# Patient Record
Sex: FEMALE | Race: OTHER | URBAN - METROPOLITAN AREA
[De-identification: names, ages, dates, MRNs, and addresses within clinical notes are randomized per-mention and may not be internally consistent; named-entity substitution may affect disease eponyms.]

---

## 2022-02-08 ENCOUNTER — INPATIENT (INPATIENT)
Facility: HOSPITAL | Age: 87
LOS: 20 days | Discharge: NURSING, REHAB OR LONG-TERM CARE | End: 2022-03-01
Attending: HOSPITALIST | Admitting: HOSPITALIST
Payer: MEDICARE

## 2022-02-08 VITALS
SYSTOLIC BLOOD PRESSURE: 109 MMHG | DIASTOLIC BLOOD PRESSURE: 55 MMHG | HEART RATE: 96 BPM | RESPIRATION RATE: 8 BRPM | OXYGEN SATURATION: 69 %

## 2022-02-08 PROCEDURE — 99285 EMERGENCY DEPT VISIT HI MDM: CPT

## 2022-02-08 PROCEDURE — 31500 INSERT EMERGENCY AIRWAY: CPT

## 2022-02-08 PROCEDURE — 99291 CRITICAL CARE FIRST HOUR: CPT | Mod: 25

## 2022-02-08 RX ORDER — CHLORHEXIDINE GLUCONATE 213 G/1000ML
15 SOLUTION TOPICAL EVERY 12 HOURS
Refills: 0 | Status: DISCONTINUED | OUTPATIENT
Start: 2022-02-08 | End: 2022-03-01

## 2022-02-08 RX ORDER — SODIUM CHLORIDE 9 MG/ML
2000 INJECTION, SOLUTION INTRAVENOUS ONCE
Refills: 0 | Status: COMPLETED | OUTPATIENT
Start: 2022-02-08 | End: 2022-02-08

## 2022-02-09 LAB
ALBUMIN SERPL ELPH-MCNC: 2.2 G/DL — LOW (ref 3.5–5.2)
ALBUMIN SERPL ELPH-MCNC: 2.3 G/DL — LOW (ref 3.5–5.2)
ALBUMIN SERPL ELPH-MCNC: 2.3 G/DL — LOW (ref 3.5–5.2)
ALBUMIN SERPL ELPH-MCNC: 2.6 G/DL — LOW (ref 3.5–5.2)
ALP SERPL-CCNC: 70 U/L — SIGNIFICANT CHANGE UP (ref 30–115)
ALP SERPL-CCNC: 71 U/L — SIGNIFICANT CHANGE UP (ref 30–115)
ALP SERPL-CCNC: 71 U/L — SIGNIFICANT CHANGE UP (ref 30–115)
ALP SERPL-CCNC: 85 U/L — SIGNIFICANT CHANGE UP (ref 30–115)
ALT FLD-CCNC: 57 U/L — HIGH (ref 0–41)
ALT FLD-CCNC: 57 U/L — HIGH (ref 0–41)
ALT FLD-CCNC: 60 U/L — HIGH (ref 0–41)
ALT FLD-CCNC: 77 U/L — HIGH (ref 0–41)
ANION GAP SERPL CALC-SCNC: 12 MMOL/L — SIGNIFICANT CHANGE UP (ref 7–14)
ANION GAP SERPL CALC-SCNC: 13 MMOL/L — SIGNIFICANT CHANGE UP (ref 7–14)
ANION GAP SERPL CALC-SCNC: 13 MMOL/L — SIGNIFICANT CHANGE UP (ref 7–14)
ANION GAP SERPL CALC-SCNC: 14 MMOL/L — SIGNIFICANT CHANGE UP (ref 7–14)
ANION GAP SERPL CALC-SCNC: >14 MMOL/L — SIGNIFICANT CHANGE UP (ref 7–14)
APPEARANCE UR: ABNORMAL
APTT BLD: 35 SEC — SIGNIFICANT CHANGE UP (ref 27–39.2)
AST SERPL-CCNC: 52 U/L — HIGH (ref 0–41)
AST SERPL-CCNC: 53 U/L — HIGH (ref 0–41)
AST SERPL-CCNC: 62 U/L — HIGH (ref 0–41)
AST SERPL-CCNC: 82 U/L — HIGH (ref 0–41)
BACTERIA # UR AUTO: NEGATIVE — SIGNIFICANT CHANGE UP
BASE EXCESS BLDV CALC-SCNC: -2.8 MMOL/L — LOW (ref -2–3)
BASOPHILS # BLD AUTO: 0.01 K/UL — SIGNIFICANT CHANGE UP (ref 0–0.2)
BASOPHILS # BLD AUTO: 0.02 K/UL — SIGNIFICANT CHANGE UP (ref 0–0.2)
BASOPHILS NFR BLD AUTO: 0.1 % — SIGNIFICANT CHANGE UP (ref 0–1)
BASOPHILS NFR BLD AUTO: 0.2 % — SIGNIFICANT CHANGE UP (ref 0–1)
BILIRUB SERPL-MCNC: 0.5 MG/DL — SIGNIFICANT CHANGE UP (ref 0.2–1.2)
BILIRUB UR-MCNC: NEGATIVE — SIGNIFICANT CHANGE UP
BUN SERPL-MCNC: 103 MG/DL — CRITICAL HIGH (ref 10–20)
BUN SERPL-MCNC: 107 MG/DL — CRITICAL HIGH (ref 10–20)
BUN SERPL-MCNC: 109 MG/DL — CRITICAL HIGH (ref 10–20)
BUN SERPL-MCNC: 110 MG/DL — CRITICAL HIGH (ref 10–20)
BUN SERPL-MCNC: 116 MG/DL — CRITICAL HIGH (ref 10–20)
CA-I SERPL-SCNC: 1.31 MMOL/L — SIGNIFICANT CHANGE UP (ref 1.15–1.33)
CALCIUM SERPL-MCNC: 8.1 MG/DL — LOW (ref 8.5–10.1)
CALCIUM SERPL-MCNC: 8.4 MG/DL — LOW (ref 8.5–10.1)
CALCIUM SERPL-MCNC: 8.7 MG/DL — SIGNIFICANT CHANGE UP (ref 8.5–10.1)
CALCIUM SERPL-MCNC: 8.8 MG/DL — SIGNIFICANT CHANGE UP (ref 8.5–10.1)
CALCIUM SERPL-MCNC: 9.8 MG/DL — SIGNIFICANT CHANGE UP (ref 8.5–10.1)
CHLORIDE SERPL-SCNC: 139 MMOL/L — HIGH (ref 98–110)
CHLORIDE SERPL-SCNC: 142 MMOL/L — HIGH (ref 98–110)
CHLORIDE SERPL-SCNC: 144 MMOL/L — HIGH (ref 98–110)
CHLORIDE SERPL-SCNC: 145 MMOL/L — HIGH (ref 98–110)
CHLORIDE SERPL-SCNC: 145 MMOL/L — HIGH (ref 98–110)
CO2 SERPL-SCNC: 21 MMOL/L — SIGNIFICANT CHANGE UP (ref 17–32)
CO2 SERPL-SCNC: 21 MMOL/L — SIGNIFICANT CHANGE UP (ref 17–32)
CO2 SERPL-SCNC: 22 MMOL/L — SIGNIFICANT CHANGE UP (ref 17–32)
COLOR SPEC: YELLOW — SIGNIFICANT CHANGE UP
CREAT ?TM UR-MCNC: 116 MG/DL — SIGNIFICANT CHANGE UP
CREAT SERPL-MCNC: 2.7 MG/DL — HIGH (ref 0.7–1.5)
CREAT SERPL-MCNC: 2.8 MG/DL — HIGH (ref 0.7–1.5)
CREAT SERPL-MCNC: 2.9 MG/DL — HIGH (ref 0.7–1.5)
CREAT SERPL-MCNC: 2.9 MG/DL — HIGH (ref 0.7–1.5)
CREAT SERPL-MCNC: 3.3 MG/DL — HIGH (ref 0.7–1.5)
DIFF PNL FLD: ABNORMAL
EOSINOPHIL # BLD AUTO: 0 K/UL — SIGNIFICANT CHANGE UP (ref 0–0.7)
EOSINOPHIL # BLD AUTO: 0.01 K/UL — SIGNIFICANT CHANGE UP (ref 0–0.7)
EOSINOPHIL NFR BLD AUTO: 0 % — SIGNIFICANT CHANGE UP (ref 0–8)
EOSINOPHIL NFR BLD AUTO: 0.1 % — SIGNIFICANT CHANGE UP (ref 0–8)
EPI CELLS # UR: 2 /HPF — SIGNIFICANT CHANGE UP (ref 0–5)
GAS PNL BLDA: SIGNIFICANT CHANGE UP
GAS PNL BLDV: 175 MMOL/L — CRITICAL HIGH (ref 136–145)
GAS PNL BLDV: SIGNIFICANT CHANGE UP
GLUCOSE BLDC GLUCOMTR-MCNC: 92 MG/DL — SIGNIFICANT CHANGE UP (ref 70–99)
GLUCOSE SERPL-MCNC: 118 MG/DL — HIGH (ref 70–99)
GLUCOSE SERPL-MCNC: 123 MG/DL — HIGH (ref 70–99)
GLUCOSE SERPL-MCNC: 133 MG/DL — HIGH (ref 70–99)
GLUCOSE SERPL-MCNC: 205 MG/DL — HIGH (ref 70–99)
GLUCOSE SERPL-MCNC: 310 MG/DL — HIGH (ref 70–99)
GLUCOSE UR QL: NEGATIVE — SIGNIFICANT CHANGE UP
HCO3 BLDV-SCNC: 25 MMOL/L — SIGNIFICANT CHANGE UP (ref 22–29)
HCT VFR BLD CALC: 33.7 % — LOW (ref 37–47)
HCT VFR BLD CALC: 35.6 % — LOW (ref 37–47)
HCT VFR BLD CALC: 38.2 % — SIGNIFICANT CHANGE UP (ref 37–47)
HCT VFR BLDA CALC: 38 % — SIGNIFICANT CHANGE UP (ref 34.5–46.5)
HGB BLD CALC-MCNC: 12.7 G/DL — SIGNIFICANT CHANGE UP (ref 11.7–16.1)
HGB BLD-MCNC: 10.3 G/DL — LOW (ref 12–16)
HGB BLD-MCNC: 10.5 G/DL — LOW (ref 12–16)
HGB BLD-MCNC: 11.5 G/DL — LOW (ref 12–16)
HYALINE CASTS # UR AUTO: 13 /LPF — HIGH (ref 0–7)
IMM GRANULOCYTES NFR BLD AUTO: 0.8 % — HIGH (ref 0.1–0.3)
IMM GRANULOCYTES NFR BLD AUTO: 0.9 % — HIGH (ref 0.1–0.3)
INR BLD: 1.43 RATIO — HIGH (ref 0.65–1.3)
KETONES UR-MCNC: SIGNIFICANT CHANGE UP
LACTATE BLDV-MCNC: 3.1 MMOL/L — HIGH (ref 0.5–2)
LACTATE SERPL-SCNC: 2.6 MMOL/L — HIGH (ref 0.7–2)
LACTATE SERPL-SCNC: 2.7 MMOL/L — HIGH (ref 0.7–2)
LACTATE SERPL-SCNC: 4 MMOL/L — CRITICAL HIGH (ref 0.7–2)
LEUKOCYTE ESTERASE UR-ACNC: NEGATIVE — SIGNIFICANT CHANGE UP
LYMPHOCYTES # BLD AUTO: 0.82 K/UL — LOW (ref 1.2–3.4)
LYMPHOCYTES # BLD AUTO: 1.12 K/UL — LOW (ref 1.2–3.4)
LYMPHOCYTES # BLD AUTO: 12.5 % — LOW (ref 20.5–51.1)
LYMPHOCYTES # BLD AUTO: 7.5 % — LOW (ref 20.5–51.1)
MAGNESIUM SERPL-MCNC: 2.5 MG/DL — HIGH (ref 1.8–2.4)
MAGNESIUM SERPL-MCNC: 2.6 MG/DL — HIGH (ref 1.8–2.4)
MAGNESIUM SERPL-MCNC: 2.9 MG/DL — HIGH (ref 1.8–2.4)
MCHC RBC-ENTMCNC: 29.5 G/DL — LOW (ref 32–37)
MCHC RBC-ENTMCNC: 29.7 PG — SIGNIFICANT CHANGE UP (ref 27–31)
MCHC RBC-ENTMCNC: 29.9 PG — SIGNIFICANT CHANGE UP (ref 27–31)
MCHC RBC-ENTMCNC: 30.1 G/DL — LOW (ref 32–37)
MCHC RBC-ENTMCNC: 30.4 PG — SIGNIFICANT CHANGE UP (ref 27–31)
MCHC RBC-ENTMCNC: 30.6 G/DL — LOW (ref 32–37)
MCV RBC AUTO: 100.6 FL — HIGH (ref 81–99)
MCV RBC AUTO: 99.2 FL — HIGH (ref 81–99)
MCV RBC AUTO: 99.4 FL — HIGH (ref 81–99)
MONOCYTES # BLD AUTO: 0.29 K/UL — SIGNIFICANT CHANGE UP (ref 0.1–0.6)
MONOCYTES # BLD AUTO: 0.36 K/UL — SIGNIFICANT CHANGE UP (ref 0.1–0.6)
MONOCYTES NFR BLD AUTO: 2.6 % — SIGNIFICANT CHANGE UP (ref 1.7–9.3)
MONOCYTES NFR BLD AUTO: 4 % — SIGNIFICANT CHANGE UP (ref 1.7–9.3)
NEUTROPHILS # BLD AUTO: 7.36 K/UL — HIGH (ref 1.4–6.5)
NEUTROPHILS # BLD AUTO: 9.72 K/UL — HIGH (ref 1.4–6.5)
NEUTROPHILS NFR BLD AUTO: 82.5 % — HIGH (ref 42.2–75.2)
NEUTROPHILS NFR BLD AUTO: 88.8 % — HIGH (ref 42.2–75.2)
NITRITE UR-MCNC: NEGATIVE — SIGNIFICANT CHANGE UP
NRBC # BLD: 0 /100 WBCS — SIGNIFICANT CHANGE UP (ref 0–0)
NT-PROBNP SERPL-SCNC: 1100 PG/ML — HIGH (ref 0–300)
OSMOLALITY UR: 598 MOS/KG — SIGNIFICANT CHANGE UP (ref 50–1200)
PCO2 BLDV: 54 MMHG — HIGH (ref 39–42)
PH BLDV: 7.27 — LOW (ref 7.32–7.43)
PH UR: 5.5 — SIGNIFICANT CHANGE UP (ref 5–8)
PLATELET # BLD AUTO: 120 K/UL — LOW (ref 130–400)
PLATELET # BLD AUTO: 74 K/UL — LOW (ref 130–400)
PLATELET # BLD AUTO: 77 K/UL — LOW (ref 130–400)
PO2 BLDV: 63 MMHG — SIGNIFICANT CHANGE UP
POTASSIUM BLDV-SCNC: 4.2 MMOL/L — SIGNIFICANT CHANGE UP (ref 3.5–5.1)
POTASSIUM SERPL-MCNC: 3 MMOL/L — LOW (ref 3.5–5)
POTASSIUM SERPL-MCNC: 3.3 MMOL/L — LOW (ref 3.5–5)
POTASSIUM SERPL-MCNC: 3.5 MMOL/L — SIGNIFICANT CHANGE UP (ref 3.5–5)
POTASSIUM SERPL-MCNC: 3.6 MMOL/L — SIGNIFICANT CHANGE UP (ref 3.5–5)
POTASSIUM SERPL-MCNC: 4.1 MMOL/L — SIGNIFICANT CHANGE UP (ref 3.5–5)
POTASSIUM SERPL-SCNC: 3 MMOL/L — LOW (ref 3.5–5)
POTASSIUM SERPL-SCNC: 3.3 MMOL/L — LOW (ref 3.5–5)
POTASSIUM SERPL-SCNC: 3.5 MMOL/L — SIGNIFICANT CHANGE UP (ref 3.5–5)
POTASSIUM SERPL-SCNC: 3.6 MMOL/L — SIGNIFICANT CHANGE UP (ref 3.5–5)
POTASSIUM SERPL-SCNC: 4.1 MMOL/L — SIGNIFICANT CHANGE UP (ref 3.5–5)
PROT SERPL-MCNC: 5.1 G/DL — LOW (ref 6–8)
PROT SERPL-MCNC: 5.2 G/DL — LOW (ref 6–8)
PROT SERPL-MCNC: 5.4 G/DL — LOW (ref 6–8)
PROT SERPL-MCNC: 6.1 G/DL — SIGNIFICANT CHANGE UP (ref 6–8)
PROT UR-MCNC: ABNORMAL
PROTHROM AB SERPL-ACNC: 16.4 SEC — HIGH (ref 9.95–12.87)
RBC # BLD: 3.39 M/UL — LOW (ref 4.2–5.4)
RBC # BLD: 3.54 M/UL — LOW (ref 4.2–5.4)
RBC # BLD: 3.85 M/UL — LOW (ref 4.2–5.4)
RBC # FLD: 15.1 % — HIGH (ref 11.5–14.5)
RBC # FLD: 15.3 % — HIGH (ref 11.5–14.5)
RBC # FLD: 15.3 % — HIGH (ref 11.5–14.5)
RBC CASTS # UR COMP ASSIST: 5 /HPF — HIGH (ref 0–4)
SAO2 % BLDV: 89.3 % — SIGNIFICANT CHANGE UP
SARS-COV-2 RNA SPEC QL NAA+PROBE: SIGNIFICANT CHANGE UP
SODIUM SERPL-SCNC: 173 MMOL/L — CRITICAL HIGH (ref 135–146)
SODIUM SERPL-SCNC: 176 MMOL/L — CRITICAL HIGH (ref 135–146)
SODIUM SERPL-SCNC: 180 MMOL/L — CRITICAL HIGH (ref 135–146)
SODIUM SERPL-SCNC: 180 MMOL/L — CRITICAL HIGH (ref 135–146)
SODIUM SERPL-SCNC: >180 MMOL/L — CRITICAL HIGH (ref 135–146)
SODIUM UR-SCNC: 45 MMOL/L — SIGNIFICANT CHANGE UP
SP GR SPEC: 1.03 — SIGNIFICANT CHANGE UP (ref 1.01–1.03)
TROPONIN T SERPL-MCNC: 0.1 NG/ML — CRITICAL HIGH
TROPONIN T SERPL-MCNC: 0.12 NG/ML — CRITICAL HIGH
UROBILINOGEN FLD QL: SIGNIFICANT CHANGE UP
WBC # BLD: 10.95 K/UL — HIGH (ref 4.8–10.8)
WBC # BLD: 12.43 K/UL — HIGH (ref 4.8–10.8)
WBC # BLD: 8.93 K/UL — SIGNIFICANT CHANGE UP (ref 4.8–10.8)
WBC # FLD AUTO: 10.95 K/UL — HIGH (ref 4.8–10.8)
WBC # FLD AUTO: 12.43 K/UL — HIGH (ref 4.8–10.8)
WBC # FLD AUTO: 8.93 K/UL — SIGNIFICANT CHANGE UP (ref 4.8–10.8)
WBC UR QL: 3 /HPF — SIGNIFICANT CHANGE UP (ref 0–5)

## 2022-02-09 PROCEDURE — 93010 ELECTROCARDIOGRAM REPORT: CPT

## 2022-02-09 PROCEDURE — 71045 X-RAY EXAM CHEST 1 VIEW: CPT | Mod: 26

## 2022-02-09 PROCEDURE — 88346 IMFLUOR 1ST 1ANTB STAIN PX: CPT | Mod: 26

## 2022-02-09 PROCEDURE — 88305 TISSUE EXAM BY PATHOLOGIST: CPT | Mod: 26

## 2022-02-09 PROCEDURE — 11106 INCAL BX SKN SINGLE LES: CPT

## 2022-02-09 PROCEDURE — 99223 1ST HOSP IP/OBS HIGH 75: CPT

## 2022-02-09 PROCEDURE — 88350 IMFLUOR EA ADDL 1ANTB STN PX: CPT | Mod: 26

## 2022-02-09 PROCEDURE — 99221 1ST HOSP IP/OBS SF/LOW 40: CPT | Mod: 25

## 2022-02-09 RX ORDER — SODIUM CHLORIDE 9 MG/ML
1000 INJECTION, SOLUTION INTRAVENOUS
Refills: 0 | Status: DISCONTINUED | OUTPATIENT
Start: 2022-02-09 | End: 2022-02-09

## 2022-02-09 RX ORDER — AMPICILLIN SODIUM AND SULBACTAM SODIUM 250; 125 MG/ML; MG/ML
1.5 INJECTION, POWDER, FOR SUSPENSION INTRAMUSCULAR; INTRAVENOUS EVERY 12 HOURS
Refills: 0 | Status: DISCONTINUED | OUTPATIENT
Start: 2022-02-10 | End: 2022-02-15

## 2022-02-09 RX ORDER — SALICYLIC ACID 0.5 %
1 CLEANSER (GRAM) TOPICAL
Refills: 0 | Status: DISCONTINUED | OUTPATIENT
Start: 2022-02-09 | End: 2022-02-09

## 2022-02-09 RX ORDER — AMPICILLIN SODIUM AND SULBACTAM SODIUM 250; 125 MG/ML; MG/ML
1.5 INJECTION, POWDER, FOR SUSPENSION INTRAMUSCULAR; INTRAVENOUS ONCE
Refills: 0 | Status: COMPLETED | OUTPATIENT
Start: 2022-02-09 | End: 2022-02-09

## 2022-02-09 RX ORDER — POTASSIUM CHLORIDE 20 MEQ
20 PACKET (EA) ORAL
Refills: 0 | Status: DISCONTINUED | OUTPATIENT
Start: 2022-02-09 | End: 2022-02-09

## 2022-02-09 RX ORDER — SODIUM CHLORIDE 9 MG/ML
1000 INJECTION, SOLUTION INTRAVENOUS ONCE
Refills: 0 | Status: COMPLETED | OUTPATIENT
Start: 2022-02-09 | End: 2022-02-09

## 2022-02-09 RX ORDER — FENTANYL CITRATE 50 UG/ML
0.5 INJECTION INTRAVENOUS
Qty: 2500 | Refills: 0 | Status: DISCONTINUED | OUTPATIENT
Start: 2022-02-09 | End: 2022-02-16

## 2022-02-09 RX ORDER — VANCOMYCIN HCL 1 G
1000 VIAL (EA) INTRAVENOUS ONCE
Refills: 0 | Status: COMPLETED | OUTPATIENT
Start: 2022-02-09 | End: 2022-02-09

## 2022-02-09 RX ORDER — NOREPINEPHRINE BITARTRATE/D5W 8 MG/250ML
0.05 PLASTIC BAG, INJECTION (ML) INTRAVENOUS
Qty: 8 | Refills: 0 | Status: DISCONTINUED | OUTPATIENT
Start: 2022-02-09 | End: 2022-02-21

## 2022-02-09 RX ORDER — SALICYLIC ACID 0.5 %
1 CLEANSER (GRAM) TOPICAL DAILY
Refills: 0 | Status: DISCONTINUED | OUTPATIENT
Start: 2022-02-09 | End: 2022-03-01

## 2022-02-09 RX ORDER — AMPICILLIN SODIUM AND SULBACTAM SODIUM 250; 125 MG/ML; MG/ML
INJECTION, POWDER, FOR SUSPENSION INTRAMUSCULAR; INTRAVENOUS
Refills: 0 | Status: DISCONTINUED | OUTPATIENT
Start: 2022-02-09 | End: 2022-02-15

## 2022-02-09 RX ORDER — SODIUM CHLORIDE 9 MG/ML
1000 INJECTION, SOLUTION INTRAVENOUS
Refills: 0 | Status: DISCONTINUED | OUTPATIENT
Start: 2022-02-09 | End: 2022-02-10

## 2022-02-09 RX ORDER — HEPARIN SODIUM 5000 [USP'U]/ML
5000 INJECTION INTRAVENOUS; SUBCUTANEOUS EVERY 12 HOURS
Refills: 0 | Status: DISCONTINUED | OUTPATIENT
Start: 2022-02-09 | End: 2022-02-20

## 2022-02-09 RX ORDER — INFLUENZA VIRUS VACCINE 15; 15; 15; 15 UG/.5ML; UG/.5ML; UG/.5ML; UG/.5ML
0.7 SUSPENSION INTRAMUSCULAR ONCE
Refills: 0 | Status: DISCONTINUED | OUTPATIENT
Start: 2022-02-09 | End: 2022-03-01

## 2022-02-09 RX ORDER — CEFEPIME 1 G/1
1000 INJECTION, POWDER, FOR SOLUTION INTRAMUSCULAR; INTRAVENOUS ONCE
Refills: 0 | Status: COMPLETED | OUTPATIENT
Start: 2022-02-09 | End: 2022-02-09

## 2022-02-09 RX ORDER — CHLORHEXIDINE GLUCONATE 213 G/1000ML
1 SOLUTION TOPICAL
Refills: 0 | Status: DISCONTINUED | OUTPATIENT
Start: 2022-02-09 | End: 2022-03-01

## 2022-02-09 RX ADMIN — FENTANYL CITRATE 2.25 MICROGRAM(S)/KG/HR: 50 INJECTION INTRAVENOUS at 08:53

## 2022-02-09 RX ADMIN — SODIUM CHLORIDE 2000 MILLILITER(S): 9 INJECTION, SOLUTION INTRAVENOUS at 00:00

## 2022-02-09 RX ADMIN — HEPARIN SODIUM 5000 UNIT(S): 5000 INJECTION INTRAVENOUS; SUBCUTANEOUS at 18:58

## 2022-02-09 RX ADMIN — FENTANYL CITRATE 2.25 MICROGRAM(S)/KG/HR: 50 INJECTION INTRAVENOUS at 23:44

## 2022-02-09 RX ADMIN — Medication 1 APPLICATION(S): at 18:56

## 2022-02-09 RX ADMIN — AMPICILLIN SODIUM AND SULBACTAM SODIUM 100 GRAM(S): 250; 125 INJECTION, POWDER, FOR SUSPENSION INTRAMUSCULAR; INTRAVENOUS at 16:57

## 2022-02-09 RX ADMIN — CHLORHEXIDINE GLUCONATE 15 MILLILITER(S): 213 SOLUTION TOPICAL at 18:56

## 2022-02-09 RX ADMIN — SODIUM CHLORIDE 1000 MILLILITER(S): 9 INJECTION, SOLUTION INTRAVENOUS at 14:30

## 2022-02-09 RX ADMIN — CHLORHEXIDINE GLUCONATE 15 MILLILITER(S): 213 SOLUTION TOPICAL at 06:07

## 2022-02-09 RX ADMIN — Medication 250 MILLIGRAM(S): at 02:29

## 2022-02-09 RX ADMIN — HEPARIN SODIUM 5000 UNIT(S): 5000 INJECTION INTRAVENOUS; SUBCUTANEOUS at 06:15

## 2022-02-09 RX ADMIN — Medication 50 MILLIEQUIVALENT(S): at 06:31

## 2022-02-09 RX ADMIN — SODIUM CHLORIDE 75 MILLILITER(S): 9 INJECTION, SOLUTION INTRAVENOUS at 16:58

## 2022-02-09 RX ADMIN — Medication 50 MILLIEQUIVALENT(S): at 08:51

## 2022-02-09 RX ADMIN — CHLORHEXIDINE GLUCONATE 1 APPLICATION(S): 213 SOLUTION TOPICAL at 06:06

## 2022-02-09 RX ADMIN — CEFEPIME 100 MILLIGRAM(S): 1 INJECTION, POWDER, FOR SOLUTION INTRAMUSCULAR; INTRAVENOUS at 02:29

## 2022-02-09 RX ADMIN — Medication 4.22 MICROGRAM(S)/KG/MIN: at 08:53

## 2022-02-09 NOTE — CONSULT NOTE ADULT - ASSESSMENT
A/P:     1. respiratory failure 2/2 aspiration PNA   - c/w mechanical ventilation   - serial lactate   - replete electrolytes  - surgical burn eval   - derm eval   - all imaging reviewed  - c/w pressors keep MAP above 65   - monitor I&O's  - send tsh   - dvt ppx  - palliative care fu in am     over all poor prognosis     CCT 35 min

## 2022-02-09 NOTE — CONSULT NOTE ADULT - ASSESSMENT
87 y/o F w/ PMH/o HTN (as per son at bedside) presenting to the hospital BIBA after experiencing respiratory distress. According to the patients family, the patient was being fed when she began choking and had difficulty breathing. Pt was bag masked by EMS and transported to the hospital. According to the family, the patient began to experience diffuse bullae across her entire body as well as ulcerations in her mouth for the past month. The family denied noticing fever or chills.  In the ED, patient was tachypneic and hypoxic,  intubated for airway protection. Pt was found to hypothermic, started on warming blanket. Pt started on IV levophed. S/p IV vanc/ cefepime. 87 y/o F w/ PMH/o HTN (as per son at bedside) presenting to the hospital BIBA after experiencing respiratory distress. According to the patients family, the patient was being fed when she began choking and had difficulty breathing. Pt was bag masked by EMS and transported to the hospital. According to the family, the patient began to experience diffuse bullae across her entire body as well as ulcerations in her mouth for the past month. The family denied noticing fever or chills.  In the ED, patient was tachypneic and hypoxic,  intubated for airway protection. Pt was found to hypothermic, started on warming blanket. Pt started on IV levophed. S/p IV vanc/ cefepime.    IMPRESSION;  Diffuse bullae : resembles Bullous pemphigoid  Not SJS/ EM  Not dermatitis herpetiformis  Could well be drug related ( over the counter of herbal medicine )  MSOF  JILL > minimal urine output  Aspiration with chemical pneumonitis   No bacterial PNA on CXR    RECOMMENDATIONS;  BCx   Burn > diagnostic skin Bx  Derm evaluation  Deep ET cultures  Unasyn 1.5 gm iv q12h

## 2022-02-09 NOTE — ED PROVIDER NOTE - PHYSICAL EXAMINATION
VITAL SIGNS: I have reviewed nursing notes and confirm.  CONSTITUTIONAL: Frail elderly female laying on stretcher; in severe distress.  SKIN: (+) Partial thickness wounds with devitalized skin scattered over entire body,  large open area on the back, pink base, with areas of dry and adherent devitalized tissue;  bilateral legs with dry adherent old dressings.  HEAD: Normocephalic; atraumatic.  EYES: Conjunctiva and sclera clear.  ENT: No nasal discharge; airway clear. (+) ulcer to hard palate  CARD: S1, S2 normal; no murmurs, gallops, or rubs. Regular rate and rhythm.  RESP: (+) tachypnea, accessory muscle use  ABD: Normal bowel sounds; soft; non-distended; non-tender; No rebound or guarding.   EXT: No clubbing, cyanosis or edema.  NEURO: No focal deficits.

## 2022-02-09 NOTE — ED PROVIDER NOTE - OBJECTIVE STATEMENT
87 yo F with no significant PMHx presents to the ED BIBEMS for evaluation of respiratory distress. According to family pt was eating, suddenly started gagging and went into respiratory distress. Family also endorses that pt developed a bullous rash about a month ago and she was going to holistic doctor who gave them "orange cream." Pt unable to provide hx 2/2 respiratory distress.

## 2022-02-09 NOTE — H&P ADULT - ATTENDING COMMENTS
events noted, acute hypoxemic resp failure/ aspiration, severe dehydration/ JILL/ diffuse skin lesion, cachectic, ? sjs  IVF++, taper pressors, burn eval, pull ETT, pancx, serial CMP, Palliative care eval

## 2022-02-09 NOTE — CONSULT NOTE ADULT - SKIN COMMENTS
generalised ruptured bullae with no surrounding edema/erythema/ Lesions with excoriations. Oral mucosa with ulcers. No li edema. no eye involvement

## 2022-02-09 NOTE — ED ADULT NURSE REASSESSMENT NOTE - NS ED NURSE REASSESS COMMENT FT1
pt received from previous shift  pt on cardiac monitoring, intubated  vs wnl  see mar  son at bedside  will continue care

## 2022-02-09 NOTE — ED PROVIDER NOTE - PROGRESS NOTE DETAILS
Discussed case with Burn team--state they will evaluate pt as routine consult, state they do not have Burn ICU currently. Discussed case with ICU attending Dr. Alexandre, pt approved for ICU.

## 2022-02-09 NOTE — CONSULT NOTE ADULT - ASSESSMENT
Pt is 87 y/o Female w/ PMH/o HTN presented to ED with respiratory failure, intubated, started on pressors, hypothermic, warming blanket placed, noted with partial thickness wounds scattered all over entire body,   BURN consult requested for skin/wounds evaluation.     # Diffuse partial thickness wounds with devitalized skin scattered all over entire body: ?bullous pemphigoid vs SJS   - continue wound care daily: wash all wounds with soap and water, apply A&D ointment, and cover with xeroform daily;   - pain management  - will need skin biopsy as per primary team request  - dermatology consult

## 2022-02-09 NOTE — H&P ADULT - NSHPLABSRESULTS_GEN_ALL_CORE
11.5   8.93  )-----------( 120      ( 2022 23:55 )             38.2       02    173<HH>  |  139<H>  |  103<HH>  ----------------------------<  310<H>  3.0<L>   |  22  |  2.7<H>    Ca    8.8      2022 03:30  Mg     2.9         TPro  5.1<L>  /  Alb  2.2<L>  /  TBili  0.5  /  DBili  x   /  AST  62<H>  /  ALT  60<H>  /  AlkPhos  71            ABG - ( 2022 03:40 )  pH, Arterial: 7.31  pH, Blood: x     /  pCO2: 46    /  pO2: 379   / HCO3: 23    / Base Excess: -3.1  /  SaO2: 100.0               Urinalysis Basic - ( 2022 00:00 )    Color: Yellow / Appearance: Slightly Turbid / S.026 / pH: x  Gluc: x / Ketone: Trace  / Bili: Negative / Urobili: <2 mg/dL   Blood: x / Protein: 30 mg/dL / Nitrite: Negative   Leuk Esterase: Negative / RBC: 5 /HPF / WBC 3 /HPF   Sq Epi: x / Non Sq Epi: 2 /HPF / Bacteria: Negative        PT/INR - ( 2022 23:55 )   PT: 16.40 sec;   INR: 1.43 ratio         PTT - ( 2022 23:55 )  PTT:35.0 sec    Lactate Trend      CARDIAC MARKERS ( 2022 23:55 )  x     / 0.10 ng/mL / x     / x     / x            CAPILLARY BLOOD GLUCOSE

## 2022-02-09 NOTE — CONSULT NOTE ADULT - SUBJECTIVE AND OBJECTIVE BOX
MAFARAZBENNIE  88y, Female  Allergy: No Known Allergies      All historical available data reviewed.    HPI:  87 y/o F w/ PMH/o HTN (as per son at bedside) presenting to the hospital [facility] after experiencing respiratory distress. According to the patients family, the patient was being fed when she began choking and had difficulty breathing. Pt was bag masked by EMS and transported to the hospital. According to the family, the patient began to experience diffuse bullae across her entire body as well as ulcerations in her mouth for the past month. The family denied noticing fever or chills.    In the ED, patient was tachypneic and hypoxic,  intubated for airway protection. Pt was found to hypothermic, started on warming blanket. Pt started on IV levophed. S/p IV vanc/ cefepime.         (2022 04:51)    FAMILY HISTORY:    PAST MEDICAL & SURGICAL HISTORY:        VITALS:  T(F): 94.6, Max: 94.6 (22 @ 04:00)  HR: 62  BP: 79/41  RR: 18Vital Signs Last 24 Hrs  T(C): 34.8 (2022 04:00), Max: 34.8 (2022 04:00)  T(F): 94.6 (2022 04:00), Max: 94.6 (2022 04:00)  HR: 62 (2022 04:00) (59 - 96)  BP: 79/41 (2022 04:00) (79/41 - 209/89)  BP(mean): 55 (2022 04:00) (55 - 128)  RR: 18 (2022 04:00) (8 - 18)  SpO2: 100% (2022 04:00) (69% - 100%)    TESTS & MEASUREMENTS:                        11.5   8.93  )-----------( 120      ( 2022 23:55 )             38.2         173<HH>  |  139<H>  |  103<HH>  ----------------------------<  310<H>  3.0<L>   |  22  |  2.7<H>    Ca    8.8      2022 03:30  Mg     2.9     02-08    TPro  5.1<L>  /  Alb  2.2<L>  /  TBili  0.5  /  DBili  x   /  AST  62<H>  /  ALT  60<H>  /  AlkPhos  71  02-09    LIVER FUNCTIONS - ( 2022 03:30 )  Alb: 2.2 g/dL / Pro: 5.1 g/dL / ALK PHOS: 71 U/L / ALT: 60 U/L / AST: 62 U/L / GGT: x             Urinalysis Basic - ( 2022 00:00 )    Color: Yellow / Appearance: Slightly Turbid / S.026 / pH: x  Gluc: x / Ketone: Trace  / Bili: Negative / Urobili: <2 mg/dL   Blood: x / Protein: 30 mg/dL / Nitrite: Negative   Leuk Esterase: Negative / RBC: 5 /HPF / WBC 3 /HPF   Sq Epi: x / Non Sq Epi: 2 /HPF / Bacteria: Negative          RADIOLOGY & ADDITIONAL TESTS:  Personal review of radiological diagnostics performed  Echo and EKG results noted when applicable.     MEDICATIONS:  chlorhexidine 0.12% Liquid 15 milliLiter(s) Oral Mucosa every 12 hours  chlorhexidine 4% Liquid 1 Application(s) Topical <User Schedule>  dextrose 5%. 1000 milliLiter(s) IV Continuous <Continuous>  fentaNYL   Infusion. 0.5 MICROgram(s)/kG/Hr IV Continuous <Continuous>  heparin   Injectable 5000 Unit(s) SubCutaneous every 12 hours  potassium chloride  20 mEq/100 mL IVPB 20 milliEquivalent(s) IV Intermittent every 2 hours      ANTIBIOTICS:

## 2022-02-09 NOTE — CONSULT NOTE ADULT - SUBJECTIVE AND OBJECTIVE BOX
Pt is 87 y/o Female w/ PMH/o HTN presented to ED after experiencing respiratory distress.  According to the patients family, the patient was being fed when she began choking and had difficulty breathing. Pt noted to be tachypneic, and hypoxic, intubated in ED for airway protection; started on levophed drip for hypotension; noted to be hypothermic, started on warming blanket. According to the family, the patient began to experience diffuse skin reaction across her entire body as well as ulcerations in her mouth for the past month. BURN consult called for skin/wounds evaluation.     Allergies  No Known Allergies    Vital Signs Last 24 Hrs  T(C): 36.6 (09 Feb 2022 09:30), Max: 37 (09 Feb 2022 08:30)  T(F): 97.9 (09 Feb 2022 09:30), Max: 98.6 (09 Feb 2022 08:30)  HR: 75 (09 Feb 2022 09:30) (59 - 96)  BP: 95/56 (09 Feb 2022 09:30) (79/41 - 209/89)  BP(mean): 72 (09 Feb 2022 07:00) (55 - 128)  RR: 18 (09 Feb 2022 09:30) (8 - 18)  SpO2: 100% (09 Feb 2022 09:30) (69% - 100%)      PHYSICAL EXAM  General: seen on bedside in ED, intubated, on vent support, responsive to pain only.   Skin/Wound: partial thickness wounds with devitalized skin scattered all over entire body,  large open area on the back, pink base, with areas of dry and adherent devitalized tissue;  bilateral legs with adherent old dressing; dressing soaked with soap and water, then gently removed on bedside; some wilbur debrided deep to subcutaneous tissue.    Large dressing with xeroform, and adaptic applied to open wounds. Pt tolerated well.       LABS:                          10.3   12.43 )-----------( x        ( 09 Feb 2022 11:54 )             33.7                                           Mode: AC/ CMV (Assist Control/ Continuous Mandatory Ventilation)  RR (machine): 16  TV (machine): 300  FiO2: 50  ITime: 1  MAP: 11  PIP: 32                                      MEDICATIONS  (STANDING):  chlorhexidine 0.12% Liquid 15 milliLiter(s) Oral Mucosa every 12 hours  chlorhexidine 4% Liquid 1 Application(s) Topical <User Schedule>  fentaNYL   Infusion. 0.5 MICROgram(s)/kG/Hr (2.25 mL/Hr) IV Continuous <Continuous>  heparin   Injectable 5000 Unit(s) SubCutaneous every 12 hours  norepinephrine Infusion 0.05 MICROgram(s)/kG/Min (4.22 mL/Hr) IV Continuous <Continuous>  potassium chloride  20 mEq/100 mL IVPB 20 milliEquivalent(s) IV Intermittent every 2 hours      Ass/ Rec:  Venous stasis ulcer  LE   Infected  Wound care -   IV abx as indicated  Surgical debridement   Elevation and compression   Will follow. Pt is 89 y/o Female w/ PMH/o HTN presented to ED after experiencing respiratory distress.  According to the patients family, the patient was being fed when she began choking and had difficulty breathing. Pt noted to be tachypneic, and hypoxic, intubated in ED for airway protection; started on levophed drip for hypotension; noted to be hypothermic, started on warming blanket. According to the family, the patient began to experience diffuse skin reaction across her entire body as well as ulcerations in her mouth for the past several months. BURN consult called for skin/wounds evaluation.     Allergies  No Known Allergies    Vital Signs Last 24 Hrs  T(C): 36.6 (09 Feb 2022 09:30), Max: 37 (09 Feb 2022 08:30)  T(F): 97.9 (09 Feb 2022 09:30), Max: 98.6 (09 Feb 2022 08:30)  HR: 75 (09 Feb 2022 09:30) (59 - 96)  BP: 95/56 (09 Feb 2022 09:30) (79/41 - 209/89)  BP(mean): 72 (09 Feb 2022 07:00) (55 - 128)  RR: 18 (09 Feb 2022 09:30) (8 - 18)  SpO2: 100% (09 Feb 2022 09:30) (69% - 100%)      PHYSICAL EXAM  General: seen on bedside in ED, intubated, on vent support, responsive to pain only.   Skin/Wound: partial thickness wounds with devitalized skin scattered all over entire body,  large open area on the back, pink base, with areas of dry and adherent devitalized tissue;  bilateral legs with dry adherent old dressing; dressing soaked with soap and water, then gently removed on bedside; some areas debrided to dermis       LABS:                          10.3   12.43 )-----------( x        ( 09 Feb 2022 11:54 )             33.7                                           Mode: AC/ CMV (Assist Control/ Continuous Mandatory Ventilation)  RR (machine): 16  TV (machine): 300  FiO2: 50  ITime: 1  MAP: 11  PIP: 32                                      MEDICATIONS  (STANDING):  chlorhexidine 0.12% Liquid 15 milliLiter(s) Oral Mucosa every 12 hours  chlorhexidine 4% Liquid 1 Application(s) Topical <User Schedule>  fentaNYL   Infusion. 0.5 MICROgram(s)/kG/Hr (2.25 mL/Hr) IV Continuous <Continuous>  heparin   Injectable 5000 Unit(s) SubCutaneous every 12 hours  norepinephrine Infusion 0.05 MICROgram(s)/kG/Min (4.22 mL/Hr) IV Continuous <Continuous>  potassium chloride  20 mEq/100 mL IVPB 20 milliEquivalent(s) IV Intermittent every 2 hours      Ass/ Rec:  Venous stasis ulcer  LE   Infected  Wound care -   IV abx as indicated  Surgical debridement   Elevation and compression   Will follow.

## 2022-02-09 NOTE — CONSULT NOTE ADULT - SUBJECTIVE AND OBJECTIVE BOX
NEPHROLOGY CONSULTATION NOTE    Patient is a 88y Female with PMH HTN presenting to the hospital [facility] after experiencing respiratory distress. According to the patients family, the patient was being fed when she began choking and had difficulty breathing. Pt was bag masked by EMS and transported to the hospital. According to the family, the patient began to experience diffuse skin reaction across her entire body as well as ulcerations in her mouth for the past month. The family denied noticing fever or chills.    In the ED, patient was tachypneic and hypoxic,  intubated for airway protection. Pt was found to hypothermic, started on warming blanket. Pt started on IV levophed. S/p IV vanc/ cefepime.     Nephrology consulted due to sodium >180 and evidence of JILL (baseline kidney function is unknown). Patient clinically appears dry.     PAST MEDICAL & SURGICAL HISTORY:    Allergies:  No Known Allergies    Home Medications Reviewed  Hospital Medications:   MEDICATIONS  (STANDING):  chlorhexidine 0.12% Liquid 15 milliLiter(s) Oral Mucosa every 12 hours  chlorhexidine 4% Liquid 1 Application(s) Topical <User Schedule>  fentaNYL   Infusion. 0.5 MICROgram(s)/kG/Hr (2.25 mL/Hr) IV Continuous <Continuous>  heparin   Injectable 5000 Unit(s) SubCutaneous every 12 hours  norepinephrine Infusion 0.05 MICROgram(s)/kG/Min (4.22 mL/Hr) IV Continuous <Continuous>  potassium chloride  20 mEq/100 mL IVPB 20 milliEquivalent(s) IV Intermittent every 2 hours      SOCIAL HISTORY:  Denies ETOH,Smoking,     VITALS:  T(F): 98.6 (22 @ 08:30), Max: 98.6 (22 @ 08:30)  HR: 79 (22 @ 08:30)  BP: 97/52 (22 @ 08:30)  RR: 18 (22 @ 08:30)  SpO2: 100% (22 @ 08:30)     @ 07:01  -   07:00  --------------------------------------------------------  IN: 125.6 mL / OUT: 280 mL / NET: -154.4 mL        Weight (kg): 45 ( @ 03:57)    22 @ 07:01  -  22 @ 07:00  --------------------------------------------------------  IN: 0 mL / OUT: 280 mL / NET: -280 mL      I&O's Detail    2022 07:  -  2022 07:00  --------------------------------------------------------  IN:    dextrose 5%: 120 mL    FentaNYL: 5.6 mL  Total IN: 125.6 mL    OUT:    Indwelling Catheter - Urethral (mL): 280 mL  Total OUT: 280 mL    Total NET: -154.4 mL    PHYSICAL EXAM:  Constitutional: Intubated, sedated   HEENT: anicteric sclera, oropharynx clear. Appears dry  Neck: No JVD  Respiratory: Bilateral air turbulence noted; intubated  Cardiovascular: S1, S2, RRR  Gastrointestinal: BS+, soft, NT/ND  Extremities: No cyanosis or clubbing. No peripheral edema  Neurological: N/A  Psychiatric: N/A  : Gallegos in place  Skin: Various bruising noted    LABS:      173<HH>  |  139<H>  |  103<HH>  ----------------------------<  310<H>  3.0<L>   |  22  |  2.7<H>    Ca    8.8      2022 03:30  Mg     2.9         TPro  5.1<L>  /  Alb  2.2<L>  /  TBili  0.5  /  DBili      /  AST  62<H>  /  ALT  60<H>  /  AlkPhos  71      Creatinine Trend: 2.7 <--, 3.3 <--                        11.5   8.93  )-----------( 120      ( 2022 23:55 )             38.2     Urine Studies:  Urinalysis Basic - ( 2022 00:00 )    Color: Yellow / Appearance: Slightly Turbid / S.026 / pH:   Gluc:  / Ketone: Trace  / Bili: Negative / Urobili: <2 mg/dL   Blood:  / Protein: 30 mg/dL / Nitrite: Negative   Leuk Esterase: Negative / RBC: 5 /HPF / WBC 3 /HPF   Sq Epi:  / Non Sq Epi: 2 /HPF / Bacteria: Negative

## 2022-02-09 NOTE — H&P ADULT - HISTORY OF PRESENT ILLNESS
87 y/o F w/ PMH/o HTN (as per son at bedside) presenting to the hospital BIBA after experiencing respiratory distress. According to the patients family, the patient was being fed when she began choking and had difficulty breathing. Pt was bag masked by EMS and transported to the hospital. According to the family, the patient began to experience diffuse bullae across her entire body as well as ulcerations in her mouth for the past month. The family denied noticing fever or chills.    In the ED, patient was tachypneic and hypoxic,  intubated for airway protection. Pt was found to hypothermic, started on warming blanket. Pt started on IV levophed. S/p IV vanc/ cefepime.         87 y/o F w/ PMH/o HTN (as per son at bedside) presenting to the hospital BIBA after experiencing respiratory distress. According to the patients family, the patient was being fed when she began choking and had difficulty breathing. Pt was bag masked by EMS and transported to the hospital. According to the family, the patient began to experience diffuse skin reaction across her entire body as well as ulcerations in her mouth for the past month. The family denied noticing fever or chills.    In the ED, patient was tachypneic and hypoxic,  intubated for airway protection. Pt was found to hypothermic, started on warming blanket. Pt started on IV levophed. S/p IV vanc/ cefepime. called to evaluate

## 2022-02-09 NOTE — CONSULT NOTE ADULT - ASSESSMENT
88y Female with PMH HTN presenting to the hospital [facility] after experiencing respiratory distress. Nephrology consulted for sodium >180 and evidence of JILL in setting of unknown baseline kidney function.     #) Hypernatremia / JILL  - Patient clinically appears very dry; is likely dehydrated   - Can send urine electrolytes + osmolality + serum osmolality   - Recommend fluid resuscitation - aim to reduce sodium concentration by 10 mEq/L every 24 hours  - Can use D5 water 1-1.5 mL/kg/hour  - Serial BMP at every lab draw 88y Female with PMH HTN presenting to the hospital [facility] after experiencing respiratory distress. Nephrology consulted for sodium >180 and evidence of JILL in setting of unknown baseline kidney function.     # Hypernatremia   # JILL / ATN d/t hypotension  - Patient clinically appears very dry; is likely dehydrated   - continue fluid resuscitation with hypotonic fluids  - Serial BMP

## 2022-02-09 NOTE — PATIENT PROFILE ADULT - PRO INTERPRETER NEED 2
Postoperative examination Postoperative examination Postoperative examination Postoperative examination Postoperative examination Postoperative examination Postoperative examination Postoperative examination Postoperative examination Postoperative examination Postoperative examination Postoperative examination Postoperative examination Postoperative examination Postoperative examination Postoperative examination Postoperative examination Postoperative examination Postoperative examination Postoperative examination Postoperative examination Postoperative examination Postoperative examination Postoperative examination Postoperative examination Mandarin

## 2022-02-09 NOTE — CONSULT NOTE ADULT - SUBJECTIVE AND OBJECTIVE BOX
Patient is a 88y old  Female who presents with a chief complaint of respiratory failure / possible aspiration PNA       REVIEW OF SYSTEMS: unable to obtain 2/2 clinical condition   Social Hx: non smoker  lives with family       PHYSICAL EXAM:  GENERAL: intubated   NECK: no jvd  NERVOUS SYSTEM:  sedated   HEART: Regular rate and rhythm; No murmurs, rubs, or gallops  ABDOMEN: Soft, Nontender, Nondistended; Bowel sounds present  EXTREMITIES:  + skin lesions excoriation   SKIN: extensive skin slothing     labs                              11.5   8.93  )-----------( 120      ( 08 Feb 2022 23:55 )             38.2

## 2022-02-09 NOTE — ED PROVIDER NOTE - CRITICAL CARE ATTENDING CONTRIBUTION TO CARE
88-year-old female with no past medical history who presents in respiratory distress.  As per patient's family today they were feeding patient and patient started gagging and having difficulty breathing.  EMS was called to the house, patient was bagged in the field no medications given.  Of note patient's family states that approximately a month ago patient started developing bullae throughout her upper and lower extremities.  Patient went to homeopathic doctor who again gave her a cream of unknown name for the bullae without any relief.  Family denies any fevers chills nausea vomiting or any other medical complaints.  Finding patient presented to the ED patient was noted to be tachypneic hypoxic and was intubated for airway protection.    VITAL SIGNS: I have reviewed nursing notes and confirm.  CONSTITUTIONAL: in respiratory distress   SKIN: diffuse bullae, and ulcers noted in the UE/LE  EYES: EOMI, pink conjunctiva, anicteric  ENT: tongue midline, no exudates, MMM  NECK: Supple; no meningismus, no JVD  CARD: RRR, no murmurs, equal radial pulses bilaterally 2+  RESP: tachypneic, respiratory distress  ABD: Soft, non-tender, non-distended, no peritoneal signs, no HSM, no CVA tenderness  EXT: Normal ROM x4.   NEURO: unable to assess due to respiratory distress     a/p  88 yr old f that presents in respiratory distress  -labs  -ekg  -imaging  -iv antibiotics  -iv fluids  -admit

## 2022-02-09 NOTE — ED PROVIDER NOTE - CARE PLAN
Principal Discharge DX:	Acute respiratory failure with hypoxia  Secondary Diagnosis:	Hypernatremia  Secondary Diagnosis:	JILL (acute kidney injury)  Secondary Diagnosis:	Body rash   1

## 2022-02-09 NOTE — ED PROVIDER NOTE - CLINICAL SUMMARY MEDICAL DECISION MAKING FREE TEXT BOX
88 yr old f that presents in respiratory distress  -labs  -ekg  -imaging  -iv antibiotics  -iv fluids  -admit

## 2022-02-09 NOTE — H&P ADULT - NSHPPHYSICALEXAM_GEN_ALL_CORE
PHYSICAL EXAM:  GENERAL: Intubated/sedated, Cachectic  HEAD:  Atraumatic, Normocephalic  NECK: Supple, No JVD  CHEST/LUNG: Clear to auscultation bilaterally; symmetrical chest rise b/l  HEART: Regular rate and rhythm; No murmurs;   ABDOMEN: Soft, Nontender, Nondistended  EXTREMITIES:  2+ Peripheral Pulses  PSYCH: sedated  NEUROLOGY: non-focal  SKIN: Diffuse bullae, both intact and ruptured across upper and lower body, skin tenting

## 2022-02-09 NOTE — PATIENT PROFILE ADULT - FALL HARM RISK - HARM RISK INTERVENTIONS

## 2022-02-09 NOTE — H&P ADULT - ASSESSMENT
89 y/o F w/ PMH/o HTN (as per son at bedside) presenting to the hospital [facility] after experiencing respiratory distress    IMPRESSION:  Acute Hypoxic Respiratory Failure 2/2 aspiration  Severe hypernatremia  HT1: Mild Hypothermia  Severely reduced kidney function (Possible prerenal JILL, baseline Cr unknown)  HTN? History limited    PLAN:    CNS: fentanyl for sedation, ween off as tolerated    HEENT: Oral care    PULMONARY: intubated. Titrate FIO2 down. HOB @ 45 degrees.  Aspiration precautions. F/u AM ABG/ CXR    CARDIOVASCULAR: IV levophed, titrate down as tolerated    GI: GI prophylaxis.  Bowel regimen     RENAL:  D5W. Follow up lytes.  Correct as needed. F/u urine lytes/osmol    INFECTIOUS DISEASE: S/p IV cefepime/ vanc. ID consult. Follow up cultures.    HEMATOLOGICAL:  DVT prophylaxis.    ENDOCRINE:  Follow up FS. SS. A1c. TSH.      #DVT PPx- Heparin SQ  #Diet- NG feeds  #CHG  #Activity- Bedrest  #Dispo- MICU  #Code- GOC reviewed w/ son,  prognosis explained in detail, pt to remain full code  Grim prognosis         87 y/o F w/ PMH/o HTN (as per son at bedside) presenting to the hospital [facility] after experiencing respiratory distress    IMPRESSION:  Acute Hypoxic Respiratory Failure 2/2 aspiration  Severe hypernatremia  HT1: Mild Hypothermia  Severely reduced kidney function (Possible prerenal JILL, baseline Cr unknown)  HTN? History limited  ? SJS    PLAN:    CNS: fentanyl for sedation, ween off as tolerated    HEENT: Oral care    PULMONARY: intubated. Titrate FIO2 down. HOB @ 45 degrees.  Aspiration precautions. F/u AM ABG/ CXR, pull ETT 2 CM    CARDIOVASCULAR: IV levophed, titrate down as tolerated, IVF++    GI: GI prophylaxis.  Bowel regimen     RENAL:  D5W. Follow up lytes.  Correct as needed. F/u urine lytes/osmol    INFECTIOUS DISEASE: S/p IV cefepime/ vanc. ID consult. Follow up cultures.    HEMATOLOGICAL:  DVT prophylaxis.    ENDOCRINE:  Follow up FS. SS. A1c. TSH.    Burm eval  #DVT PPx- Heparin SQ  #Diet- NG feeds  #CHG  #Activity- Bedrest  #Dispo- MICU  #Code- GOC reviewed w/ son,  prognosis explained in detail, pt to remain full code  Grim prognosis

## 2022-02-09 NOTE — CONSULT NOTE ADULT - COMMENTS
unable to obtain history secondary to patient's mental status and/or sedation  son at bedside  on pressors  Fio2 50%  no response

## 2022-02-10 LAB
A1C WITH ESTIMATED AVERAGE GLUCOSE RESULT: 6.9 % — HIGH (ref 4–5.6)
ABO RH CONFIRMATION: SIGNIFICANT CHANGE UP
ALBUMIN SERPL ELPH-MCNC: 1.7 G/DL — LOW (ref 3.5–5.2)
ALBUMIN SERPL ELPH-MCNC: 1.9 G/DL — LOW (ref 3.5–5.2)
ALBUMIN SERPL ELPH-MCNC: 2.2 G/DL — LOW (ref 3.5–5.2)
ALP SERPL-CCNC: 62 U/L — SIGNIFICANT CHANGE UP (ref 30–115)
ALP SERPL-CCNC: 69 U/L — SIGNIFICANT CHANGE UP (ref 30–115)
ALP SERPL-CCNC: 70 U/L — SIGNIFICANT CHANGE UP (ref 30–115)
ALT FLD-CCNC: 42 U/L — HIGH (ref 0–41)
ALT FLD-CCNC: 48 U/L — HIGH (ref 0–41)
ALT FLD-CCNC: 50 U/L — HIGH (ref 0–41)
ANION GAP SERPL CALC-SCNC: 10 MMOL/L — SIGNIFICANT CHANGE UP (ref 7–14)
ANION GAP SERPL CALC-SCNC: 10 MMOL/L — SIGNIFICANT CHANGE UP (ref 7–14)
ANION GAP SERPL CALC-SCNC: 11 MMOL/L — SIGNIFICANT CHANGE UP (ref 7–14)
AST SERPL-CCNC: 52 U/L — HIGH (ref 0–41)
AST SERPL-CCNC: 69 U/L — HIGH (ref 0–41)
AST SERPL-CCNC: 71 U/L — HIGH (ref 0–41)
BASOPHILS # BLD AUTO: 0.01 K/UL — SIGNIFICANT CHANGE UP (ref 0–0.2)
BASOPHILS NFR BLD AUTO: 0.1 % — SIGNIFICANT CHANGE UP (ref 0–1)
BILIRUB SERPL-MCNC: 0.3 MG/DL — SIGNIFICANT CHANGE UP (ref 0.2–1.2)
BILIRUB SERPL-MCNC: 0.5 MG/DL — SIGNIFICANT CHANGE UP (ref 0.2–1.2)
BILIRUB SERPL-MCNC: 0.5 MG/DL — SIGNIFICANT CHANGE UP (ref 0.2–1.2)
BLD GP AB SCN SERPL QL: SIGNIFICANT CHANGE UP
BUN SERPL-MCNC: 101 MG/DL — CRITICAL HIGH (ref 10–20)
BUN SERPL-MCNC: 93 MG/DL — CRITICAL HIGH (ref 10–20)
BUN SERPL-MCNC: 98 MG/DL — CRITICAL HIGH (ref 10–20)
CALCIUM SERPL-MCNC: 6.9 MG/DL — LOW (ref 8.5–10.1)
CALCIUM SERPL-MCNC: 7 MG/DL — LOW (ref 8.5–10.1)
CALCIUM SERPL-MCNC: 7.1 MG/DL — LOW (ref 8.5–10.1)
CHLORIDE SERPL-SCNC: 128 MMOL/L — HIGH (ref 98–110)
CHLORIDE SERPL-SCNC: 129 MMOL/L — HIGH (ref 98–110)
CHLORIDE SERPL-SCNC: 137 MMOL/L — HIGH (ref 98–110)
CHOLEST SERPL-MCNC: 83 MG/DL — SIGNIFICANT CHANGE UP
CO2 SERPL-SCNC: 21 MMOL/L — SIGNIFICANT CHANGE UP (ref 17–32)
CO2 SERPL-SCNC: 21 MMOL/L — SIGNIFICANT CHANGE UP (ref 17–32)
CO2 SERPL-SCNC: 22 MMOL/L — SIGNIFICANT CHANGE UP (ref 17–32)
CREAT SERPL-MCNC: 2.2 MG/DL — HIGH (ref 0.7–1.5)
CREAT SERPL-MCNC: 2.3 MG/DL — HIGH (ref 0.7–1.5)
CREAT SERPL-MCNC: 2.6 MG/DL — HIGH (ref 0.7–1.5)
CULTURE RESULTS: NO GROWTH — SIGNIFICANT CHANGE UP
EOSINOPHIL # BLD AUTO: 0.06 K/UL — SIGNIFICANT CHANGE UP (ref 0–0.7)
EOSINOPHIL NFR BLD AUTO: 0.6 % — SIGNIFICANT CHANGE UP (ref 0–8)
ESTIMATED AVERAGE GLUCOSE: 151 MG/DL — HIGH (ref 68–114)
GLUCOSE BLDC GLUCOMTR-MCNC: 162 MG/DL — HIGH (ref 70–99)
GLUCOSE BLDC GLUCOMTR-MCNC: 77 MG/DL — SIGNIFICANT CHANGE UP (ref 70–99)
GLUCOSE SERPL-MCNC: 131 MG/DL — HIGH (ref 70–99)
GLUCOSE SERPL-MCNC: 199 MG/DL — HIGH (ref 70–99)
GLUCOSE SERPL-MCNC: 209 MG/DL — HIGH (ref 70–99)
GRAM STN FLD: SIGNIFICANT CHANGE UP
HCT VFR BLD CALC: 26.3 % — LOW (ref 37–47)
HCT VFR BLD CALC: 31 % — LOW (ref 37–47)
HDLC SERPL-MCNC: 14 MG/DL — LOW
HGB BLD-MCNC: 7.9 G/DL — LOW (ref 12–16)
HGB BLD-MCNC: 9.3 G/DL — LOW (ref 12–16)
IMM GRANULOCYTES NFR BLD AUTO: 0.9 % — HIGH (ref 0.1–0.3)
LIPID PNL WITH DIRECT LDL SERPL: 19 MG/DL — SIGNIFICANT CHANGE UP
LYMPHOCYTES # BLD AUTO: 0.88 K/UL — LOW (ref 1.2–3.4)
LYMPHOCYTES # BLD AUTO: 9.1 % — LOW (ref 20.5–51.1)
MAGNESIUM SERPL-MCNC: 2.2 MG/DL — SIGNIFICANT CHANGE UP (ref 1.8–2.4)
MCHC RBC-ENTMCNC: 29.9 PG — SIGNIFICANT CHANGE UP (ref 27–31)
MCHC RBC-ENTMCNC: 30 G/DL — LOW (ref 32–37)
MCHC RBC-ENTMCNC: 30 G/DL — LOW (ref 32–37)
MCHC RBC-ENTMCNC: 30.1 PG — SIGNIFICANT CHANGE UP (ref 27–31)
MCV RBC AUTO: 100.3 FL — HIGH (ref 81–99)
MCV RBC AUTO: 99.6 FL — HIGH (ref 81–99)
MONOCYTES # BLD AUTO: 0.36 K/UL — SIGNIFICANT CHANGE UP (ref 0.1–0.6)
MONOCYTES NFR BLD AUTO: 3.7 % — SIGNIFICANT CHANGE UP (ref 1.7–9.3)
NEUTROPHILS # BLD AUTO: 8.29 K/UL — HIGH (ref 1.4–6.5)
NEUTROPHILS NFR BLD AUTO: 85.6 % — HIGH (ref 42.2–75.2)
NON HDL CHOLESTEROL: 69 MG/DL — SIGNIFICANT CHANGE UP
NRBC # BLD: 0 /100 WBCS — SIGNIFICANT CHANGE UP (ref 0–0)
NRBC # BLD: 0 /100 WBCS — SIGNIFICANT CHANGE UP (ref 0–0)
PLATELET # BLD AUTO: 52 K/UL — LOW (ref 130–400)
PLATELET # BLD AUTO: 54 K/UL — LOW (ref 130–400)
POTASSIUM SERPL-MCNC: 3.6 MMOL/L — SIGNIFICANT CHANGE UP (ref 3.5–5)
POTASSIUM SERPL-MCNC: 4.3 MMOL/L — SIGNIFICANT CHANGE UP (ref 3.5–5)
POTASSIUM SERPL-MCNC: 4.6 MMOL/L — SIGNIFICANT CHANGE UP (ref 3.5–5)
POTASSIUM SERPL-SCNC: 3.6 MMOL/L — SIGNIFICANT CHANGE UP (ref 3.5–5)
POTASSIUM SERPL-SCNC: 4.3 MMOL/L — SIGNIFICANT CHANGE UP (ref 3.5–5)
POTASSIUM SERPL-SCNC: 4.6 MMOL/L — SIGNIFICANT CHANGE UP (ref 3.5–5)
PROT SERPL-MCNC: 4.1 G/DL — LOW (ref 6–8)
PROT SERPL-MCNC: 4.8 G/DL — LOW (ref 6–8)
PROT SERPL-MCNC: 4.9 G/DL — LOW (ref 6–8)
RBC # BLD: 2.64 M/UL — LOW (ref 4.2–5.4)
RBC # BLD: 3.09 M/UL — LOW (ref 4.2–5.4)
RBC # FLD: 15.2 % — HIGH (ref 11.5–14.5)
RBC # FLD: 15.4 % — HIGH (ref 11.5–14.5)
SODIUM SERPL-SCNC: 159 MMOL/L — HIGH (ref 135–146)
SODIUM SERPL-SCNC: 160 MMOL/L — HIGH (ref 135–146)
SODIUM SERPL-SCNC: 170 MMOL/L — CRITICAL HIGH (ref 135–146)
SPECIMEN SOURCE: SIGNIFICANT CHANGE UP
SPECIMEN SOURCE: SIGNIFICANT CHANGE UP
TRIGL SERPL-MCNC: 203 MG/DL — HIGH
TROPONIN T SERPL-MCNC: 0.13 NG/ML — CRITICAL HIGH
TSH SERPL-MCNC: 1.39 UIU/ML — SIGNIFICANT CHANGE UP (ref 0.27–4.2)
WBC # BLD: 10.97 K/UL — HIGH (ref 4.8–10.8)
WBC # BLD: 9.69 K/UL — SIGNIFICANT CHANGE UP (ref 4.8–10.8)
WBC # FLD AUTO: 10.97 K/UL — HIGH (ref 4.8–10.8)
WBC # FLD AUTO: 9.69 K/UL — SIGNIFICANT CHANGE UP (ref 4.8–10.8)

## 2022-02-10 PROCEDURE — 99231 SBSQ HOSP IP/OBS SF/LOW 25: CPT

## 2022-02-10 PROCEDURE — 71045 X-RAY EXAM CHEST 1 VIEW: CPT | Mod: 26

## 2022-02-10 PROCEDURE — 99233 SBSQ HOSP IP/OBS HIGH 50: CPT

## 2022-02-10 RX ORDER — POTASSIUM CHLORIDE 20 MEQ
20 PACKET (EA) ORAL ONCE
Refills: 0 | Status: COMPLETED | OUTPATIENT
Start: 2022-02-10 | End: 2022-02-10

## 2022-02-10 RX ORDER — COLLAGENASE CLOSTRIDIUM HIST. 250 UNIT/G
1 OINTMENT (GRAM) TOPICAL
Refills: 0 | Status: DISCONTINUED | OUTPATIENT
Start: 2022-02-10 | End: 2022-03-01

## 2022-02-10 RX ORDER — SODIUM CHLORIDE 9 MG/ML
1000 INJECTION, SOLUTION INTRAVENOUS
Refills: 0 | Status: DISCONTINUED | OUTPATIENT
Start: 2022-02-10 | End: 2022-02-11

## 2022-02-10 RX ADMIN — HEPARIN SODIUM 5000 UNIT(S): 5000 INJECTION INTRAVENOUS; SUBCUTANEOUS at 07:05

## 2022-02-10 RX ADMIN — AMPICILLIN SODIUM AND SULBACTAM SODIUM 100 GRAM(S): 250; 125 INJECTION, POWDER, FOR SUSPENSION INTRAMUSCULAR; INTRAVENOUS at 07:05

## 2022-02-10 RX ADMIN — Medication 1 APPLICATION(S): at 11:33

## 2022-02-10 RX ADMIN — SODIUM CHLORIDE 150 MILLILITER(S): 9 INJECTION, SOLUTION INTRAVENOUS at 10:04

## 2022-02-10 RX ADMIN — Medication 4.22 MICROGRAM(S)/KG/MIN: at 23:19

## 2022-02-10 RX ADMIN — CHLORHEXIDINE GLUCONATE 15 MILLILITER(S): 213 SOLUTION TOPICAL at 07:24

## 2022-02-10 RX ADMIN — CHLORHEXIDINE GLUCONATE 1 APPLICATION(S): 213 SOLUTION TOPICAL at 07:12

## 2022-02-10 RX ADMIN — Medication 50 MILLIEQUIVALENT(S): at 08:00

## 2022-02-10 RX ADMIN — SODIUM CHLORIDE 100 MILLILITER(S): 9 INJECTION, SOLUTION INTRAVENOUS at 14:22

## 2022-02-10 RX ADMIN — AMPICILLIN SODIUM AND SULBACTAM SODIUM 100 GRAM(S): 250; 125 INJECTION, POWDER, FOR SUSPENSION INTRAMUSCULAR; INTRAVENOUS at 17:25

## 2022-02-10 RX ADMIN — HEPARIN SODIUM 5000 UNIT(S): 5000 INJECTION INTRAVENOUS; SUBCUTANEOUS at 17:26

## 2022-02-10 RX ADMIN — FENTANYL CITRATE 2.25 MICROGRAM(S)/KG/HR: 50 INJECTION INTRAVENOUS at 17:28

## 2022-02-10 RX ADMIN — CHLORHEXIDINE GLUCONATE 15 MILLILITER(S): 213 SOLUTION TOPICAL at 17:26

## 2022-02-10 RX ADMIN — Medication 4.22 MICROGRAM(S)/KG/MIN: at 14:22

## 2022-02-10 NOTE — PROGRESS NOTE ADULT - ASSESSMENT
Pt is 87 y/o Female w/ PMH/o HTN admitted for respiratory failure requiring intubation; found to have extensive partial thickness wounds to entire body.   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  S/p skin biopsy to right hand by burn on 2/9.      # Diffuse partial thickness wounds with devitalized skin scattered all over entire body: ?bullous pemphigoid vs SJS   - continue wound care daily: wash all wounds with soap and water, apply A&D ointment, and cover with xeroform; if dressings remain clean - can perform dressing changes every other day.;   - pain management  - f/u skin biopsy reult  - Wound care as above until Dermatology recommendation made .        Pt is 87 y/o Female w/ PMH/o HTN admitted for respiratory failure requiring intubation; found to have extensive partial thickness wounds to entire body.   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  S/p skin biopsy to right hand by burn on 2/9.      # Diffuse partial thickness wounds with devitalized skin scattered all over entire body: ?bullous pemphigoid vs pemphigous vulgaris   - continue wound care daily: wash all wounds with soap and water, apply A&D ointment, and cover with xeroform; if dressings remain clean - can perform dressing changes every other day.;   - pain management  - f/u skin biopsy reult  - Wound care as above until Dermatology recommendation made .

## 2022-02-10 NOTE — CONSULT NOTE ADULT - SUBJECTIVE AND OBJECTIVE BOX
HPI:  87 y/o F w/ PMH/o HTN (as per son at bedside) presenting to the hospital BIBA after experiencing respiratory distress. According to the patients family, the patient was being fed when she began choking and had difficulty breathing. Pt was bag masked by EMS and transported to the hospital. According to the family, the patient began to experience diffuse skin reaction across her entire body as well as ulcerations in her mouth for the past month. The family denied noticing fever or chills.    In the ED, patient was tachypneic and hypoxic,  intubated for airway protection. Pt was found to hypothermic, started on warming blanket. Pt started on IV levophed. S/p IV vanc/ cefepime. called to evaluate         (09 Feb 2022 04:51)      PAST MEDICAL & SURGICAL HISTORY      FAMILY HISTORY:  FAMILY HISTORY:      SOCIAL HISTORY:  []smoker  []Alcohol  []Drug    ALLERGIES:  No Known Allergies      MEDICATIONS:  MEDICATIONS  (STANDING):  ampicillin/sulbactam  IVPB      ampicillin/sulbactam  IVPB 1.5 Gram(s) IV Intermittent every 12 hours  chlorhexidine 0.12% Liquid 15 milliLiter(s) Oral Mucosa every 12 hours  chlorhexidine 4% Liquid 1 Application(s) Topical <User Schedule>  dextrose 5%. 1000 milliLiter(s) (150 mL/Hr) IV Continuous <Continuous>  fentaNYL   Infusion. 0.5 MICROgram(s)/kG/Hr (2.25 mL/Hr) IV Continuous <Continuous>  heparin   Injectable 5000 Unit(s) SubCutaneous every 12 hours  influenza  Vaccine (HIGH DOSE) 0.7 milliLiter(s) IntraMuscular once  norepinephrine Infusion 0.05 MICROgram(s)/kG/Min (4.22 mL/Hr) IV Continuous <Continuous>  vitamin A &amp; D Ointment 1 Application(s) Topical daily    MEDICATIONS  (PRN):      HOME MEDICATIONS:  Home Medications:      VITALS:   T(F): 96.8 (02-10 @ 09:00), Max: 98.6 (02-09 @ 08:30)  HR: 85 (02-10 @ 11:00) (59 - 96)  BP: 124/52 (02-10 @ 10:00) (79/41 - 209/89)  BP(mean): 76 (02-10 @ 10:00) (55 - 128)  RR: 14 (02-10 @ 11:00) (8 - 23)  SpO2: 100% (02-10 @ 11:00) (69% - 100%)    I&O's Summary    09 Feb 2022 07:01  -  10 Feb 2022 07:00  --------------------------------------------------------  IN: 1620.6 mL / OUT: 385 mL / NET: 1235.6 mL    10 Feb 2022 07:01  -  10 Feb 2022 11:36  --------------------------------------------------------  IN: 798 mL / OUT: 125 mL / NET: 673 mL        REVIEW OF SYSTEMS:  CONSTITUTIONAL: No weakness, fevers or chills  EYES: No visual changes  ENT: No vertigo or throat pain   NECK: No pain or stiffness  RESPIRATORY: No cough, wheezing, hemoptysis; No shortness of breath  CARDIOVASCULAR: No chest pain or palpitations  GASTROINTESTINAL: No abdominal or epigastric pain. No nausea, vomiting, or hematemesis; No diarrhea or constipation. No melena or hematochezia.  GENITOURINARY: No dysuria, frequency or hematuria  NEUROLOGICAL: No numbness or weakness  SKIN: No itching, no rashes  MSK: No pain    PHYSICAL EXAM:  CONST: elderly female, intubated  NECK:  supple, no masses, no significant lymphadenopathy  CARDIAC:  regular rate and rhythm, normal S1 and S2  RESP:  decreased breath sounds on the basis bilaterally  ABDOMEN:  soft, nontender  SKIN:  + diffuse bullae    LABS:                        7.9    9.69  )-----------( 54       ( 10 Feb 2022 04:30 )             26.3     02-10    170<HH>  |  137<H>  |  101<HH>  ----------------------------<  209<H>  3.6   |  22  |  2.6<H>    Ca    7.1<L>      10 Feb 2022 04:30  Mg     2.2     02-10    TPro  4.1<L>  /  Alb  1.7<L>  /  TBili  0.3  /  DBili  x   /  AST  52<H>  /  ALT  42<H>  /  AlkPhos  62  02-10    PT/INR - ( 08 Feb 2022 23:55 )   PT: 16.40 sec;   INR: 1.43 ratio         PTT - ( 08 Feb 2022 23:55 )  PTT:35.0 sec  Troponin T, Serum: 0.13 ng/mL *HH* (02-10-22 @ 04:30)  Lactate, Blood: 2.7 mmol/L *H* (02-09-22 @ 17:41)  Troponin T, Serum: 0.12 ng/mL *HH* (02-09-22 @ 17:41)  Lactate, Blood: 2.6 mmol/L *H* (02-09-22 @ 11:54)    CARDIAC MARKERS ( 10 Feb 2022 04:30 )  x     / 0.13 ng/mL / x     / x     / x      CARDIAC MARKERS ( 09 Feb 2022 17:41 )  x     / 0.12 ng/mL / x     / x     / x      CARDIAC MARKERS ( 08 Feb 2022 23:55 )  x     / 0.10 ng/mL / x     / x     / x            Troponin trend:    Serum Pro-Brain Natriuretic Peptide: 1100 pg/mL (02-08-22 @ 23:55)    02-10 Chol 83 LDL -- HDL 14<L> Trig 203<H>       HPI:  89 y/o F w/ PMH/o HTN (as per son at bedside) presenting to the hospital BIBA after experiencing respiratory distress. According to the patients family, the patient was being fed when she began choking and had difficulty breathing. Pt was bag masked by EMS and transported to the hospital. According to the family, the patient began to experience diffuse skin reaction across her entire body as well as ulcerations in her mouth for the past month. The family denied noticing fever or chills.    In the ED, patient was tachypneic and hypoxic,  intubated for airway protection. Pt was found to hypothermic, started on warming blanket. Pt started on IV levophed. S/p IV vanc/ cefepime. called to evaluate         (09 Feb 2022 04:51)      PAST MEDICAL & SURGICAL HISTORY      FAMILY HISTORY:  FAMILY HISTORY:      SOCIAL HISTORY:  []smoker  []Alcohol  []Drug    ALLERGIES:  No Known Allergies      MEDICATIONS:  MEDICATIONS  (STANDING):  ampicillin/sulbactam  IVPB      ampicillin/sulbactam  IVPB 1.5 Gram(s) IV Intermittent every 12 hours  chlorhexidine 0.12% Liquid 15 milliLiter(s) Oral Mucosa every 12 hours  chlorhexidine 4% Liquid 1 Application(s) Topical <User Schedule>  dextrose 5%. 1000 milliLiter(s) (150 mL/Hr) IV Continuous <Continuous>  fentaNYL   Infusion. 0.5 MICROgram(s)/kG/Hr (2.25 mL/Hr) IV Continuous <Continuous>  heparin   Injectable 5000 Unit(s) SubCutaneous every 12 hours  influenza  Vaccine (HIGH DOSE) 0.7 milliLiter(s) IntraMuscular once  norepinephrine Infusion 0.05 MICROgram(s)/kG/Min (4.22 mL/Hr) IV Continuous <Continuous>  vitamin A &amp; D Ointment 1 Application(s) Topical daily    MEDICATIONS  (PRN):      HOME MEDICATIONS:  Home Medications:      VITALS:   T(F): 96.8 (02-10 @ 09:00), Max: 98.6 (02-09 @ 08:30)  HR: 85 (02-10 @ 11:00) (59 - 96)  BP: 124/52 (02-10 @ 10:00) (79/41 - 209/89)  BP(mean): 76 (02-10 @ 10:00) (55 - 128)  RR: 14 (02-10 @ 11:00) (8 - 23)  SpO2: 100% (02-10 @ 11:00) (69% - 100%)    I&O's Summary    09 Feb 2022 07:01  -  10 Feb 2022 07:00  --------------------------------------------------------  IN: 1620.6 mL / OUT: 385 mL / NET: 1235.6 mL    10 Feb 2022 07:01  -  10 Feb 2022 11:36  --------------------------------------------------------  IN: 798 mL / OUT: 125 mL / NET: 673 mL        REVIEW OF SYSTEMS:  CONSTITUTIONAL: No weakness, fevers or chills  EYES: No visual changes  ENT: No vertigo or throat pain   NECK: No pain or stiffness  RESPIRATORY: No cough, wheezing, hemoptysis; No shortness of breath  CARDIOVASCULAR: No chest pain or palpitations  GASTROINTESTINAL: No abdominal or epigastric pain. No nausea, vomiting, or hematemesis; No diarrhea or constipation. No melena or hematochezia.  GENITOURINARY: No dysuria, frequency or hematuria  NEUROLOGICAL: No numbness or weakness  MSK: No pain    PHYSICAL EXAM:  CONST: elderly female, intubated  NECK:  supple, no masses, no significant lymphadenopathy  CARDIAC:  regular rate and rhythm, normal S1 and S2  RESP:  decreased breath sounds on the basis bilaterally  ABDOMEN:  soft, nontender  SKIN: (photos reviewed) diffuse erosions, Nikolsky sign reprted    LABS:                        7.9    9.69  )-----------( 54       ( 10 Feb 2022 04:30 )             26.3     02-10    170<HH>  |  137<H>  |  101<HH>  ----------------------------<  209<H>  3.6   |  22  |  2.6<H>    Ca    7.1<L>      10 Feb 2022 04:30  Mg     2.2     02-10    TPro  4.1<L>  /  Alb  1.7<L>  /  TBili  0.3  /  DBili  x   /  AST  52<H>  /  ALT  42<H>  /  AlkPhos  62  02-10    PT/INR - ( 08 Feb 2022 23:55 )   PT: 16.40 sec;   INR: 1.43 ratio         PTT - ( 08 Feb 2022 23:55 )  PTT:35.0 sec  Troponin T, Serum: 0.13 ng/mL *HH* (02-10-22 @ 04:30)  Lactate, Blood: 2.7 mmol/L *H* (02-09-22 @ 17:41)  Troponin T, Serum: 0.12 ng/mL *HH* (02-09-22 @ 17:41)  Lactate, Blood: 2.6 mmol/L *H* (02-09-22 @ 11:54)    CARDIAC MARKERS ( 10 Feb 2022 04:30 )  x     / 0.13 ng/mL / x     / x     / x      CARDIAC MARKERS ( 09 Feb 2022 17:41 )  x     / 0.12 ng/mL / x     / x     / x      CARDIAC MARKERS ( 08 Feb 2022 23:55 )  x     / 0.10 ng/mL / x     / x     / x            Troponin trend:    Serum Pro-Brain Natriuretic Peptide: 1100 pg/mL (02-08-22 @ 23:55)    02-10 Chol 83 LDL -- HDL 14<L> Trig 203<H>

## 2022-02-10 NOTE — PROGRESS NOTE ADULT - ASSESSMENT
88y Female with PMH HTN presenting to the hospital BIBA after experiencing respiratory distress. Nephrology consulted for sodium >180 and evidence of JILL in setting of unknown baseline kidney function.     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

## 2022-02-10 NOTE — PROGRESS NOTE ADULT - SUBJECTIVE AND OBJECTIVE BOX
Patient is a 88y old  Female who presents with a chief complaint of respiratory failure (2022 00:24)        HPI:  87 y/o F w/ PMH/o HTN (as per son at bedside) presenting to the hospital [facility] after experiencing respiratory distress. According to the patients family, the patient was being fed when she began choking and had difficulty breathing. Pt was bag masked by EMS and transported to the hospital. According to the family, the patient began to experience diffuse skin reaction across her entire body as well as ulcerations in her mouth for the past month. The family denied noticing fever or chills.    In the ED, patient was tachypneic and hypoxic,  intubated for airway protection. Pt was found to hypothermic, started on warming blanket. Pt started on IV levophed. S/p IV vanc/ cefepime. called to evaluate         (2022 04:51)      Pt evaluated on rounds.  I reviewed the radiology tests and hospital record.    I reviewed previous notes on this patient.    Interval Events: No overnight events.      REVIEW OF SYSTEMS:   see HPI      OBJECTIVE:  ICU Vital Signs Last 24 Hrs  T(C): 36.2 (2022 19:00), Max: 37 (2022 08:30)  T(F): 97.2 (2022 19:00), Max: 98.6 (2022 08:30)  HR: 73 (10 Feb 2022 03:15) (63 - 94)  BP: 95/47 (10 Feb 2022 03:15) (91/46 - 175/80)  BP(mean): 68 (10 Feb 2022 03:15) (66 - 115)  ABP: 111/37 (2022 23:30) (-2/-3 - 115/42)  ABP(mean): 46 (10 Feb 2022 03:15) (-2 - 74)  RR: 16 (10 Feb 2022 03:15) (13 - 23)  SpO2: 100% (10 Feb 2022 03:15) (85% - 100%)    Mode: AC/ CMV (Assist Control/ Continuous Mandatory Ventilation), RR (machine): 16, TV (machine): 300, FiO2: 40, PEEP: 5, ITime: 1, MAP: 9, PIP: 24    02-08 @ 07:01  -  02-09 @ 07:00  --------------------------------------------------------  IN: 125.6 mL / OUT: 280 mL / NET: -154.4 mL     @ 07:01  -  02-10 @ 06:16  --------------------------------------------------------  IN: 1450 mL / OUT: 170 mL / NET: 1280 mL      CAPILLARY BLOOD GLUCOSE      POCT Blood Glucose.: 92 mg/dL (2022 12:39)        PHYSICAL EXAM:       · ENMT:   Airway patent,   Nasal mucosa clear.  Mouth with normal mucosa.   No thrush    · EYES:   Clear bilaterally,   pupils equal,   round and reactive to light.    · CARDIAC:   Normal rate,   regular rhythm.    Heart sounds S1, S2.   No murmurs, no rubs or gallops on auscultation  no edema        CAROTID:   normal systolic impulse  no bruits    · RESPIRATORY:   rales  normal chest expansion  no retractions or use of accessory muscles  palpation of chest is normal with no fremitus  percussion of chest demonstrates no hyperresonance or dullness    · GASTROINTESTINAL:  Abdomen soft,   non-tender,   + BS  liver/spleen not palpable    · MUSCULOSKELETAL:   no clubbing, cyanosis      · SKIN:   Skin normal color for race,   warm, dry   No evidence of rash.        · HEME LYMPH:   no splenomegaly.  No cervical  lymphadenopathy.  no inguinal lymphadenopathy    HOSPITAL MEDICATIONS:  MEDICATIONS  (STANDING):  ampicillin/sulbactam  IVPB      ampicillin/sulbactam  IVPB 1.5 Gram(s) IV Intermittent every 12 hours  chlorhexidine 0.12% Liquid 15 milliLiter(s) Oral Mucosa every 12 hours  chlorhexidine 4% Liquid 1 Application(s) Topical <User Schedule>  dextrose 5%. 1000 milliLiter(s) (150 mL/Hr) IV Continuous <Continuous>  fentaNYL   Infusion. 0.5 MICROgram(s)/kG/Hr (2.25 mL/Hr) IV Continuous <Continuous>  heparin   Injectable 5000 Unit(s) SubCutaneous every 12 hours  influenza  Vaccine (HIGH DOSE) 0.7 milliLiter(s) IntraMuscular once  norepinephrine Infusion 0.05 MICROgram(s)/kG/Min (4.22 mL/Hr) IV Continuous <Continuous>  vitamin A &amp; D Ointment 1 Application(s) Topical daily    MEDICATIONS  (PRN):    lactated ringers Bolus:   1000 milliLiter(s), IV Bolus, once, infuse over 60 Minute(s), Stop After 1 Doses  lactated ringers.: Solution, 1000 milliLiter(s) infuse at 60 mL/Hr  lactated ringers Bolus:   2000 milliLiter(s), IV Bolus, once, infuse over 60 Minute(s), Stop After 1 Doses  Provider's Contact #: [phone]      LABS:                        7.9    9.69  )-----------( 54       ( 10 Feb 2022 04:30 )             26.3     02-10    170<HH>  |  137<H>  |  101<HH>  ----------------------------<  209<H>  3.6   |  22  |  2.6<H>    Ca    7.1<L>      10 Feb 2022 04:30  Mg     2.2     0210    TPro  4.1<L>  /  Alb  1.7<L>  /  TBili  0.3  /  DBili  x   /  AST  52<H>  /  ALT  42<H>  /  AlkPhos  62  02-10    PT/INR - ( 2022 23:55 )   PT: 16.40 sec;   INR: 1.43 ratio         PTT - ( 2022 23:55 )  PTT:35.0 sec  Urinalysis Basic - ( 2022 00:00 )    Color: Yellow / Appearance: Slightly Turbid / S.026 / pH: x  Gluc: x / Ketone: Trace  / Bili: Negative / Urobili: <2 mg/dL   Blood: x / Protein: 30 mg/dL / Nitrite: Negative   Leuk Esterase: Negative / RBC: 5 /HPF / WBC 3 /HPF   Sq Epi: x / Non Sq Epi: 2 /HPF / Bacteria: Negative      Arterial Blood Gas:  02-10 @ 03:47  7.31/44/172/22/100.0/-4.1  ABG lactate: --  Arterial Blood Gas:   @ 15:49  7.33/43/147/23/99.8/-3.2  ABG lactate: --  Arterial Blood Gas:   @ 03:40  7.31/46/379/23/100.0/-3.1  ABG lactate: --    Venous Blood Gas:   @ 03:36  7.27/54/63/25/89.3  VBG Lactate: 3.10  Venous Blood Gas:   @ 23:42  7.31/45/39/23/57.0  VBG Lactate: 4.40    Mode: AC/ CMV (Assist Control/ Continuous Mandatory Ventilation), RR (machine): 16, TV (machine): 300, FiO2: 40, PEEP: 5, ITime: 1, MAP: 9, PIP: 24  CARDIAC MARKERS ( 10 Feb 2022 04:30 )  x     / 0.13 ng/mL / x     / x     / x      CARDIAC MARKERS ( 2022 17:41 )  x     / 0.12 ng/mL / x     / x     / x      CARDIAC MARKERS ( 2022 23:55 )  x     / 0.10 ng/mL / x     / x     / x          COVID-19 PCR: NotDetec (2022 00:00)    Mode: AC/ CMV (Assist Control/ Continuous Mandatory Ventilation)  RR (machine): 16  TV (machine): 300  FiO2: 40  PEEP: 5  ITime: 1  MAP: 9  PIP: 24      ABG - ( 10 Feb 2022 03:47 )  pH, Arterial: 7.31  pH, Blood: x     /  pCO2: 44    /  pO2: 172   / HCO3: 22    / Base Excess: -4.1  /  SaO2: 100.0       RADIOLOGY: Today I personally interpreted the latest CXR and other pertinent films.

## 2022-02-10 NOTE — PROGRESS NOTE ADULT - ASSESSMENT
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  - Unasyn as per ID  - F/u biopsy by Burn  - F/u Derm      DVT ppx: HSQ  Right radial A line 2/9

## 2022-02-10 NOTE — PROGRESS NOTE ADULT - SUBJECTIVE AND OBJECTIVE BOX
89 yo female with extensive chronic blistering skin condition who presented in respiratory distress; now intubated.     Burn following patient for wounds to BUE, BLE, torso, buttocks and perioral. Seen in ED yesterday.   Nurse at bedside also reports an unstageable sacral pressure ulcer.   2/10 S/p skin biopsy to right hand.    AM rounds       Vital Signs Last 24 Hrs  T(C): 36.9 (10 Feb 2022 15:00), Max: 36.9 (10 Feb 2022 00:45)  T(F): 98.4 (10 Feb 2022 15:00), Max: 98.4 (10 Feb 2022 00:45)  HR: 92 (10 Feb 2022 15:25) (61 - 92)  BP: 104/56 (10 Feb 2022 15:00) (91/46 - 155/65)  BP(mean): 72 (10 Feb 2022 15:00) (59 - 93)  RR: 16 (10 Feb 2022 15:25) (11 - 20)  SpO2: 100% (10 Feb 2022 15:25) (90% - 100%)          02-10                          9.3    10.97 )-----------( 52       ( 10 Feb 2022 11:36 )             31.0         EXAM:   General: seen at bedside, intubated, on vent support, sedated  Skin/Wound: Large partial thickness wounds that are pink, moist, healthy appearing to BUE, BLE and back. To abdomen and buttocks there are smaller scattered areas of dry healed areas mixed with small partial thickness wounds. No purulent drainage. Minimal bleeding.   Sacrum: ~ 8x5cm area of black eschar - no active bleeding, no erythema, no purulence, no odor.          89 yo female with extensive chronic blistering skin condition who presented in respiratory distress; now intubated.     Burn following patient for wounds to BUE, BLE, torso, buttocks and perioral. Seen in ED yesterday.   Nurse at bedside also reports an unstageable sacral pressure ulcer.   2/10 S/p skin biopsy to right hand.    AM rounds       Vital Signs Last 24 Hrs  T(C): 36.9 (10 Feb 2022 15:00), Max: 36.9 (10 Feb 2022 00:45)  T(F): 98.4 (10 Feb 2022 15:00), Max: 98.4 (10 Feb 2022 00:45)  HR: 92 (10 Feb 2022 15:25) (61 - 92)  BP: 104/56 (10 Feb 2022 15:00) (91/46 - 155/65)  BP(mean): 72 (10 Feb 2022 15:00) (59 - 93)  RR: 16 (10 Feb 2022 15:25) (11 - 20)  SpO2: 100% (10 Feb 2022 15:25) (90% - 100%)          02-10                          9.3    10.97 )-----------( 52       ( 10 Feb 2022 11:36 )             31.0         EXAM:   General: seen at bedside, intubated, on vent support, sedated  Skin/Wound: Large partial thickness wounds that are pink, moist, healthy appearing to BUE, BLE and back. To abdomen and buttocks there are smaller scattered areas of dry healed areas mixed with small partial thickness wounds. No purulent drainage. Minimal bleeding.   Sacrum: ~ 8x7cm wound with 4 x 5 cm area of black eschar - no active bleeding, no erythema, no purulence, no odor.

## 2022-02-10 NOTE — PROGRESS NOTE ADULT - ASSESSMENT
IMPRESSION:  Acute Hypoxic Respiratory Failure 2/2 aspiration  Severe hypernatremia  HT1: Mild Hypothermia  Severely reduced kidney function (Possible prerenal JILL, baseline Cr unknown)  HTN? History limited  ? SJS    PLAN:    CNS: fentanyl for sedation,  wean off as tolerated    HEENT: Oral care    PULMONARY: intubated.   continue O2 as necessary to maintain sats > 90%<94   HOB @ 45 degrees.    Aspiration precautions.   daily  ABG/ CXR,     CARDIOVASCULAR:   IV levophed,   titrate down as tolerated,   goal directed fluids    GI: GI prophylaxis.  Bowel regimen     RENAL:    cont D5W.   Follow up lytes.    Correct as needed. F/u urine lytes/osmol    INFECTIOUS DISEASE:  abx per ID  Follow up cultures.    HEMATOLOGICAL:  DVT prophylaxis.  f/u skin bx    ENDOCRINE:  Follow up FS. SS. A1c. TSH.    CCT 35 min

## 2022-02-10 NOTE — PROGRESS NOTE ADULT - SUBJECTIVE AND OBJECTIVE BOX
Nephrology progress note    Patient is seen and examined, events over the last 24 h noted .  On D5W 150cc/hr and Levo  Allergies:  No Known Allergies    Hospital Medications:   MEDICATIONS  (STANDING):  ampicillin/sulbactam  IVPB      ampicillin/sulbactam  IVPB 1.5 Gram(s) IV Intermittent every 12 hours  chlorhexidine 0.12% Liquid 15 milliLiter(s) Oral Mucosa every 12 hours  chlorhexidine 4% Liquid 1 Application(s) Topical <User Schedule>  dextrose 5%. 1000 milliLiter(s) (150 mL/Hr) IV Continuous <Continuous>  fentaNYL   Infusion. 0.5 MICROgram(s)/kG/Hr (2.25 mL/Hr) IV Continuous <Continuous>  heparin   Injectable 5000 Unit(s) SubCutaneous every 12 hours  influenza  Vaccine (HIGH DOSE) 0.7 milliLiter(s) IntraMuscular once  norepinephrine Infusion 0.05 MICROgram(s)/kG/Min (4.22 mL/Hr) IV Continuous <Continuous>  vitamin A &amp; D Ointment 1 Application(s) Topical daily        VITALS:  T(F): 96.6 (02-10-22 @ 12:00), Max: 98.4 (02-10-22 @ 00:45)  HR: 81 (02-10-22 @ 12:26)  BP: 123/58 (02-10-22 @ 12:00)  RR: 15 (02-10-22 @ 12:26)  SpO2: 100% (02-10-22 @ 12:26)  Wt(kg): --    :  -   @ 07:00  --------------------------------------------------------  IN: 125.6 mL / OUT: 280 mL / NET: -154.4 mL     @ :01  -  02-10 @ 07:00  --------------------------------------------------------  IN: 1620.6 mL / OUT: 385 mL / NET: 1235.6 mL    02-10 @ 07:01  -  02-10 @ 12:47  --------------------------------------------------------  IN: 975.4 mL / OUT: 145 mL / NET: 830.4 mL          PHYSICAL EXAM:  Constitutional: On vent  Neck: No JVD  Respiratory: CTA  Cardiovascular: S1, S2, RRR  Gastrointestinal: BS+, soft, NT/ND  Extremities mild peripheral edema  Neurological: sedated  : + nesbitt.   Skin: extensive areas of skin ulcers  Vascular Access:    LABS:  02-10    159<H>  |  128<H>  |  98<HH>  ----------------------------<  199<H>  4.6   |  21  |  2.3<H>  SODIUM TREND:  Sodium 159 [02-10 @ 11:36]  Sodium 170 [02-10 @ 04:30]  Sodium 180 [ @ 17:41]  Sodium 176 [ @ 11:54]  Sodium 180 [ @ 11:00]  Sodium 173 [ @ 03:30]  Sodium >180 [ @ 23:55]    Ca    7.0<L>      10 Feb 2022 11:36  Mg     2.2     02-10    TPro  4.9<L>  /  Alb  2.2<L>  /  TBili  0.5  /  DBili      /  AST  69<H>  /  ALT  50<H>  /  AlkPhos  70  02-10                          9.3    10.97 )-----------( 52       ( 10 Feb 2022 11:36 )             31.0       Urine Studies:  Urinalysis Basic - ( 2022 00:00 )    Color: Yellow / Appearance: Slightly Turbid / S.026 / pH:   Gluc:  / Ketone: Trace  / Bili: Negative / Urobili: <2 mg/dL   Blood:  / Protein: 30 mg/dL / Nitrite: Negative   Leuk Esterase: Negative / RBC: 5 /HPF / WBC 3 /HPF   Sq Epi:  / Non Sq Epi: 2 /HPF / Bacteria: Negative      Sodium, Random Urine: 45.0 mmoL/L ( @ 18:52)  Osmolality, Random Urine: 598 mos/kg ( @ 18:52)  Creatinine, Random Urine: 116 mg/dL ( @ 18:52)    RADIOLOGY & ADDITIONAL STUDIES:  < from: Xray Chest 1 View- PORTABLE-Routine (Xray Chest 1 View- PORTABLE-Routine in AM.) (02.10.22 @ 06:21) >  Impression:    Right basilar subsegmental opacity. No pneumothorax    < end of copied text >

## 2022-02-10 NOTE — PROGRESS NOTE ADULT - SUBJECTIVE AND OBJECTIVE BOX
ARI MA  88y, Female    All available historical data reviewed    OVERNIGHT EVENTS:  no fevers  fio2 40%  on pressors  decreased urine outpt  no diarrhea    ROS:  unable to obtain history secondary to patient's mental status and/or sedation     VITALS:  T(F): 96.8, Max: 98.4 (02-10-22 @ 00:45)  HR: 62  BP: 124/52  RR: 16Vital Signs Last 24 Hrs  T(C): 36 (10 Feb 2022 09:00), Max: 36.9 (10 Feb 2022 00:45)  T(F): 96.8 (10 Feb 2022 09:00), Max: 98.4 (10 Feb 2022 00:45)  HR: 62 (10 Feb 2022 10:07) (61 - 94)  BP: 124/52 (10 Feb 2022 10:00) (91/46 - 175/80)  BP(mean): 76 (10 Feb 2022 10:00) (59 - 115)  RR: 16 (10 Feb 2022 10:07) (11 - 23)  SpO2: 100% (10 Feb 2022 10:07) (85% - 100%)    TESTS & MEASUREMENTS:                        7.9    9.69  )-----------( 54       ( 10 Feb 2022 04:30 )             26.3     0210    170<HH>  |  137<H>  |  101<HH>  ----------------------------<  209<H>  3.6   |  22  |  2.6<H>    Ca    7.1<L>      10 Feb 2022 04:30  Mg     2.2     02-10    TPro  4.1<L>  /  Alb  1.7<L>  /  TBili  0.3  /  DBili  x   /  AST  52<H>  /  ALT  42<H>  /  AlkPhos  62  10    LIVER FUNCTIONS - ( 10 Feb 2022 04:30 )  Alb: 1.7 g/dL / Pro: 4.1 g/dL / ALK PHOS: 62 U/L / ALT: 42 U/L / AST: 52 U/L / GGT: x             Culture - Sputum (collected 22 @ 18:42)  Source: .Sputum Sputum  Gram Stain (02-10-22 @ 07:17):    Moderate polymorphonuclear leukocytes per low power field    No Squamous epithelial cells per low power field    No organisms seen per oil power field    Culture - Urine (collected 22 @ 00:00)  Source: Clean Catch Clean Catch (Midstream)  Final Report (02-10-22 @ 06:28):    No growth    Culture - Blood (collected 22 @ 23:55)  Source: .Blood Blood  Preliminary Report (02-10-22 @ 07:01):    No growth to date.    Culture - Blood (collected 22 @ 23:55)  Source: .Blood Blood  Preliminary Report (02-10-22 @ 07:01):    No growth to date.      Urinalysis Basic - ( 2022 00:00 )    Color: Yellow / Appearance: Slightly Turbid / S.026 / pH: x  Gluc: x / Ketone: Trace  / Bili: Negative / Urobili: <2 mg/dL   Blood: x / Protein: 30 mg/dL / Nitrite: Negative   Leuk Esterase: Negative / RBC: 5 /HPF / WBC 3 /HPF   Sq Epi: x / Non Sq Epi: 2 /HPF / Bacteria: Negative          RADIOLOGY & ADDITIONAL TESTS:  Personal review of radiological diagnostics performed  Echo and EKG results noted when applicable.     MEDICATIONS:  ampicillin/sulbactam  IVPB      ampicillin/sulbactam  IVPB 1.5 Gram(s) IV Intermittent every 12 hours  chlorhexidine 0.12% Liquid 15 milliLiter(s) Oral Mucosa every 12 hours  chlorhexidine 4% Liquid 1 Application(s) Topical <User Schedule>  dextrose 5%. 1000 milliLiter(s) IV Continuous <Continuous>  fentaNYL   Infusion. 0.5 MICROgram(s)/kG/Hr IV Continuous <Continuous>  heparin   Injectable 5000 Unit(s) SubCutaneous every 12 hours  influenza  Vaccine (HIGH DOSE) 0.7 milliLiter(s) IntraMuscular once  norepinephrine Infusion 0.05 MICROgram(s)/kG/Min IV Continuous <Continuous>  vitamin A &amp; D Ointment 1 Application(s) Topical daily      ANTIBIOTICS:  ampicillin/sulbactam  IVPB      ampicillin/sulbactam  IVPB 1.5 Gram(s) IV Intermittent every 12 hours

## 2022-02-10 NOTE — CONSULT NOTE ADULT - ASSESSMENT
87 y/o F w/ PMH/o HTN (as per son at bedside) presenting to the hospital BIBA after experiencing respiratory distress. According to the patients family, the patient was being fed when she began choking and had difficulty breathing.     #Diffuse skin involvement with bullae  #Pemphigus vulgaris             #This is an incomplete note, attending recs to follow  87 y/o F w/ PMH/o HTN (as per son at bedside) presenting to the hospital BIBA after experiencing respiratory distress. According to the patients family, the patient was being fed when she began choking and had difficulty breathing.     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    -Continue IV fluid as per the primary team, with attention to sodium correction rate              87 y/o F w/ PMH/o HTN (as per son at bedside) presenting to the hospital BIBA after experiencing respiratory distress. According to the patients family, the patient was being fed when she began choking and had difficulty breathing.     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    -Continue IV fluid as per the primary team, with attention to sodium correction rate     -Usually would start rx with prednisone 60-80 mg daily but would need to be medically stable fo this. Further suggestions pending biopsy

## 2022-02-10 NOTE — PROGRESS NOTE ADULT - SUBJECTIVE AND OBJECTIVE BOX
ARI MA, 88y, Female; MRN# [id number]  Hospital Stay: 1d.    Patient is a 88y old Female who presents with a chief complaint of respiratory failure (09 Feb 2022 00:24)  Currently admitted to the ICU with the primary diagnosis of Acute respiratory failure with hypoxia      SUBJECTIVE:  Interval/Overnight events: No overnight events. Pt on levo @ 0.1, fentanyl @ 2.6 and D5 @ 150.     REVIEW OF SYSTEMS:  As per HPI  OBJECTIVE:  VITALS:  T(F): 96.8 (02-10-22 @ 09:00), Max: 98.4 (02-10-22 @ 00:45)  HR: 62 (02-10-22 @ 10:07) (61 - 94)  BP: 124/52 (02-10-22 @ 10:00) (91/46 - 175/80)  ABP: 118/41 (02-10-22 @ 10:07) (-2/-3 - 119/42)  ABP(mean): 68 (02-10-22 @ 10:07) (-2 - 74)  RR: 16 (02-10-22 @ 10:07) (11 - 23)  SpO2: 100% (02-10-22 @ 10:07) (85% - 100%)    Vent Settings  Device: Avea, Mode: AC/ CMV (Assist Control/ Continuous Mandatory Ventilation), RR (machine): 16, TV (machine): 300, FiO2: 40, PEEP: 5, ITime: 1, MAP: 9, PIP: 24  Blood Gas  02-10-22 @ 03:47  pH 7.31 | pCO2 44 | pO2 172 | HCO3 22 | O2 Sat 100.0  02-09-22 @ 15:49  pH 7.33 | pCO2 43 | pO2 147 | HCO3 23 | O2 Sat 99.8    Drips  dextrose 5%. 1000 milliLiter(s) (150 mL/Hr) IV Continuous <Continuous>  fentaNYL   Infusion. 0.5 MICROgram(s)/kG/Hr (2.25 mL/Hr) IV Continuous <Continuous>  norepinephrine Infusion 0.05 MICROgram(s)/kG/Min (4.22 mL/Hr) IV Continuous <Continuous>    I&Os:    02-09-22 @ 07:01  -  02-10-22 @ 07:00  --------------------------------------------------------  IN: 1620.6 mL / OUT: 385 mL / NET: 1235.6 mL    02-10-22 @ 07:01  -  02-10-22 @ 10:56  --------------------------------------------------------  IN: 620.6 mL / OUT: 70 mL / NET: 550.6 mL      PHYSICAL EXAM:  CONST: elderly female  NECK:  supple, no masses, no significant lymphadenopathy  CARDIAC:  regular rate and rhythm, normal S1 and S2  RESP:  respiratory rate and effort appear normal for age; lungs are clear to auscultation bilaterally  ABDOMEN:  soft, nontender  SKIN:  + diffuse bullae    ACTIVE MEDICATIONS:  Standing  ampicillin/sulbactam  IVPB      ampicillin/sulbactam  IVPB 1.5 Gram(s) IV Intermittent every 12 hours  chlorhexidine 0.12% Liquid 15 milliLiter(s) Oral Mucosa every 12 hours  chlorhexidine 4% Liquid 1 Application(s) Topical <User Schedule>  dextrose 5%. 1000 milliLiter(s) (150 mL/Hr) IV Continuous <Continuous>  fentaNYL   Infusion. 0.5 MICROgram(s)/kG/Hr (2.25 mL/Hr) IV Continuous <Continuous>  heparin   Injectable 5000 Unit(s) SubCutaneous every 12 hours  influenza  Vaccine (HIGH DOSE) 0.7 milliLiter(s) IntraMuscular once  norepinephrine Infusion 0.05 MICROgram(s)/kG/Min (4.22 mL/Hr) IV Continuous <Continuous>  vitamin A &amp; D Ointment 1 Application(s) Topical daily    PRN    LABS:  02-10-22 @ 04:30  WBC 9.69, H/H 7.9<L>/26.3<L>, plt 54<L>  Na 170<HH>, K 3.6, Cl 137<H>, CO2 22, <HH>, Cr 2.6<H>, Glu 209<H>    Ca 7.1<L>, Mg 2.2, Phosphorus --    Tot Protein 4.1<L>, Alb 1.7<L>, T. Bili 0.3, D. Bili --  AST 52<H>, ALT 42<H>, Alk phos 62    02-09-22 @ 17:41  WBC --, H/H --/--, plt --  Na 180<HH>, K 4.1, Cl 144<H>, CO2 22, <HH>, Cr 2.8<H>, Glu 118<H>    Ca 8.1<L>, Mg --, Phosphorus --    Tot Protein --, Alb --, T. Bili --, D. Bili --  AST --, ALT --, Alk phos --    02-09-22 @ 11:54  WBC 12.43<H>, H/H 10.3<L>/33.7<L>, plt 77<L>  Na 176<HH>, K 3.5, Cl 142<H>, CO2 21, <HH>, Cr 2.9<H>, Glu 133<H>    Ca 8.4<L>, Mg 2.5<H>, Phosphorus --    Tot Protein 5.4<L>, Alb 2.3<L>, T. Bili 0.5, D. Bili --  AST 52<H>, ALT 57<H>, Alk phos 71    02-09-22 @ 11:00  WBC 10.95<H>, H/H 10.5<L>/35.6<L>, plt 74<L>  Na 180<HH>, K 3.6, Cl 145<H>, CO2 22, <HH>, Cr 2.9<H>, Glu 123<H>    Ca 8.7, Mg 2.6<H>, Phosphorus --    Tot Protein 5.2<L>, Alb 2.3<L>, T. Bili 0.5, D. Bili --  AST 53<H>, ALT 57<H>, Alk phos 70      02-08-22 @ 23:55:  PT 16.40<H>, INR 1.43<H>, aPTT 35.0          02-10-22 @ 04:30:  Total Chol 83 | Non-HDL 69 | HDL 14<L>  LDL-Direct -- | LDL-Calc 19 | <H>    02-10-22 @ 04:30:  Troponin T 0.13<HH>  02-09-22 @ 17:41:  Troponin T 0.12<HH>  02-08-22 @ 23:55:  Troponin T 0.10<HH>    02-09-22 @ 00:00:  COVID-19 PCR: NotDetec        CULTURES:    Culture - Sputum (collected 02-09-22 @ 18:42)  Source: .Sputum Sputum  Gram Stain (02-10-22 @ 07:17):    Moderate polymorphonuclear leukocytes per low power field    No Squamous epithelial cells per low power field    No organisms seen per oil power field    Culture - Urine (collected 02-09-22 @ 00:00)  Source: Clean Catch Clean Catch (Midstream)  Final Report (02-10-22 @ 06:28):    No growth    Culture - Blood (collected 02-08-22 @ 23:55)  Source: .Blood Blood  Preliminary Report (02-10-22 @ 07:01):    No growth to date.    Culture - Blood (collected 02-08-22 @ 23:55)  Source: .Blood Blood  Preliminary Report (02-10-22 @ 07:01):    No growth to date.      IMAGING:  < from: Xray Chest 1 View- PORTABLE-Routine (Xray Chest 1 View- PORTABLE-Routine in AM.) (02.10.22 @ 06:21) >    Impression:    Right basilar subsegmental opacity. No pneumothorax    < end of copied text >

## 2022-02-10 NOTE — PROGRESS NOTE ADULT - ASSESSMENT
89 y/o F w/ PMH/o HTN (as per son at bedside) presenting to the hospital BIBA after experiencing respiratory distress. According to the patients family, the patient was being fed when she began choking and had difficulty breathing. Pt was bag masked by EMS and transported to the hospital. According to the family, the patient began to experience diffuse bullae across her entire body as well as ulcerations in her mouth for the past month. The family denied noticing fever or chills.  In the ED, patient was tachypneic and hypoxic,  intubated for airway protection. Pt was found to hypothermic, started on warming blanket. Pt started on IV levophed. S/p IV vanc/ cefepime.    IMPRESSION;  Diffuse bullae : resembles Bullous pemphigoid  Not SJS/ EM  Not dermatitis herpetiformis  Could well be drug related ( over the counter of herbal medicine )  Burn evaluation noted  MSOF  JILL > minimal urine output  Aspiration with chemical pneumonitis   No bacterial PNA on CXR  2/8 BCx NG  2/9 UCx NG  2/9 Sputum : moderate PMNs , cultures p    RECOMMENDATIONS;  Burn > diagnostic skin Bx  Derm evaluation  F/u Deep ET cultures  Unasyn 1.5 gm iv q12h  Off loading to prevent pressure sores and preventive measures to avoid aspiration   If any questions , please call [phone] or send a message on [facility] teams  Please update ID in real time with any pertinent new laboratory /microbiological/radiographically findings or a change in the clinical characteristics

## 2022-02-11 LAB
ALBUMIN SERPL ELPH-MCNC: 2 G/DL — LOW (ref 3.5–5.2)
ALP SERPL-CCNC: 69 U/L — SIGNIFICANT CHANGE UP (ref 30–115)
ALT FLD-CCNC: 45 U/L — HIGH (ref 0–41)
ANION GAP SERPL CALC-SCNC: 10 MMOL/L — SIGNIFICANT CHANGE UP (ref 7–14)
ANION GAP SERPL CALC-SCNC: 12 MMOL/L — SIGNIFICANT CHANGE UP (ref 7–14)
ANION GAP SERPL CALC-SCNC: 12 MMOL/L — SIGNIFICANT CHANGE UP (ref 7–14)
ANION GAP SERPL CALC-SCNC: 13 MMOL/L — SIGNIFICANT CHANGE UP (ref 7–14)
ANION GAP SERPL CALC-SCNC: 8 MMOL/L — SIGNIFICANT CHANGE UP (ref 7–14)
AST SERPL-CCNC: 76 U/L — HIGH (ref 0–41)
BASOPHILS # BLD AUTO: 0.01 K/UL — SIGNIFICANT CHANGE UP (ref 0–0.2)
BASOPHILS NFR BLD AUTO: 0.1 % — SIGNIFICANT CHANGE UP (ref 0–1)
BILIRUB SERPL-MCNC: 0.6 MG/DL — SIGNIFICANT CHANGE UP (ref 0.2–1.2)
BUN SERPL-MCNC: 57 MG/DL — HIGH (ref 10–20)
BUN SERPL-MCNC: 64 MG/DL — CRITICAL HIGH (ref 10–20)
BUN SERPL-MCNC: 72 MG/DL — CRITICAL HIGH (ref 10–20)
BUN SERPL-MCNC: 80 MG/DL — CRITICAL HIGH (ref 10–20)
BUN SERPL-MCNC: 82 MG/DL — CRITICAL HIGH (ref 10–20)
CALCIUM SERPL-MCNC: 6.1 MG/DL — LOW (ref 8.5–10.1)
CALCIUM SERPL-MCNC: 6.2 MG/DL — LOW (ref 8.5–10.1)
CALCIUM SERPL-MCNC: 6.4 MG/DL — LOW (ref 8.5–10.1)
CALCIUM SERPL-MCNC: 6.6 MG/DL — LOW (ref 8.5–10.1)
CALCIUM SERPL-MCNC: 6.6 MG/DL — LOW (ref 8.5–10.1)
CHLORIDE SERPL-SCNC: 117 MMOL/L — HIGH (ref 98–110)
CHLORIDE SERPL-SCNC: 122 MMOL/L — HIGH (ref 98–110)
CHLORIDE SERPL-SCNC: 126 MMOL/L — HIGH (ref 98–110)
CHLORIDE SERPL-SCNC: 127 MMOL/L — HIGH (ref 98–110)
CHLORIDE SERPL-SCNC: 130 MMOL/L — HIGH (ref 98–110)
CO2 SERPL-SCNC: 18 MMOL/L — SIGNIFICANT CHANGE UP (ref 17–32)
CO2 SERPL-SCNC: 19 MMOL/L — SIGNIFICANT CHANGE UP (ref 17–32)
CO2 SERPL-SCNC: 19 MMOL/L — SIGNIFICANT CHANGE UP (ref 17–32)
CO2 SERPL-SCNC: 24 MMOL/L — SIGNIFICANT CHANGE UP (ref 17–32)
CO2 SERPL-SCNC: 25 MMOL/L — SIGNIFICANT CHANGE UP (ref 17–32)
CREAT SERPL-MCNC: 1.3 MG/DL — SIGNIFICANT CHANGE UP (ref 0.7–1.5)
CREAT SERPL-MCNC: 1.5 MG/DL — SIGNIFICANT CHANGE UP (ref 0.7–1.5)
CREAT SERPL-MCNC: 1.7 MG/DL — HIGH (ref 0.7–1.5)
CREAT SERPL-MCNC: 1.8 MG/DL — HIGH (ref 0.7–1.5)
CREAT SERPL-MCNC: 1.9 MG/DL — HIGH (ref 0.7–1.5)
EOSINOPHIL # BLD AUTO: 0.15 K/UL — SIGNIFICANT CHANGE UP (ref 0–0.7)
EOSINOPHIL NFR BLD AUTO: 1.4 % — SIGNIFICANT CHANGE UP (ref 0–8)
GLUCOSE BLDC GLUCOMTR-MCNC: 206 MG/DL — HIGH (ref 70–99)
GLUCOSE BLDC GLUCOMTR-MCNC: 53 MG/DL — CRITICAL LOW (ref 70–99)
GLUCOSE SERPL-MCNC: 198 MG/DL — HIGH (ref 70–99)
GLUCOSE SERPL-MCNC: 297 MG/DL — HIGH (ref 70–99)
GLUCOSE SERPL-MCNC: 337 MG/DL — HIGH (ref 70–99)
GLUCOSE SERPL-MCNC: 67 MG/DL — LOW (ref 70–99)
GLUCOSE SERPL-MCNC: 76 MG/DL — SIGNIFICANT CHANGE UP (ref 70–99)
HCT VFR BLD CALC: 27 % — LOW (ref 37–47)
HGB BLD-MCNC: 8.2 G/DL — LOW (ref 12–16)
IMM GRANULOCYTES NFR BLD AUTO: 0.9 % — HIGH (ref 0.1–0.3)
LYMPHOCYTES # BLD AUTO: 0.87 K/UL — LOW (ref 1.2–3.4)
LYMPHOCYTES # BLD AUTO: 8 % — LOW (ref 20.5–51.1)
MAGNESIUM SERPL-MCNC: 1.9 MG/DL — SIGNIFICANT CHANGE UP (ref 1.8–2.4)
MCHC RBC-ENTMCNC: 30.1 PG — SIGNIFICANT CHANGE UP (ref 27–31)
MCHC RBC-ENTMCNC: 30.4 G/DL — LOW (ref 32–37)
MCV RBC AUTO: 99.3 FL — HIGH (ref 81–99)
MONOCYTES # BLD AUTO: 0.48 K/UL — SIGNIFICANT CHANGE UP (ref 0.1–0.6)
MONOCYTES NFR BLD AUTO: 4.4 % — SIGNIFICANT CHANGE UP (ref 1.7–9.3)
MRSA PCR RESULT.: NEGATIVE — SIGNIFICANT CHANGE UP
NEUTROPHILS # BLD AUTO: 9.22 K/UL — HIGH (ref 1.4–6.5)
NEUTROPHILS NFR BLD AUTO: 85.2 % — HIGH (ref 42.2–75.2)
NRBC # BLD: 0 /100 WBCS — SIGNIFICANT CHANGE UP (ref 0–0)
PLATELET # BLD AUTO: 49 K/UL — LOW (ref 130–400)
POTASSIUM SERPL-MCNC: 3.5 MMOL/L — SIGNIFICANT CHANGE UP (ref 3.5–5)
POTASSIUM SERPL-MCNC: 3.8 MMOL/L — SIGNIFICANT CHANGE UP (ref 3.5–5)
POTASSIUM SERPL-MCNC: 4.1 MMOL/L — SIGNIFICANT CHANGE UP (ref 3.5–5)
POTASSIUM SERPL-MCNC: 4.1 MMOL/L — SIGNIFICANT CHANGE UP (ref 3.5–5)
POTASSIUM SERPL-MCNC: 4.2 MMOL/L — SIGNIFICANT CHANGE UP (ref 3.5–5)
POTASSIUM SERPL-SCNC: 3.5 MMOL/L — SIGNIFICANT CHANGE UP (ref 3.5–5)
POTASSIUM SERPL-SCNC: 3.8 MMOL/L — SIGNIFICANT CHANGE UP (ref 3.5–5)
POTASSIUM SERPL-SCNC: 4.1 MMOL/L — SIGNIFICANT CHANGE UP (ref 3.5–5)
POTASSIUM SERPL-SCNC: 4.1 MMOL/L — SIGNIFICANT CHANGE UP (ref 3.5–5)
POTASSIUM SERPL-SCNC: 4.2 MMOL/L — SIGNIFICANT CHANGE UP (ref 3.5–5)
PROT SERPL-MCNC: 4.7 G/DL — LOW (ref 6–8)
RBC # BLD: 2.72 M/UL — LOW (ref 4.2–5.4)
RBC # FLD: 15.1 % — HIGH (ref 11.5–14.5)
SODIUM SERPL-SCNC: 151 MMOL/L — HIGH (ref 135–146)
SODIUM SERPL-SCNC: 155 MMOL/L — HIGH (ref 135–146)
SODIUM SERPL-SCNC: 157 MMOL/L — HIGH (ref 135–146)
SODIUM SERPL-SCNC: 159 MMOL/L — HIGH (ref 135–146)
SODIUM SERPL-SCNC: 160 MMOL/L — HIGH (ref 135–146)
SURGICAL PATHOLOGY STUDY: SIGNIFICANT CHANGE UP
WBC # BLD: 10.83 K/UL — HIGH (ref 4.8–10.8)
WBC # FLD AUTO: 10.83 K/UL — HIGH (ref 4.8–10.8)

## 2022-02-11 PROCEDURE — 71045 X-RAY EXAM CHEST 1 VIEW: CPT | Mod: 26

## 2022-02-11 PROCEDURE — 99233 SBSQ HOSP IP/OBS HIGH 50: CPT

## 2022-02-11 RX ORDER — SODIUM CHLORIDE 9 MG/ML
1000 INJECTION, SOLUTION INTRAVENOUS
Refills: 0 | Status: DISCONTINUED | OUTPATIENT
Start: 2022-02-11 | End: 2022-02-12

## 2022-02-11 RX ORDER — DEXTROSE 10 % IN WATER 10 %
250 INTRAVENOUS SOLUTION INTRAVENOUS ONCE
Refills: 0 | Status: COMPLETED | OUTPATIENT
Start: 2022-02-11 | End: 2022-02-11

## 2022-02-11 RX ADMIN — Medication 1 APPLICATION(S): at 06:23

## 2022-02-11 RX ADMIN — CHLORHEXIDINE GLUCONATE 15 MILLILITER(S): 213 SOLUTION TOPICAL at 06:22

## 2022-02-11 RX ADMIN — HEPARIN SODIUM 5000 UNIT(S): 5000 INJECTION INTRAVENOUS; SUBCUTANEOUS at 17:31

## 2022-02-11 RX ADMIN — FENTANYL CITRATE 2.25 MICROGRAM(S)/KG/HR: 50 INJECTION INTRAVENOUS at 14:57

## 2022-02-11 RX ADMIN — AMPICILLIN SODIUM AND SULBACTAM SODIUM 100 GRAM(S): 250; 125 INJECTION, POWDER, FOR SUSPENSION INTRAMUSCULAR; INTRAVENOUS at 17:31

## 2022-02-11 RX ADMIN — Medication 1 APPLICATION(S): at 11:12

## 2022-02-11 RX ADMIN — CHLORHEXIDINE GLUCONATE 1 APPLICATION(S): 213 SOLUTION TOPICAL at 06:10

## 2022-02-11 RX ADMIN — Medication 60 MILLIGRAM(S): at 22:32

## 2022-02-11 RX ADMIN — Medication 4.22 MICROGRAM(S)/KG/MIN: at 09:52

## 2022-02-11 RX ADMIN — AMPICILLIN SODIUM AND SULBACTAM SODIUM 100 GRAM(S): 250; 125 INJECTION, POWDER, FOR SUSPENSION INTRAMUSCULAR; INTRAVENOUS at 06:10

## 2022-02-11 RX ADMIN — CHLORHEXIDINE GLUCONATE 15 MILLILITER(S): 213 SOLUTION TOPICAL at 18:29

## 2022-02-11 RX ADMIN — SODIUM CHLORIDE 150 MILLILITER(S): 9 INJECTION, SOLUTION INTRAVENOUS at 16:51

## 2022-02-11 RX ADMIN — Medication 4.22 MICROGRAM(S)/KG/MIN: at 17:30

## 2022-02-11 RX ADMIN — Medication 1000 MILLILITER(S): at 06:56

## 2022-02-11 RX ADMIN — SODIUM CHLORIDE 150 MILLILITER(S): 9 INJECTION, SOLUTION INTRAVENOUS at 09:35

## 2022-02-11 RX ADMIN — Medication 1 APPLICATION(S): at 18:30

## 2022-02-11 RX ADMIN — HEPARIN SODIUM 5000 UNIT(S): 5000 INJECTION INTRAVENOUS; SUBCUTANEOUS at 06:10

## 2022-02-11 NOTE — PROGRESS NOTE ADULT - SUBJECTIVE AND OBJECTIVE BOX
Patient is a 88y old  Female who presents with a chief complaint of respiratory failure (09 Feb 2022 00:24)        HPI:  87 y/o F w/ PMH/o HTN (as per son at bedside) presenting to the hospital [facility] after experiencing respiratory distress. According to the patients family, the patient was being fed when she began choking and had difficulty breathing. Pt was bag masked by EMS and transported to the hospital. According to the family, the patient began to experience diffuse skin reaction across her entire body as well as ulcerations in her mouth for the past month. The family denied noticing fever or chills.    In the ED, patient was tachypneic and hypoxic,  intubated for airway protection. Pt was found to hypothermic, started on warming blanket. Pt started on IV levophed. S/p IV vanc/ cefepime. called to evaluate         (09 Feb 2022 04:51)      Pt evaluated on rounds.  I reviewed the radiology tests and hospital record.    I reviewed previous notes on this patient.    Interval Events: No overnight events.      CAM:+    SAT:n    SBT: n      REVIEW OF SYSTEMS:   see HPI      OBJECTIVE:  ICU Vital Signs Last 24 Hrs  T(C): 36.9 (11 Feb 2022 04:00), Max: 37.4 (10 Feb 2022 18:00)  T(F): 98.4 (11 Feb 2022 04:00), Max: 99.3 (10 Feb 2022 18:00)  HR: 101 (11 Feb 2022 04:00) (61 - 103)  BP: 111/58 (11 Feb 2022 04:00) (93/41 - 140/60)  BP(mean): 77 (11 Feb 2022 04:00) (59 - 89)  ABP: 97/38 (11 Feb 2022 04:00) (84/33 - 138/48)  ABP(mean): 60 (11 Feb 2022 04:00) (35 - 80)  RR: 16 (11 Feb 2022 04:00) (14 - 19)  SpO2: 100% (11 Feb 2022 04:00) (90% - 100%)    Mode: AC/ CMV (Assist Control/ Continuous Mandatory Ventilation), RR (machine): 16, TV (machine): 300, FiO2: 40, PEEP: 5, ITime: 1, MAP: 8, PIP: 24    02-09 @ 07:01  -  02-10 @ 07:00  --------------------------------------------------------  IN: 1620.6 mL / OUT: 385 mL / NET: 1235.6 mL    02-10 @ 07:01 - 02-11 @ 06:24  --------------------------------------------------------  IN: 1953.8 mL / OUT: 410 mL / NET: 1543.8 mL      CAPILLARY BLOOD GLUCOSE      POCT Blood Glucose.: 77 mg/dL (10 Feb 2022 21:59)        PHYSICAL EXAM:       · ENMT:   Airway patent,   Nasal mucosa clear.  Mouth with normal mucosa.   No thrush    · EYES:   Clear bilaterally,   pupils equal,   round and reactive to light.    · CARDIAC:   Normal rate,   regular rhythm.    Heart sounds S1, S2.   No murmurs, no rubs or gallops on auscultation  no edema        CAROTID:   normal systolic impulse  no bruits    · RESPIRATORY:   rales  normal chest expansion  no retractions or use of accessory muscles  palpation of chest is normal with no fremitus  percussion of chest demonstrates no hyperresonance or dullness    · GASTROINTESTINAL:  Abdomen soft,   non-tender,   + BS  liver/spleen not palpable    · MUSCULOSKELETAL:   no clubbing, cyanosis      · SKIN:   Skin normal color for race,   warm, dry   No evidence of rash.        · HEME LYMPH:   no splenomegaly.  No cervical  lymphadenopathy.  no inguinal lymphadenopathy    HOSPITAL MEDICATIONS:  MEDICATIONS  (STANDING):  ampicillin/sulbactam  IVPB      ampicillin/sulbactam  IVPB 1.5 Gram(s) IV Intermittent every 12 hours  chlorhexidine 0.12% Liquid 15 milliLiter(s) Oral Mucosa every 12 hours  chlorhexidine 4% Liquid 1 Application(s) Topical <User Schedule>  collagenase Ointment 1 Application(s) Topical two times a day  fentaNYL   Infusion. 0.5 MICROgram(s)/kG/Hr (2.25 mL/Hr) IV Continuous <Continuous>  heparin   Injectable 5000 Unit(s) SubCutaneous every 12 hours  influenza  Vaccine (HIGH DOSE) 0.7 milliLiter(s) IntraMuscular once  norepinephrine Infusion 0.05 MICROgram(s)/kG/Min (4.22 mL/Hr) IV Continuous <Continuous>  sodium chloride 0.45%. 1000 milliLiter(s) (100 mL/Hr) IV Continuous <Continuous>  vitamin A &amp; D Ointment 1 Application(s) Topical daily    MEDICATIONS  (PRN):    sodium chloride 0.45%.: Solution, 1000 milliLiter(s) infuse at 100 mL/Hr  lactated ringers Bolus:   1000 milliLiter(s), IV Bolus, once, infuse over 60 Minute(s), Stop After 1 Doses  lactated ringers.: Solution, 1000 milliLiter(s) infuse at 60 mL/Hr  lactated ringers Bolus:   2000 milliLiter(s), IV Bolus, once, infuse over 60 Minute(s), Stop After 1 Doses  Provider's Contact #: [phone]      LABS:                        8.2    10.83 )-----------( 49       ( 11 Feb 2022 04:32 )             27.0     02-11    159<H>  |  127<H>  |  80<HH>  ----------------------------<  67<L>  4.1   |  19  |  1.8<H>    Ca    6.6<L>      11 Feb 2022 04:32  Mg     1.9     02-11    TPro  4.7<L>  /  Alb  2.0<L>  /  TBili  0.6  /  DBili  x   /  AST  76<H>  /  ALT  45<H>  /  AlkPhos  69  02-11        Arterial Blood Gas:  02-11 @ 03:22  7.29/40/152/19/--/-7.0  ABG lactate: --  Arterial Blood Gas:  02-10 @ 13:30  7.33/37/198/20/100.0/-5.9  ABG lactate: --  Arterial Blood Gas:  02-10 @ 03:47  7.31/44/172/22/100.0/-4.1  ABG lactate: --  Arterial Blood Gas:  02-09 @ 15:49  7.33/43/147/23/99.8/-3.2  ABG lactate: --      Mode: AC/ CMV (Assist Control/ Continuous Mandatory Ventilation), RR (machine): 16, TV (machine): 300, FiO2: 40, PEEP: 5, ITime: 1, MAP: 8, PIP: 24  CARDIAC MARKERS ( 10 Feb 2022 04:30 )  x     / 0.13 ng/mL / x     / x     / x      CARDIAC MARKERS ( 09 Feb 2022 17:41 )  x     / 0.12 ng/mL / x     / x     / x          COVID-19 PCR: NotDetec (09 Feb 2022 00:00)    Mode: AC/ CMV (Assist Control/ Continuous Mandatory Ventilation)  RR (machine): 16  TV (machine): 300  FiO2: 40  PEEP: 5  ITime: 1  MAP: 8  PIP: 24      ABG - ( 11 Feb 2022 03:22 )  pH, Arterial: 7.29  pH, Blood: x     /  pCO2: 40    /  pO2: 152   / HCO3: 19    / Base Excess: -7.0  /  SaO2: x                   RADIOLOGY: Today I personally interpreted the latest CXR and other pertinent films.

## 2022-02-11 NOTE — DIETITIAN INITIAL EVALUATION ADULT. - OTHER CALCULATIONS
Energy: 969 kcal/day ([id number]). Protein: 57-69 g/day (1.5-1.8 g/kg ABW) - in setting of multiple unstageable pressure injuries & critical illness; renal function considered. Will continue to monitor. Fluids: 958-1149 mL/day (25-30 mL/kg ABW) or per LIP

## 2022-02-11 NOTE — DIETITIAN INITIAL EVALUATION ADULT. - PERTINENT MEDS FT
prednisone, levophed at 4.22 mL/hr, heparin, dextrose 5% at 75 mL/hr (306 kcal/day if infused over 24 hour duration)

## 2022-02-11 NOTE — PROGRESS NOTE ADULT - SUBJECTIVE AND OBJECTIVE BOX
Nephrology progress note    THIS IS AN INCOMPLETE NOTE . FULL NOTE TO FOLLOW SHORTLY    Patient is seen and examined, events over the last 24 h noted .    Allergies:  No Known Allergies    Hospital Medications:   MEDICATIONS  (STANDING):  ampicillin/sulbactam  IVPB      ampicillin/sulbactam  IVPB 1.5 Gram(s) IV Intermittent every 12 hours  chlorhexidine 0.12% Liquid 15 milliLiter(s) Oral Mucosa every 12 hours  chlorhexidine 4% Liquid 1 Application(s) Topical <User Schedule>  collagenase Ointment 1 Application(s) Topical two times a day  dextrose 5% 1000 milliLiter(s) (150 mL/Hr) IV Continuous <Continuous>  fentaNYL   Infusion. 0.5 MICROgram(s)/kG/Hr (2.25 mL/Hr) IV Continuous <Continuous>  heparin   Injectable 5000 Unit(s) SubCutaneous every 12 hours  influenza  Vaccine (HIGH DOSE) 0.7 milliLiter(s) IntraMuscular once  norepinephrine Infusion 0.05 MICROgram(s)/kG/Min (4.22 mL/Hr) IV Continuous <Continuous>  vitamin A &amp; D Ointment 1 Application(s) Topical daily        VITALS:  T(F): 97.7 (22 @ 08:00), Max: 99.3 (02-10-22 @ 18:00)  HR: 83 (22 @ 09:27)  BP: 131/61 (22 @ 09:00)  RR: 3 (22 @ 09:27)  SpO2: 100% (22 @ 09:27)  Wt(kg): --    :  -  02-10 @ 07:00  --------------------------------------------------------  IN: 1620.6 mL / OUT: 385 mL / NET: 1235.6 mL    02-10 @ 07:01  -   @ 07:00  --------------------------------------------------------  IN: 3367.8 mL / OUT: 825 mL / NET: 2542.8 mL     @ 07:01  -   @ 09:30  --------------------------------------------------------  IN: 89.8 mL / OUT: 75 mL / NET: 14.8 mL          PHYSICAL EXAM:  Constitutional: NAD  HEENT: anicteric sclera, oropharynx clear, MMM  Neck: No JVD  Respiratory: CTAB, no wheezes, rales or rhonchi  Cardiovascular: S1, S2, RRR  Gastrointestinal: BS+, soft, NT/ND  Extremities: No cyanosis or clubbing. No peripheral edema  :  No nesbitt.   Skin: No rashes    LABS:      159<H>  |  127<H>  |  80<HH>  ----------------------------<  67<L>  4.1   |  19  |  1.8<H>  Creatinine Trend: 1.8<--, 1.9<--, 2.2<--, 2.3<--, 2.6<--, 2.8<--  SODIUM TREND:  Sodium 159 [ @ 04:32]  Sodium 160 [ @ 00:25]  Sodium 160 [02-10 @ 15:40]  Sodium 159 [02-10 @ 11:36]  Sodium 170 [02-10 @ 04:30]  Sodium 180 [ @ 17:41]  Sodium 176 [ @ 11:54]  Sodium 180 [ @ 11:00]  Sodium 173 [ @ 03:30]  Sodium >180 [ @ 23:55]    Ca    6.6<L>      2022 04:32  Mg     1.9         TPro  4.7<L>  /  Alb  2.0<L>  /  TBili  0.6  /  DBili      /  AST  76<H>  /  ALT  45<H>  /  AlkPhos  69                            8.2    10.83 )-----------( 49       ( 2022 04:32 )             27.0       Urine Studies:  Urinalysis Basic - ( 2022 00:00 )    Color: Yellow / Appearance: Slightly Turbid / S.026 / pH:   Gluc:  / Ketone: Trace  / Bili: Negative / Urobili: <2 mg/dL   Blood:  / Protein: 30 mg/dL / Nitrite: Negative   Leuk Esterase: Negative / RBC: 5 /HPF / WBC 3 /HPF   Sq Epi:  / Non Sq Epi: 2 /HPF / Bacteria: Negative      Sodium, Random Urine: 45.0 mmoL/L ( @ 18:52)  Osmolality, Random Urine: 598 mos/kg ( @ 18:52)  Creatinine, Random Urine: 116 mg/dL ( @ 18:52)    RADIOLOGY & ADDITIONAL STUDIES:   Nephrology progress note  Patient is seen and examined, events over the last 24 h noted .  Intubated on MV  has nesbitt in place     Allergies:  No Known Allergies    Hospital Medications:   MEDICATIONS  (STANDING):     ampicillin/sulbactam  IVPB 1.5 Gram(s) IV Intermittent every 12 hours  collagenase Ointment 1 Application(s) Topical two times a day  dextrose 5% 1000 milliLiter(s) (150 mL/Hr) IV Continuous <Continuous>  fentaNYL   Infusion. 0.5 MICROgram(s)/kG/Hr (2.25 mL/Hr) IV Continuous <Continuous>  heparin   Injectable 5000 Unit(s) SubCutaneous every 12 hours  influenza  Vaccine (HIGH DOSE) 0.7 milliLiter(s) IntraMuscular once  norepinephrine Infusion 0.05 MICROgram(s)/kG/Min (4.22 mL/Hr) IV Continuous <Continuous>  vitamin A &amp; D Ointment 1 Application(s) Topical daily        VITALS:  T(F): 97.7 (22 @ 08:00), Max: 99.3 (02-10-22 @ 18:00)  HR: 83 (22 @ 09:27)  BP: 131/61 (22 @ 09:00)  RR: 3 (22 @ 09:27)  SpO2: 100% (22 @ 09:27)       @ 07:  -  02-10 @ 07:00  --------------------------------------------------------  IN: 1620.6 mL / OUT: 385 mL / NET: 1235.6 mL    02-10 @ 07:01  -   @ 07:00  --------------------------------------------------------  IN: 3367.8 mL / OUT: 825 mL / NET: 2542.8 mL     07:01  -   @ 09:30  --------------------------------------------------------  IN: 89.8 mL / OUT: 75 mL / NET: 14.8 mL          PHYSICAL EXAM:  Constitutional: intubated on MV   Neck: No JVD  Respiratory: CTAB,   Cardiovascular: S1, S2, RRR  Gastrointestinal: BS+, soft, NT/ND  Extremities: No cyanosis or clubbing. No peripheral edema  :  nesbitt in place   Skin: No rashes    LABS:      159<H>  |  127<H>  |  80<HH>  ----------------------------<  67<L>  4.1   |  19  |  1.8<H>    Creatinine Trend: 1.8<--, 1.9<--, 2.2<--, 2.3<--, 2.6<--, 2.8<--    SODIUM TREND:  Sodium 159 [ @ 04:32]  Sodium 160 [ @ 00:25]  Sodium 160 [02-10 @ 15:40]  Sodium 159 [02-10 @ 11:36]  Sodium 170 [02-10 @ 04:30]  Sodium 180 [ @ 17:41]  Sodium 176 [ @ 11:54]  Sodium 180 [ @ 11:00]  Sodium 173 [ @ 03:30]  Sodium >180 [ @ 23:55]    Ca    6.6<L>      2022 04:32  Mg     1.9         TPro  4.7<L>  /  Alb  2.0<L>  /  TBili  0.6  /  DBili      /  AST  76<H>  /  ALT  45<H>  /  AlkPhos  69                            8.2    10.83 )-----------( 49       ( 2022 04:32 )             27.0       Urine Studies:  Urinalysis Basic - ( 2022 00:00 )    Color: Yellow / Appearance: Slightly Turbid / S.026 / pH:   Gluc:  / Ketone: Trace  / Bili: Negative / Urobili: <2 mg/dL   Blood:  / Protein: 30 mg/dL / Nitrite: Negative   Leuk Esterase: Negative / RBC: 5 /HPF / WBC 3 /HPF   Sq Epi:  / Non Sq Epi: 2 /HPF / Bacteria: Negative      Sodium, Random Urine: 45.0 mmoL/L ( @ 18:52)  Osmolality, Random Urine: 598 mos/kg ( @ 18:52)  Creatinine, Random Urine: 116 mg/dL ( @ 18:52)    RADIOLOGY & ADDITIONAL STUDIES:

## 2022-02-11 NOTE — PROGRESS NOTE ADULT - ASSESSMENT
IMPRESSION:  Acute Hypoxic Respiratory Failure 2/2 aspiration  Severe hypernatremia  HT1: Mild Hypothermia  Severely reduced kidney function (Possible prerenal JILL, baseline Cr unknown)  HTN? History limited  ? SJS  NAGMA    PLAN:    CNS: fentanyl for sedation,  wean off as tolerated  SAT    HEENT: Oral care    PULMONARY:   no SBT  continue O2 as necessary to maintain sats > 90%<94  HOB @ 45 degrees.    Aspiration precautions.   daily  ABG/ CXR,   no vent changes in Mve    CARDIOVASCULAR:   titrate pressors as tolerated,   goal directed fluids    GI: GI prophylaxis.  Bowel regimen     RENAL:    cont D5W add 1amp HCO3.   Follow up lytes.    Correct as needed. F/u urine lytes/osmol  renal management    INFECTIOUS DISEASE:  abx per ID  Follow up cultures.    HEMATOLOGICAL:  DVT prophylaxis.  f/u skin bx    ENDOCRINE:  Follow up FS. SS. A1c. TSH.    CCT 35 min

## 2022-02-11 NOTE — DIETITIAN INITIAL EVALUATION ADULT. - ORAL INTAKE PTA/DIET HISTORY
Unable to obtain nutrition hx at this time d/t critical illness requiring intubation. Will attempt to obtain at follow-up.

## 2022-02-11 NOTE — PROGRESS NOTE ADULT - SUBJECTIVE AND OBJECTIVE BOX
ARI MA  88y, Female    All available historical data reviewed    OVERNIGHT EVENTS:  no fevers  Fio2 40%  on pressors    ROS:  unable to obtain history secondary to patient's mental status and/or sedation     VITALS:  T(F): 98.4, Max: 99.3 (02-10-22 @ 18:00)  HR: 91  BP: 137/60  RR: 16Vital Signs Last 24 Hrs  T(C): 36.9 (11 Feb 2022 04:00), Max: 37.4 (10 Feb 2022 18:00)  T(F): 98.4 (11 Feb 2022 04:00), Max: 99.3 (10 Feb 2022 18:00)  HR: 91 (11 Feb 2022 07:30) (61 - 103)  BP: 137/60 (11 Feb 2022 07:30) (104/56 - 140/60)  BP(mean): 87 (11 Feb 2022 07:30) (68 - 89)  RR: 16 (11 Feb 2022 07:30) (14 - 17)  SpO2: 100% (11 Feb 2022 07:00) (90% - 100%)    TESTS & MEASUREMENTS:                        8.2    10.83 )-----------( 49       ( 11 Feb 2022 04:32 )             27.0     02-11    159<H>  |  127<H>  |  80<HH>  ----------------------------<  67<L>  4.1   |  19  |  1.8<H>    Ca    6.6<L>      11 Feb 2022 04:32  Mg     1.9     02-11    TPro  4.7<L>  /  Alb  2.0<L>  /  TBili  0.6  /  DBili  x   /  AST  76<H>  /  ALT  45<H>  /  AlkPhos  69  02-11    LIVER FUNCTIONS - ( 11 Feb 2022 04:32 )  Alb: 2.0 g/dL / Pro: 4.7 g/dL / ALK PHOS: 69 U/L / ALT: 45 U/L / AST: 76 U/L / GGT: x             Culture - Sputum (collected 02-09-22 @ 18:42)  Source: .Sputum Sputum  Gram Stain (02-10-22 @ 07:17):    Moderate polymorphonuclear leukocytes per low power field    No Squamous epithelial cells per low power field    No organisms seen per oil power field  Preliminary Report (02-10-22 @ 18:33):    Culture in progress    Culture - Urine (collected 02-09-22 @ 00:00)  Source: Clean Catch Clean Catch (Midstream)  Final Report (02-10-22 @ 06:28):    No growth    Culture - Blood (collected 02-08-22 @ 23:55)  Source: .Blood Blood  Preliminary Report (02-10-22 @ 07:01):    No growth to date.    Culture - Blood (collected 02-08-22 @ 23:55)  Source: .Blood Blood  Preliminary Report (02-10-22 @ 07:01):    No growth to date.            RADIOLOGY & ADDITIONAL TESTS:  Personal review of radiological diagnostics performed  Echo and EKG results noted when applicable.     MEDICATIONS:  ampicillin/sulbactam  IVPB      ampicillin/sulbactam  IVPB 1.5 Gram(s) IV Intermittent every 12 hours  chlorhexidine 0.12% Liquid 15 milliLiter(s) Oral Mucosa every 12 hours  chlorhexidine 4% Liquid 1 Application(s) Topical <User Schedule>  collagenase Ointment 1 Application(s) Topical two times a day  dextrose 5% 1000 milliLiter(s) IV Continuous <Continuous>  fentaNYL   Infusion. 0.5 MICROgram(s)/kG/Hr IV Continuous <Continuous>  heparin   Injectable 5000 Unit(s) SubCutaneous every 12 hours  influenza  Vaccine (HIGH DOSE) 0.7 milliLiter(s) IntraMuscular once  norepinephrine Infusion 0.05 MICROgram(s)/kG/Min IV Continuous <Continuous>  vitamin A &amp; D Ointment 1 Application(s) Topical daily      ANTIBIOTICS:  ampicillin/sulbactam  IVPB      ampicillin/sulbactam  IVPB 1.5 Gram(s) IV Intermittent every 12 hours

## 2022-02-11 NOTE — PROGRESS NOTE ADULT - ASSESSMENT
89 y/o F w/ PMH/o HTN (as per son at bedside) presenting to the hospital BIB after experiencing respiratory distress. According to the patients family, the patient was being fed when she began choking and had difficulty breathing. Pt was bag masked by EMS and transported to the hospital. According to the family, the patient began to experience diffuse bullae across her entire body as well as ulcerations in her mouth for the past month. The family denied noticing fever or chills.  In the ED, patient was tachypneic and hypoxic,  intubated for airway protection. Pt was found to hypothermic, started on warming blanket. Pt started on IV levophed. S/p IV vanc/ cefepime.    IMPRESSION;  Diffuse bullae : s/p Bx 2/9  MSOF  JILL > minimal urine output  Aspiration with chemical pneumonitis   No bacterial PNA on CXR  2/8 BCx NG  2/9 UCx NG  2/9 Sputum : moderate PMNs , cultures p    RECOMMENDATIONS;  F/u  skin Bx  F/u Deep ET cultures  Unasyn 1.5 gm iv q12h  Off loading to prevent pressure sores and preventive measures to avoid aspiration   If any questions , please call [phone] or send a message on [employer] teams  Please update ID in real time with any pertinent new laboratory /microbiological/radiographically findings or a change in the clinical characteristics

## 2022-02-11 NOTE — DIETITIAN INITIAL EVALUATION ADULT. - PERTINENT LABORATORY DATA
2/11: RBC-2.72, H/H-8.2/27.0, Na-151, K-3.5 (WDL), Cl-117, BUN-57, gluc-337, corrected Ca-7.7, Mg-1.9; 2/10: ZnpQ5P-9.9%, cholesterol-83 (WDL), triglycerides-203, HDL-14, LDL-19 (WDL)

## 2022-02-11 NOTE — PROGRESS NOTE ADULT - ASSESSMENT
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  - Unasyn as per ID  - F/u biopsy by Burn  - Derm following      DVT ppx: HSQ  Right radial A line 2/9

## 2022-02-11 NOTE — PROGRESS NOTE ADULT - ASSESSMENT
88y Female with PMH HTN presenting to the hospital BIBA after experiencing respiratory distress. Nephrology consulted for sodium >180 and evidence of JILL in setting of unknown baseline kidney function.     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

## 2022-02-11 NOTE — PROGRESS NOTE ADULT - SUBJECTIVE AND OBJECTIVE BOX
ARI MA, 88y, Female; MRN# [id number]  Hospital Stay: 2d.    Patient is a 88y old Female who presents with a chief complaint of respiratory failure (09 Feb 2022 00:24)  Currently admitted to the ICU with the primary diagnosis of Acute respiratory failure with hypoxia      SUBJECTIVE:  Interval/Overnight events: No overnight events. Pt on levo @0.38, fentanyl @3.5 and 1/2NS @50    REVIEW OF SYSTEMS:  As per HPI  OBJECTIVE:  VITALS:  T(F): 97.7 (02-11-22 @ 10:00), Max: 99.3 (02-10-22 @ 18:00)  HR: 83 (02-11-22 @ 10:00) (81 - 103)  BP: 131/61 (02-11-22 @ 09:00) (104/56 - 140/60)  ABP: 118/38 (02-11-22 @ 10:00) (84/33 - 146/46)  ABP(mean): 65 (02-11-22 @ 10:00) (51 - 82)  RR: 16 (02-11-22 @ 10:00) (3 - 16)  SpO2: 100% (02-11-22 @ 10:00) (90% - 100%)    Vent Settings  Device: Avea,[id number], Mode: AC/ CMV (Assist Control/ Continuous Mandatory Ventilation), RR (machine): 16, TV (machine): 300, FiO2: 35, PEEP: 5, ITime: 1, MAP: 9, PIP: 24  Blood Gas  02-11-22 @ 03:22  pH 7.29 | pCO2 40 | pO2 152 | HCO3 19 | O2 Sat --  02-10-22 @ 13:30  pH 7.33 | pCO2 37 | pO2 198 | HCO3 20 | O2 Sat 100.0    Drips  dextrose 5% 1000 milliLiter(s) (150 mL/Hr) IV Continuous <Continuous>  fentaNYL   Infusion. 0.5 MICROgram(s)/kG/Hr (2.25 mL/Hr) IV Continuous <Continuous>  norepinephrine Infusion 0.05 MICROgram(s)/kG/Min (4.22 mL/Hr) IV Continuous <Continuous>    I&Os:    02-10-22 @ 07:01  -  02-11-22 @ 07:00  --------------------------------------------------------  IN: 3367.8 mL / OUT: 825 mL / NET: 2542.8 mL    02-11-22 @ 07:01  -  02-11-22 @ 10:45  --------------------------------------------------------  IN: 285.2 mL / OUT: 125 mL / NET: 160.2 mL      PHYSICAL EXAM:  CONST: thin elderly female  NECK:  supple  CARDIAC:  regular rate and rhythm, normal S1 and S2  RESP:  respiratory rate and effort appear normal for age; lungs are clear to auscultation bilaterally;  ABDOMEN:  soft, nontender  MUSCULOSKELETAL/NEURO:  normal movement, normal tone  SKIN: + diffuse bullae with wound dressing    ACTIVE MEDICATIONS:  Standing  ampicillin/sulbactam  IVPB      ampicillin/sulbactam  IVPB 1.5 Gram(s) IV Intermittent every 12 hours  chlorhexidine 0.12% Liquid 15 milliLiter(s) Oral Mucosa every 12 hours  chlorhexidine 4% Liquid 1 Application(s) Topical <User Schedule>  collagenase Ointment 1 Application(s) Topical two times a day  dextrose 5% 1000 milliLiter(s) (150 mL/Hr) IV Continuous <Continuous>  fentaNYL   Infusion. 0.5 MICROgram(s)/kG/Hr (2.25 mL/Hr) IV Continuous <Continuous>  heparin   Injectable 5000 Unit(s) SubCutaneous every 12 hours  influenza  Vaccine (HIGH DOSE) 0.7 milliLiter(s) IntraMuscular once  norepinephrine Infusion 0.05 MICROgram(s)/kG/Min (4.22 mL/Hr) IV Continuous <Continuous>  vitamin A &amp; D Ointment 1 Application(s) Topical daily    PRN    LABS:  02-11-22 @ 04:32  WBC 10.83<H>, H/H 8.2<L>/27.0<L>, plt 49<L>  Na 159<H>, K 4.1, Cl 127<H>, CO2 19, BUN 80<HH>, Cr 1.8<H>, Glu 67<L>    Ca 6.6<L>, Mg 1.9, Phosphorus --    Tot Protein 4.7<L>, Alb 2.0<L>, T. Bili 0.6, D. Bili --  AST 76<H>, ALT 45<H>, Alk phos 69    02-11-22 @ 00:25  WBC --, H/H --/--, plt --  Na 160<H>, K 4.2, Cl 130<H>, CO2 18, BUN 82<HH>, Cr 1.9<H>, Glu 76    Ca 6.6<L>, Mg --, Phosphorus --    Tot Protein --, Alb --, T. Bili --, D. Bili --  AST --, ALT --, Alk phos --    02-10-22 @ 15:40  WBC --, H/H --/--, plt --  Na 160<H>, K 4.3, Cl 129<H>, CO2 21, BUN 93<HH>, Cr 2.2<H>, Glu 131<H>    Ca 6.9<L>, Mg --, Phosphorus --    Tot Protein 4.8<L>, Alb 1.9<L>, T. Bili 0.5, D. Bili --  AST 71<H>, ALT 48<H>, Alk phos 69    02-10-22 @ 11:36  WBC 10.97<H>, H/H 9.3<L>/31.0<L>, plt 52<L>  Na 159<H>, K 4.6, Cl 128<H>, CO2 21, BUN 98<HH>, Cr 2.3<H>, Glu 199<H>    Ca 7.0<L>, Mg --, Phosphorus --    Tot Protein 4.9<L>, Alb 2.2<L>, T. Bili 0.5, D. Bili --  AST 69<H>, ALT 50<H>, Alk phos 70          02-10-22 @ 04:30:  TSH 1.39      02-10-22 @ 04:30:  Hgb A1c 6.9<H>% | Mean plasma glucose 151<H>    02-10-22 @ 04:30:  Total Chol 83 | Non-HDL 69 | HDL 14<L>  LDL-Direct -- | LDL-Calc 19 | <H>    02-10-22 @ 04:30:  Troponin T 0.13<HH>  02-09-22 @ 17:41:  Troponin T 0.12<HH>  02-08-22 @ 23:55:  Troponin T 0.10<HH>    02-09-22 @ 00:00:  COVID-19 PCR: NotDetec    CULTURES:    Culture - Sputum (collected 02-09-22 @ 18:42)  Source: .Sputum Sputum  Gram Stain (02-10-22 @ 07:17):    Moderate polymorphonuclear leukocytes per low power field    No Squamous epithelial cells per low power field    No organisms seen per oil power field  Preliminary Report (02-10-22 @ 18:33):    Culture in progress    Culture - Urine (collected 02-09-22 @ 00:00)  Source: Clean Catch Clean Catch (Midstream)  Final Report (02-10-22 @ 06:28):    No growth    Culture - Blood (collected 02-08-22 @ 23:55)  Source: .Blood Blood  Preliminary Report (02-10-22 @ 07:01):    No growth to date.    Culture - Blood (collected 02-08-22 @ 23:55)  Source: .Blood Blood  Preliminary Report (02-10-22 @ 07:01):    No growth to date.    IMAGING:  < from: Xray Chest 1 View- PORTABLE-Routine (Xray Chest 1 View- PORTABLE-Routine in AM.) (02.11.22 @ 05:56) >  Impression:  Bibasilar opacities. No definite pneumothorax. Please note that the left   apex is obscured and cannot be evaluated.    Lines and support devices as described above. There is an endotracheal   tube. Exact positioning of the tip of the endotracheal tube is limited   due to positioning and rotation of patient.    < end of copied text >

## 2022-02-11 NOTE — DIETITIAN INITIAL EVALUATION ADULT. - ADD RECOMMEND
Recommendation: Change EN formula to continuous infusions of Peptamen AF initiated at 10 mL/hr. Increase by 10 mL as tolerated q6-8hrs to goal rate 20 mL/hr + order 3 packets Prosource TF daily. Regimen at goal to provide 696 kcal, 69 g protein, 389 mL free H2O. Provide 75 mL pre & post flushes q6hrs. Monitor Mg/PO4/K prior to & throughout initiation of EN regimen; replete as needed. Current D5W rate to add 306 kcal/day if infused over 24 hour duration. Order Nepro-marva q24hrs. Check plasma zinc level.

## 2022-02-12 LAB
-  AMIKACIN: SIGNIFICANT CHANGE UP
-  AZTREONAM: SIGNIFICANT CHANGE UP
-  CEFEPIME: SIGNIFICANT CHANGE UP
-  CEFTAZIDIME: SIGNIFICANT CHANGE UP
-  CIPROFLOXACIN: SIGNIFICANT CHANGE UP
-  GENTAMICIN: SIGNIFICANT CHANGE UP
-  IMIPENEM: SIGNIFICANT CHANGE UP
-  LEVOFLOXACIN: SIGNIFICANT CHANGE UP
-  MEROPENEM: SIGNIFICANT CHANGE UP
-  PIPERACILLIN/TAZOBACTAM: SIGNIFICANT CHANGE UP
-  TOBRAMYCIN: SIGNIFICANT CHANGE UP
ALBUMIN SERPL ELPH-MCNC: 1.7 G/DL — LOW (ref 3.5–5.2)
ALBUMIN SERPL ELPH-MCNC: 1.8 G/DL — LOW (ref 3.5–5.2)
ALP SERPL-CCNC: 111 U/L — SIGNIFICANT CHANGE UP (ref 30–115)
ALP SERPL-CCNC: 75 U/L — SIGNIFICANT CHANGE UP (ref 30–115)
ALT FLD-CCNC: 51 U/L — HIGH (ref 0–41)
ALT FLD-CCNC: 71 U/L — HIGH (ref 0–41)
ANION GAP SERPL CALC-SCNC: 10 MMOL/L — SIGNIFICANT CHANGE UP (ref 7–14)
ANION GAP SERPL CALC-SCNC: 11 MMOL/L — SIGNIFICANT CHANGE UP (ref 7–14)
ANION GAP SERPL CALC-SCNC: 12 MMOL/L — SIGNIFICANT CHANGE UP (ref 7–14)
AST SERPL-CCNC: 119 U/L — HIGH (ref 0–41)
AST SERPL-CCNC: 177 U/L — HIGH (ref 0–41)
BASOPHILS # BLD AUTO: 0.01 K/UL — SIGNIFICANT CHANGE UP (ref 0–0.2)
BASOPHILS # BLD AUTO: 0.02 K/UL — SIGNIFICANT CHANGE UP (ref 0–0.2)
BASOPHILS NFR BLD AUTO: 0.1 % — SIGNIFICANT CHANGE UP (ref 0–1)
BASOPHILS NFR BLD AUTO: 0.1 % — SIGNIFICANT CHANGE UP (ref 0–1)
BILIRUB SERPL-MCNC: 0.6 MG/DL — SIGNIFICANT CHANGE UP (ref 0.2–1.2)
BILIRUB SERPL-MCNC: 1.2 MG/DL — SIGNIFICANT CHANGE UP (ref 0.2–1.2)
BUN SERPL-MCNC: 38 MG/DL — HIGH (ref 10–20)
BUN SERPL-MCNC: 41 MG/DL — HIGH (ref 10–20)
BUN SERPL-MCNC: 45 MG/DL — HIGH (ref 10–20)
CALCIUM SERPL-MCNC: 5.9 MG/DL — CRITICAL LOW (ref 8.5–10.1)
CALCIUM SERPL-MCNC: 6.1 MG/DL — LOW (ref 8.5–10.1)
CALCIUM SERPL-MCNC: 6.2 MG/DL — LOW (ref 8.5–10.1)
CHLORIDE SERPL-SCNC: 107 MMOL/L — SIGNIFICANT CHANGE UP (ref 98–110)
CHLORIDE SERPL-SCNC: 113 MMOL/L — HIGH (ref 98–110)
CHLORIDE SERPL-SCNC: 116 MMOL/L — HIGH (ref 98–110)
CO2 SERPL-SCNC: 25 MMOL/L — SIGNIFICANT CHANGE UP (ref 17–32)
CO2 SERPL-SCNC: 25 MMOL/L — SIGNIFICANT CHANGE UP (ref 17–32)
CO2 SERPL-SCNC: 26 MMOL/L — SIGNIFICANT CHANGE UP (ref 17–32)
CREAT SERPL-MCNC: 0.9 MG/DL — SIGNIFICANT CHANGE UP (ref 0.7–1.5)
CREAT SERPL-MCNC: 1 MG/DL — SIGNIFICANT CHANGE UP (ref 0.7–1.5)
CREAT SERPL-MCNC: 1.1 MG/DL — SIGNIFICANT CHANGE UP (ref 0.7–1.5)
CULTURE RESULTS: SIGNIFICANT CHANGE UP
EOSINOPHIL # BLD AUTO: 0 K/UL — SIGNIFICANT CHANGE UP (ref 0–0.7)
EOSINOPHIL # BLD AUTO: 0 K/UL — SIGNIFICANT CHANGE UP (ref 0–0.7)
EOSINOPHIL NFR BLD AUTO: 0 % — SIGNIFICANT CHANGE UP (ref 0–8)
EOSINOPHIL NFR BLD AUTO: 0 % — SIGNIFICANT CHANGE UP (ref 0–8)
GAS PNL BLDA: SIGNIFICANT CHANGE UP
GLUCOSE BLDC GLUCOMTR-MCNC: 107 MG/DL — HIGH (ref 70–99)
GLUCOSE BLDC GLUCOMTR-MCNC: 119 MG/DL — HIGH (ref 70–99)
GLUCOSE BLDC GLUCOMTR-MCNC: 131 MG/DL — HIGH (ref 70–99)
GLUCOSE BLDC GLUCOMTR-MCNC: 189 MG/DL — HIGH (ref 70–99)
GLUCOSE BLDC GLUCOMTR-MCNC: 217 MG/DL — HIGH (ref 70–99)
GLUCOSE BLDC GLUCOMTR-MCNC: 239 MG/DL — HIGH (ref 70–99)
GLUCOSE BLDC GLUCOMTR-MCNC: 278 MG/DL — HIGH (ref 70–99)
GLUCOSE BLDC GLUCOMTR-MCNC: 285 MG/DL — HIGH (ref 70–99)
GLUCOSE BLDC GLUCOMTR-MCNC: 290 MG/DL — HIGH (ref 70–99)
GLUCOSE BLDC GLUCOMTR-MCNC: 317 MG/DL — HIGH (ref 70–99)
GLUCOSE BLDC GLUCOMTR-MCNC: 367 MG/DL — HIGH (ref 70–99)
GLUCOSE BLDC GLUCOMTR-MCNC: 77 MG/DL — SIGNIFICANT CHANGE UP (ref 70–99)
GLUCOSE BLDC GLUCOMTR-MCNC: 79 MG/DL — SIGNIFICANT CHANGE UP (ref 70–99)
GLUCOSE BLDC GLUCOMTR-MCNC: 86 MG/DL — SIGNIFICANT CHANGE UP (ref 70–99)
GLUCOSE SERPL-MCNC: 226 MG/DL — HIGH (ref 70–99)
GLUCOSE SERPL-MCNC: 276 MG/DL — HIGH (ref 70–99)
GLUCOSE SERPL-MCNC: 85 MG/DL — SIGNIFICANT CHANGE UP (ref 70–99)
HCT VFR BLD CALC: 21.9 % — LOW (ref 37–47)
HCT VFR BLD CALC: 32.9 % — LOW (ref 37–47)
HGB BLD-MCNC: 11 G/DL — LOW (ref 12–16)
HGB BLD-MCNC: 6.9 G/DL — CRITICAL LOW (ref 12–16)
IMM GRANULOCYTES NFR BLD AUTO: 0.4 % — HIGH (ref 0.1–0.3)
IMM GRANULOCYTES NFR BLD AUTO: 0.6 % — HIGH (ref 0.1–0.3)
LYMPHOCYTES # BLD AUTO: 0.26 K/UL — LOW (ref 1.2–3.4)
LYMPHOCYTES # BLD AUTO: 0.27 K/UL — LOW (ref 1.2–3.4)
LYMPHOCYTES # BLD AUTO: 1.7 % — LOW (ref 20.5–51.1)
LYMPHOCYTES # BLD AUTO: 3.1 % — LOW (ref 20.5–51.1)
MAGNESIUM SERPL-MCNC: 1.7 MG/DL — LOW (ref 1.8–2.4)
MAGNESIUM SERPL-MCNC: 2.1 MG/DL — SIGNIFICANT CHANGE UP (ref 1.8–2.4)
MCHC RBC-ENTMCNC: 29.7 PG — SIGNIFICANT CHANGE UP (ref 27–31)
MCHC RBC-ENTMCNC: 29.9 PG — SIGNIFICANT CHANGE UP (ref 27–31)
MCHC RBC-ENTMCNC: 31.5 G/DL — LOW (ref 32–37)
MCHC RBC-ENTMCNC: 33.4 G/DL — SIGNIFICANT CHANGE UP (ref 32–37)
MCV RBC AUTO: 88.9 FL — SIGNIFICANT CHANGE UP (ref 81–99)
MCV RBC AUTO: 94.8 FL — SIGNIFICANT CHANGE UP (ref 81–99)
METHOD TYPE: SIGNIFICANT CHANGE UP
MONOCYTES # BLD AUTO: 0.09 K/UL — LOW (ref 0.1–0.6)
MONOCYTES # BLD AUTO: 0.49 K/UL — SIGNIFICANT CHANGE UP (ref 0.1–0.6)
MONOCYTES NFR BLD AUTO: 1.1 % — LOW (ref 1.7–9.3)
MONOCYTES NFR BLD AUTO: 3 % — SIGNIFICANT CHANGE UP (ref 1.7–9.3)
NEUTROPHILS # BLD AUTO: 15.22 K/UL — HIGH (ref 1.4–6.5)
NEUTROPHILS # BLD AUTO: 7.87 K/UL — HIGH (ref 1.4–6.5)
NEUTROPHILS NFR BLD AUTO: 94.6 % — HIGH (ref 42.2–75.2)
NEUTROPHILS NFR BLD AUTO: 95.3 % — HIGH (ref 42.2–75.2)
NRBC # BLD: 0 /100 WBCS — SIGNIFICANT CHANGE UP (ref 0–0)
NRBC # BLD: 0 /100 WBCS — SIGNIFICANT CHANGE UP (ref 0–0)
ORGANISM # SPEC MICROSCOPIC CNT: SIGNIFICANT CHANGE UP
ORGANISM # SPEC MICROSCOPIC CNT: SIGNIFICANT CHANGE UP
PLATELET # BLD AUTO: 40 K/UL — LOW (ref 130–400)
PLATELET # BLD AUTO: 44 K/UL — LOW (ref 130–400)
POTASSIUM SERPL-MCNC: 3.7 MMOL/L — SIGNIFICANT CHANGE UP (ref 3.5–5)
POTASSIUM SERPL-MCNC: 3.8 MMOL/L — SIGNIFICANT CHANGE UP (ref 3.5–5)
POTASSIUM SERPL-MCNC: 3.9 MMOL/L — SIGNIFICANT CHANGE UP (ref 3.5–5)
POTASSIUM SERPL-SCNC: 3.7 MMOL/L — SIGNIFICANT CHANGE UP (ref 3.5–5)
POTASSIUM SERPL-SCNC: 3.8 MMOL/L — SIGNIFICANT CHANGE UP (ref 3.5–5)
POTASSIUM SERPL-SCNC: 3.9 MMOL/L — SIGNIFICANT CHANGE UP (ref 3.5–5)
PROT SERPL-MCNC: 4 G/DL — LOW (ref 6–8)
PROT SERPL-MCNC: 4.7 G/DL — LOW (ref 6–8)
RBC # BLD: 2.31 M/UL — LOW (ref 4.2–5.4)
RBC # BLD: 3.7 M/UL — LOW (ref 4.2–5.4)
RBC # FLD: 14.5 % — SIGNIFICANT CHANGE UP (ref 11.5–14.5)
RBC # FLD: 15.8 % — HIGH (ref 11.5–14.5)
SODIUM SERPL-SCNC: 144 MMOL/L — SIGNIFICANT CHANGE UP (ref 135–146)
SODIUM SERPL-SCNC: 149 MMOL/L — HIGH (ref 135–146)
SODIUM SERPL-SCNC: 152 MMOL/L — HIGH (ref 135–146)
SPECIMEN SOURCE: SIGNIFICANT CHANGE UP
WBC # BLD: 16.09 K/UL — HIGH (ref 4.8–10.8)
WBC # BLD: 8.26 K/UL — SIGNIFICANT CHANGE UP (ref 4.8–10.8)
WBC # FLD AUTO: 16.09 K/UL — HIGH (ref 4.8–10.8)
WBC # FLD AUTO: 8.26 K/UL — SIGNIFICANT CHANGE UP (ref 4.8–10.8)

## 2022-02-12 PROCEDURE — 71045 X-RAY EXAM CHEST 1 VIEW: CPT | Mod: 26

## 2022-02-12 PROCEDURE — 99233 SBSQ HOSP IP/OBS HIGH 50: CPT

## 2022-02-12 RX ORDER — POTASSIUM CHLORIDE 20 MEQ
20 PACKET (EA) ORAL ONCE
Refills: 0 | Status: COMPLETED | OUTPATIENT
Start: 2022-02-12 | End: 2022-02-12

## 2022-02-12 RX ORDER — SODIUM CHLORIDE 9 MG/ML
1000 INJECTION, SOLUTION INTRAVENOUS
Refills: 0 | Status: DISCONTINUED | OUTPATIENT
Start: 2022-02-12 | End: 2022-02-12

## 2022-02-12 RX ORDER — INSULIN LISPRO 100/ML
5 VIAL (ML) SUBCUTANEOUS ONCE
Refills: 0 | Status: COMPLETED | OUTPATIENT
Start: 2022-02-12 | End: 2022-02-12

## 2022-02-12 RX ORDER — SODIUM CHLORIDE 9 MG/ML
1000 INJECTION, SOLUTION INTRAVENOUS
Refills: 0 | Status: DISCONTINUED | OUTPATIENT
Start: 2022-02-12 | End: 2022-02-13

## 2022-02-12 RX ORDER — MAGNESIUM SULFATE 500 MG/ML
2 VIAL (ML) INJECTION ONCE
Refills: 0 | Status: COMPLETED | OUTPATIENT
Start: 2022-02-12 | End: 2022-02-12

## 2022-02-12 RX ORDER — INSULIN HUMAN 100 [IU]/ML
1 INJECTION, SOLUTION SUBCUTANEOUS
Qty: 100 | Refills: 0 | Status: DISCONTINUED | OUTPATIENT
Start: 2022-02-12 | End: 2022-02-15

## 2022-02-12 RX ADMIN — Medication 25 GRAM(S): at 12:00

## 2022-02-12 RX ADMIN — AMPICILLIN SODIUM AND SULBACTAM SODIUM 100 GRAM(S): 250; 125 INJECTION, POWDER, FOR SUSPENSION INTRAMUSCULAR; INTRAVENOUS at 17:41

## 2022-02-12 RX ADMIN — HEPARIN SODIUM 5000 UNIT(S): 5000 INJECTION INTRAVENOUS; SUBCUTANEOUS at 05:24

## 2022-02-12 RX ADMIN — Medication 100 MILLIGRAM(S): at 17:42

## 2022-02-12 RX ADMIN — Medication 4.22 MICROGRAM(S)/KG/MIN: at 05:24

## 2022-02-12 RX ADMIN — CHLORHEXIDINE GLUCONATE 15 MILLILITER(S): 213 SOLUTION TOPICAL at 05:25

## 2022-02-12 RX ADMIN — Medication 5 UNIT(S): at 04:38

## 2022-02-12 RX ADMIN — Medication 1 APPLICATION(S): at 11:12

## 2022-02-12 RX ADMIN — HEPARIN SODIUM 5000 UNIT(S): 5000 INJECTION INTRAVENOUS; SUBCUTANEOUS at 17:42

## 2022-02-12 RX ADMIN — Medication 1 APPLICATION(S): at 05:32

## 2022-02-12 RX ADMIN — INSULIN HUMAN 1 UNIT(S)/HR: 100 INJECTION, SOLUTION SUBCUTANEOUS at 09:11

## 2022-02-12 RX ADMIN — SODIUM CHLORIDE 75 MILLILITER(S): 9 INJECTION, SOLUTION INTRAVENOUS at 09:00

## 2022-02-12 RX ADMIN — CHLORHEXIDINE GLUCONATE 1 APPLICATION(S): 213 SOLUTION TOPICAL at 05:24

## 2022-02-12 RX ADMIN — Medication 50 MILLIEQUIVALENT(S): at 18:49

## 2022-02-12 RX ADMIN — Medication 1 APPLICATION(S): at 17:36

## 2022-02-12 RX ADMIN — AMPICILLIN SODIUM AND SULBACTAM SODIUM 100 GRAM(S): 250; 125 INJECTION, POWDER, FOR SUSPENSION INTRAMUSCULAR; INTRAVENOUS at 05:32

## 2022-02-12 RX ADMIN — CHLORHEXIDINE GLUCONATE 15 MILLILITER(S): 213 SOLUTION TOPICAL at 17:41

## 2022-02-12 RX ADMIN — Medication 60 MILLIGRAM(S): at 05:25

## 2022-02-12 RX ADMIN — Medication 5 UNIT(S): at 04:37

## 2022-02-12 NOTE — PROGRESS NOTE ADULT - ASSESSMENT
IMPRESSION:  Acute Hypoxic Respiratory Failure 2/2 aspiration  Severe hypernatremia  HT1: Mild Hypothermia  Severely reduced kidney function (Possible prerenal JILL, baseline Cr unknown)  HTN? History limited  bullous pemphigoid  NAGMA    PLAN:    CNS: fentanyl for sedation,  wean off as tolerated  SAT    HEENT: Oral care    PULMONARY:   SBT today  continue O2 as necessary to maintain sats > 90%<94  HOB @ 45 degrees.    Aspiration precautions.   daily  ABG/ CXR,   no vent changes in Mve    CARDIOVASCULAR:   off pressors   goal directed fluids    GI: GI prophylaxis.  Bowel regimen     RENAL:    cont D5W add 1amp HCO3.   Follow up lytes.    Correct as needed. F/u urine lytes/osmol  renal management    INFECTIOUS DISEASE:  abx per ID  Follow up cultures.    HEMATOLOGICAL:  DVT prophylaxis.  steroids for pemphigoid    ENDOCRINE:  Follow up FS. SS. A1c. .     IMPRESSION:  Acute Hypoxic Respiratory Failure 2/2 aspiration  Severe hypernatremia  HT1: Mild Hypothermia  Severely reduced kidney function (Possible prerenal JILL, baseline Cr unknown)  HTN? History limited  bullous pemphigoid  NAGMA    PLAN:    CNS: fentanyl for sedation,  wean off as tolerated  SAT    HEENT: Oral care    PULMONARY:   SBT today  continue O2 as necessary to maintain sats > 90%<94  HOB @ 45 degrees.    Aspiration precautions.   daily  ABG/ CXR,   no vent changes in Mve    CARDIOVASCULAR:   off pressors   goal directed fluids    GI: GI prophylaxis.  Bowel regimen     RENAL:    cont D5W add 1amp HCO3.   Follow up lytes.    Correct as needed. F/u urine lytes/osmol  renal management    INFECTIOUS DISEASE:  abx per ID  Follow up cultures.    HEMATOLOGICAL:  DVT prophylaxis.  steroids for pemphigoid trial Doxycycline 100 Q12  Derm F/U    ENDOCRINE:  Follow up FS. SS. A1c. .

## 2022-02-12 NOTE — PROGRESS NOTE ADULT - SUBJECTIVE AND OBJECTIVE BOX
ARI AM, 88y, Female; MRN# [id number]  Hospital Stay: 3d.    Patient is a 88y old Female who presents with a chief complaint of respiratory failure (11 Feb 2022 22:40)  Currently admitted to the ICU with the primary diagnosis of Acute respiratory failure with hypoxia      SUBJECTIVE:  Interval/Overnight events: No overnight events.    REVIEW OF SYSTEMS:  As per HPI  OBJECTIVE:  VITALS:  T(F): 97.7 (02-12-22 @ 08:00), Max: 98.4 (02-12-22 @ 00:11)  HR: 66 (02-12-22 @ 08:00) (66 - 96)  BP: 160/69 (02-12-22 @ 08:00) (102/53 - 160/70)  ABP: 167/44 (02-12-22 @ 08:00) (82/38 - 180/46)  ABP(mean): 86 (02-12-22 @ 08:00) (54 - 87)  RR: 16 (02-12-22 @ 08:00) (11 - 17)  SpO2: 93% (02-12-22 @ 08:00) (93% - 100%)    Vent Settings  Device: Avea, Mode: AC/ CMV (Assist Control/ Continuous Mandatory Ventilation), RR (machine): 16, TV (machine): 300, FiO2: 35, PEEP: 5, ITime: 1, MAP: 8, PIP: 24  Blood Gas  02-12-22 @ 02:56  pH 7.40 | pCO2 47 | pO2 126 | HCO3 29 | O2 Sat 99.4  02-11-22 @ 14:09  pH 7.34 | pCO2 44 | pO2 177 | HCO3 24 | O2 Sat 100.0    Drips  dextrose 5%. 1000 milliLiter(s) (75 mL/Hr) IV Continuous <Continuous>  fentaNYL   Infusion. 0.5 MICROgram(s)/kG/Hr (2.25 mL/Hr) IV Continuous <Continuous>  insulin regular Infusion 1 Unit(s)/Hr (1 mL/Hr) IV Continuous <Continuous>  norepinephrine Infusion 0.05 MICROgram(s)/kG/Min (4.22 mL/Hr) IV Continuous <Continuous>    I&Os:    02-11-22 @ 07:01  -  02-12-22 @ 07:00  --------------------------------------------------------  IN: 3901.9 mL / OUT: 2175 mL / NET: 1726.9 mL    02-12-22 @ 07:01  -  02-12-22 @ 09:45  --------------------------------------------------------  IN: 161.8 mL / OUT: 315 mL / NET: -153.2 mL      PHYSICAL EXAM:  CONST: thin elderly female  NECK:  supple  CARDIAC:  regular rate and rhythm, normal S1 and S2  RESP:  respiratory rate and effort appear normal for age; lungs are clear to auscultation bilaterally  ABDOMEN:  soft, nontender  MUSCULOSKELETAL/NEURO: sedated  SKIN:  + diffuse bullae    ACTIVE MEDICATIONS:  Standing  ampicillin/sulbactam  IVPB      ampicillin/sulbactam  IVPB 1.5 Gram(s) IV Intermittent every 12 hours  chlorhexidine 0.12% Liquid 15 milliLiter(s) Oral Mucosa every 12 hours  chlorhexidine 4% Liquid 1 Application(s) Topical <User Schedule>  collagenase Ointment 1 Application(s) Topical two times a day  dextrose 5%. 1000 milliLiter(s) (75 mL/Hr) IV Continuous <Continuous>  doxycycline hyclate Capsule 100 milliGRAM(s) Oral every 12 hours  fentaNYL   Infusion. 0.5 MICROgram(s)/kG/Hr (2.25 mL/Hr) IV Continuous <Continuous>  heparin   Injectable 5000 Unit(s) SubCutaneous every 12 hours  influenza  Vaccine (HIGH DOSE) 0.7 milliLiter(s) IntraMuscular once  insulin regular Infusion 1 Unit(s)/Hr (1 mL/Hr) IV Continuous <Continuous>  norepinephrine Infusion 0.05 MICROgram(s)/kG/Min (4.22 mL/Hr) IV Continuous <Continuous>  predniSONE   Tablet 60 milliGRAM(s) Oral daily  vitamin A &amp; D Ointment 1 Application(s) Topical daily    PRN    LABS:  02-11-22 @ 22:11  WBC --, H/H --/--, plt --  Na 151<H>, K 3.5, Cl 117<H>, CO2 24, BUN 57<H>, Cr 1.3, Glu 337<H>    Ca 6.1<L>, Mg --, Phosphorus --    Tot Protein --, Alb --, T. Bili --, D. Bili --  AST --, ALT --, Alk phos --    02-11-22 @ 16:00  WBC --, H/H --/--, plt --  Na 155<H>, K 3.8, Cl 122<H>, CO2 25, BUN 64<HH>, Cr 1.5, Glu 297<H>    Ca 6.2<L>, Mg --, Phosphorus --    Tot Protein --, Alb --, T. Bili --, D. Bili --  AST --, ALT --, Alk phos --    02-11-22 @ 10:58  WBC --, H/H --/--, plt --  Na 157<H>, K 4.1, Cl 126<H>, CO2 19, BUN 72<HH>, Cr 1.7<H>, Glu 198<H>    Ca 6.4<L>, Mg --, Phosphorus --    Tot Protein --, Alb --, T. Bili --, D. Bili --  AST --, ALT --, Alk phos --          02-10-22 @ 04:30:  TSH 1.39      02-10-22 @ 04:30:  Hgb A1c 6.9<H>% | Mean plasma glucose 151<H>    02-10-22 @ 04:30:  Total Chol 83 | Non-HDL 69 | HDL 14<L>  LDL-Direct -- | LDL-Calc 19 | <H>    02-10-22 @ 04:30:  Troponin T 0.13<HH>  02-09-22 @ 17:41:  Troponin T 0.12<HH>  02-08-22 @ 23:55:  Troponin T 0.10<HH>    02-09-22 @ 00:00:  COVID-19 PCR: NotDetec        CULTURES:    Culture - Sputum (collected 02-09-22 @ 18:42)  Source: .Sputum Sputum  Gram Stain (02-10-22 @ 07:17):    Moderate polymorphonuclear leukocytes per low power field    No Squamous epithelial cells per low power field    No organisms seen per oil power field  Preliminary Report (02-11-22 @ 20:09):    Rare Pseudomonas aeruginosa    Culture - Urine (collected 02-09-22 @ 00:00)  Source: Clean Catch Clean Catch (Midstream)  Final Report (02-10-22 @ 06:28):    No growth    Culture - Blood (collected 02-08-22 @ 23:55)  Source: .Blood Blood  Preliminary Report (02-10-22 @ 07:01):    No growth to date.    Culture - Blood (collected 02-08-22 @ 23:55)  Source: .Blood Blood  Preliminary Report (02-10-22 @ 07:01):    No growth to date.      IMAGING:  < from: Xray Chest 1 View- PORTABLE-Routine (Xray Chest 1 View- PORTABLE-Routine in AM.) (02.11.22 @ 05:56) >    Impression:  Bibasilar opacities. No definite pneumothorax. Please note that the left   apex is obscured and cannot be evaluated.    Lines and support devices as described above. There is an endotracheal   tube. Exact positioning of the tip of the endotracheal tube is limited   due to positioning and rotation of patient.    < end of copied text >

## 2022-02-12 NOTE — PROGRESS NOTE ADULT - ASSESSMENT
88y Female with PMH HTN presenting to the hospital [facility] after experiencing respiratory distress. Nephrology consulted for sodium >180 and evidence of JILL in setting of unknown baseline kidney function.     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

## 2022-02-12 NOTE — CONSULT NOTE ADULT - ASSESSMENT
IMP:  - acute resp failure r/t aspiration  - JILL - baseline unknown, improving  - met acidosis  - bullous pemphigoid  - thrombocytopenia  - hypernatremia  - marked hyperglycemia - no home DM meds noted, HbA1c 6.9    - est REE by , by IJE 1008 - higher if derm lesions calculated as one would a burn  - est protein needs > 65 gm/d (will f/u progress, may need UUN study)  - agree with Glucerna 1.2, for now, but at 35 ml/h  - add Beneprotein 3 packets/d (specifically whey source)       This --> 68 gm protein & 1073 kcal/d  - check phos level with am labs  - watch K and Mg and phos closely - pt with JILL but with low-nl lytes  - check zinc level - once sent (don't wait for result, which may take several days) start 220 mg Zn SO4 via OG daily  - add 500 mg vit C via OG daily  - if pt remains afebrile will change protein supplementation to arg-gln and f/u with Burn 1.96

## 2022-02-12 NOTE — PROGRESS NOTE ADULT - SUBJECTIVE AND OBJECTIVE BOX
Patient is a 88y old  Female who presents with a chief complaint of respiratory failure (09 Feb 2022 00:24)        HPI:  87 y/o F w/ PMH/o HTN (as per son at bedside) presenting to the hospital [facility] after experiencing respiratory distress. According to the patients family, the patient was being fed when she began choking and had difficulty breathing. Pt was bag masked by EMS and transported to the hospital. According to the family, the patient began to experience diffuse skin reaction across her entire body as well as ulcerations in her mouth for the past month. The family denied noticing fever or chills.    In the ED, patient was tachypneic and hypoxic,  intubated for airway protection. Pt was found to hypothermic, started on warming blanket. Pt started on IV levophed. S/p IV vanc/ cefepime. called to evaluate         (09 Feb 2022 04:51)      Pt evaluated on rounds.  I reviewed the radiology tests and hospital record.    I reviewed previous notes on this patient.    Interval Events: No overnight events.      CAM:    SAT:    SBT:      REVIEW OF SYSTEMS:   see HPI      OBJECTIVE:  ICU Vital Signs Last 24 Hrs  T(C): 36.6 (12 Feb 2022 04:00), Max: 36.9 (12 Feb 2022 00:11)  T(F): 97.8 (12 Feb 2022 04:00), Max: 98.4 (12 Feb 2022 00:11)  HR: 80 (12 Feb 2022 06:00) (77 - 96)  BP: 120/56 (12 Feb 2022 06:00) (102/53 - 160/70)  BP(mean): 80 (12 Feb 2022 06:00) (73 - 101)  ABP: 112/38 (12 Feb 2022 06:00) (82/38 - 180/46)  ABP(mean): 64 (12 Feb 2022 06:00) (54 - 87)  RR: 17 (12 Feb 2022 06:00) (3 - 17)  SpO2: 100% (12 Feb 2022 06:00) (94% - 100%)    Mode: AC/ CMV (Assist Control/ Continuous Mandatory Ventilation), RR (machine): 16, TV (machine): 300, FiO2: 35, PEEP: 5, ITime: 1, MAP: 8, PIP: 22    02-10 @ 07:01 - 02-11 @ 07:00  --------------------------------------------------------  IN: 3367.8 mL / OUT: 825 mL / NET: 2542.8 mL    02-11 @ 07:01 - 02-12 @ 06:31  --------------------------------------------------------  IN: 2461.3 mL / OUT: 1355 mL / NET: 1106.3 mL      CAPILLARY BLOOD GLUCOSE      POCT Blood Glucose.: 367 mg/dL (12 Feb 2022 04:13)        PHYSICAL EXAM:       · ENMT:   Airway patent,   Nasal mucosa clear.  Mouth with normal mucosa.   No thrush    · EYES:   Clear bilaterally,   pupils equal,   round and reactive to light.    · CARDIAC:   Normal rate,   regular rhythm.    Heart sounds S1, S2.   No murmurs, no rubs or gallops on auscultation  no edema        CAROTID:   normal systolic impulse  no bruits    · RESPIRATORY:   rales  normal chest expansion  no retractions or use of accessory muscles  palpation of chest is normal with no fremitus  percussion of chest demonstrates no hyperresonance or dullness    · GASTROINTESTINAL:  Abdomen soft,   non-tender,   + BS  liver/spleen not palpable    · MUSCULOSKELETAL:   no clubbing, cyanosis      · SKIN:   Skin normal color for race,   warm, dry   No evidence of rash.        · HEME LYMPH:   no splenomegaly.  No cervical  lymphadenopathy.  no inguinal lymphadenopathy    HOSPITAL MEDICATIONS:  MEDICATIONS  (STANDING):  ampicillin/sulbactam  IVPB      ampicillin/sulbactam  IVPB 1.5 Gram(s) IV Intermittent every 12 hours  chlorhexidine 0.12% Liquid 15 milliLiter(s) Oral Mucosa every 12 hours  chlorhexidine 4% Liquid 1 Application(s) Topical <User Schedule>  collagenase Ointment 1 Application(s) Topical two times a day  dextrose 5% 1000 milliLiter(s) (150 mL/Hr) IV Continuous <Continuous>  fentaNYL   Infusion. 0.5 MICROgram(s)/kG/Hr (2.25 mL/Hr) IV Continuous <Continuous>  heparin   Injectable 5000 Unit(s) SubCutaneous every 12 hours  influenza  Vaccine (HIGH DOSE) 0.7 milliLiter(s) IntraMuscular once  norepinephrine Infusion 0.05 MICROgram(s)/kG/Min (4.22 mL/Hr) IV Continuous <Continuous>  predniSONE   Tablet 60 milliGRAM(s) Oral daily  vitamin A &amp; D Ointment 1 Application(s) Topical daily    MEDICATIONS  (PRN):    sodium chloride 0.45%.: Solution, 1000 milliLiter(s) infuse at 100 mL/Hr  lactated ringers Bolus:   1000 milliLiter(s), IV Bolus, once, infuse over 60 Minute(s), Stop After 1 Doses  lactated ringers.: Solution, 1000 milliLiter(s) infuse at 60 mL/Hr  lactated ringers Bolus:   2000 milliLiter(s), IV Bolus, once, infuse over 60 Minute(s), Stop After 1 Doses  Provider's Contact #: [phone]      LABS:                        8.2    10.83 )-----------( 49       ( 11 Feb 2022 04:32 )             27.0     02-11    151<H>  |  117<H>  |  57<H>  ----------------------------<  337<H>  3.5   |  24  |  1.3    Ca    6.1<L>      11 Feb 2022 22:11  Mg     1.9     02-11    TPro  4.7<L>  /  Alb  2.0<L>  /  TBili  0.6  /  DBili  x   /  AST  76<H>  /  ALT  45<H>  /  AlkPhos  69  02-11        Arterial Blood Gas:  02-12 @ 02:56  7.40/47/126/29/99.4/3.7  ABG lactate: --  Arterial Blood Gas:  02-11 @ 14:09  7.34/44/177/24/100.0/-2.0  ABG lactate: --  Arterial Blood Gas:  02-11 @ 03:22  7.29/40/152/19/--/-7.0  ABG lactate: --  Arterial Blood Gas:  02-10 @ 13:30  7.33/37/198/20/100.0/-5.9  ABG lactate: --      Mode: AC/ CMV (Assist Control/ Continuous Mandatory Ventilation), RR (machine): 16, TV (machine): 300, FiO2: 35, PEEP: 5, ITime: 1, MAP: 8, PIP: 22      COVID-19 PCR: NotDetec (09 Feb 2022 00:00)    Mode: AC/ CMV (Assist Control/ Continuous Mandatory Ventilation)  RR (machine): 16  TV (machine): 300  FiO2: 35  PEEP: 5  ITime: 1  MAP: 8  PIP: 22      ABG - ( 12 Feb 2022 02:56 )  pH, Arterial: 7.40  pH, Blood: x     /  pCO2: 47    /  pO2: 126   / HCO3: 29    / Base Excess: 3.7   /  SaO2: 99.4                RADIOLOGY: Today I personally interpreted the latest CXR and other pertinent films.       Patient is a 88y old  Female who presents with a chief complaint of respiratory failure (09 Feb 2022 00:24)        HPI:  87 y/o F w/ PMH/o HTN (as per son at bedside) presenting to the hospital [facility] after experiencing respiratory distress. According to the patients family, the patient was being fed when she began choking and had difficulty breathing. Pt was bag masked by EMS and transported to the hospital. According to the family, the patient began to experience diffuse skin reaction across her entire body as well as ulcerations in her mouth for the past month. The family denied noticing fever or chills.    In the ED, patient was tachypneic and hypoxic,  intubated for airway protection. Pt was found to hypothermic, started on warming blanket. Pt started on IV levophed. S/p IV vanc/ cefepime. called to evaluate         (09 Feb 2022 04:51)      Pt evaluated on rounds.  I reviewed the radiology tests and hospital record.    I reviewed previous notes on this patient.    Interval Events: No overnight events.      CAM:+    SAT:+    SBT:+      REVIEW OF SYSTEMS:   see HPI      OBJECTIVE:  ICU Vital Signs Last 24 Hrs  T(C): 36.6 (12 Feb 2022 04:00), Max: 36.9 (12 Feb 2022 00:11)  T(F): 97.8 (12 Feb 2022 04:00), Max: 98.4 (12 Feb 2022 00:11)  HR: 80 (12 Feb 2022 06:00) (77 - 96)  BP: 120/56 (12 Feb 2022 06:00) (102/53 - 160/70)  BP(mean): 80 (12 Feb 2022 06:00) (73 - 101)  ABP: 112/38 (12 Feb 2022 06:00) (82/38 - 180/46)  ABP(mean): 64 (12 Feb 2022 06:00) (54 - 87)  RR: 17 (12 Feb 2022 06:00) (3 - 17)  SpO2: 100% (12 Feb 2022 06:00) (94% - 100%)    Mode: AC/ CMV (Assist Control/ Continuous Mandatory Ventilation), RR (machine): 16, TV (machine): 300, FiO2: 35, PEEP: 5, ITime: 1, MAP: 8, PIP: 22    02-10 @ 07:01  - 02-11 @ 07:00  --------------------------------------------------------  IN: 3367.8 mL / OUT: 825 mL / NET: 2542.8 mL    02-11 @ 07:01 - 02-12 @ 06:31  --------------------------------------------------------  IN: 2461.3 mL / OUT: 1355 mL / NET: 1106.3 mL      CAPILLARY BLOOD GLUCOSE      POCT Blood Glucose.: 367 mg/dL (12 Feb 2022 04:13)        PHYSICAL EXAM:       · ENMT:   Airway patent,   Nasal mucosa clear.  Mouth with normal mucosa.   No thrush    · EYES:   Clear bilaterally,   pupils equal,   round and reactive to light.    · CARDIAC:   Normal rate,   regular rhythm.    Heart sounds S1, S2.   No murmurs, no rubs or gallops on auscultation  no edema        CAROTID:   normal systolic impulse  no bruits    · RESPIRATORY:   rales  normal chest expansion  no retractions or use of accessory muscles  palpation of chest is normal with no fremitus  percussion of chest demonstrates no hyperresonance or dullness    · GASTROINTESTINAL:  Abdomen soft,   non-tender,   + BS  liver/spleen not palpable    · MUSCULOSKELETAL:   no clubbing, cyanosis      · SKIN:   Skin normal color for race,   warm, dry   No evidence of rash.        · HEME LYMPH:   no splenomegaly.  No cervical  lymphadenopathy.  no inguinal lymphadenopathy    HOSPITAL MEDICATIONS:  MEDICATIONS  (STANDING):  ampicillin/sulbactam  IVPB      ampicillin/sulbactam  IVPB 1.5 Gram(s) IV Intermittent every 12 hours  chlorhexidine 0.12% Liquid 15 milliLiter(s) Oral Mucosa every 12 hours  chlorhexidine 4% Liquid 1 Application(s) Topical <User Schedule>  collagenase Ointment 1 Application(s) Topical two times a day  dextrose 5% 1000 milliLiter(s) (150 mL/Hr) IV Continuous <Continuous>  fentaNYL   Infusion. 0.5 MICROgram(s)/kG/Hr (2.25 mL/Hr) IV Continuous <Continuous>  heparin   Injectable 5000 Unit(s) SubCutaneous every 12 hours  influenza  Vaccine (HIGH DOSE) 0.7 milliLiter(s) IntraMuscular once  norepinephrine Infusion 0.05 MICROgram(s)/kG/Min (4.22 mL/Hr) IV Continuous <Continuous>  predniSONE   Tablet 60 milliGRAM(s) Oral daily  vitamin A &amp; D Ointment 1 Application(s) Topical daily    MEDICATIONS  (PRN):    sodium chloride 0.45%.: Solution, 1000 milliLiter(s) infuse at 100 mL/Hr  lactated ringers Bolus:   1000 milliLiter(s), IV Bolus, once, infuse over 60 Minute(s), Stop After 1 Doses  lactated ringers.: Solution, 1000 milliLiter(s) infuse at 60 mL/Hr  lactated ringers Bolus:   2000 milliLiter(s), IV Bolus, once, infuse over 60 Minute(s), Stop After 1 Doses  Provider's Contact #: [phone]      LABS:                        8.2    10.83 )-----------( 49       ( 11 Feb 2022 04:32 )             27.0     02-11    151<H>  |  117<H>  |  57<H>  ----------------------------<  337<H>  3.5   |  24  |  1.3    Ca    6.1<L>      11 Feb 2022 22:11  Mg     1.9     02-11    TPro  4.7<L>  /  Alb  2.0<L>  /  TBili  0.6  /  DBili  x   /  AST  76<H>  /  ALT  45<H>  /  AlkPhos  69  02-11        Arterial Blood Gas:  02-12 @ 02:56  7.40/47/126/29/99.4/3.7  ABG lactate: --  Arterial Blood Gas:  02-11 @ 14:09  7.34/44/177/24/100.0/-2.0  ABG lactate: --  Arterial Blood Gas:  02-11 @ 03:22  7.29/40/152/19/--/-7.0  ABG lactate: --  Arterial Blood Gas:  02-10 @ 13:30  7.33/37/198/20/100.0/-5.9  ABG lactate: --      Mode: AC/ CMV (Assist Control/ Continuous Mandatory Ventilation), RR (machine): 16, TV (machine): 300, FiO2: 35, PEEP: 5, ITime: 1, MAP: 8, PIP: 22      COVID-19 PCR: NotDetec (09 Feb 2022 00:00)    Mode: AC/ CMV (Assist Control/ Continuous Mandatory Ventilation)  RR (machine): 16  TV (machine): 300  FiO2: 35  PEEP: 5  ITime: 1  MAP: 8  PIP: 22      ABG - ( 12 Feb 2022 02:56 )  pH, Arterial: 7.40  pH, Blood: x     /  pCO2: 47    /  pO2: 126   / HCO3: 29    / Base Excess: 3.7   /  SaO2: 99.4        RADIOLOGY: Today I personally interpreted the latest CXR and other pertinent films.

## 2022-02-12 NOTE — PROGRESS NOTE ADULT - ASSESSMENT
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     # Acute hypoxemic respiratory failure 2/2 aspiration  - Intubated on arrival  - Vent settings 16/300/5/40%   - DTA 2/9 negative  - Blood cx 2/8 negative   - SAT today  - F/u ABG  - F/u CXR    # Hypernatremia  # JILL  - Na >180 on presentation  - BUN/Cr 116/3.3 on presentation (baseline Cr 0.5)  - Switch to D5@75   - Trend BMP  - Goal decrease by 8-10 per 24hrs  - Nephro following    # 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

## 2022-02-12 NOTE — PROGRESS NOTE ADULT - SUBJECTIVE AND OBJECTIVE BOX
seen and examined  intubated/ventilated   on pressors         PAST HISTORY  --------------------------------------------------------------------------------  No significant changes to PMH, PSH, FHx, SHx, unless otherwise noted    ALLERGIES & MEDICATIONS  --------------------------------------------------------------------------------  Allergies    No Known Allergies    Intolerances      Standing Inpatient Medications  ampicillin/sulbactam  IVPB      ampicillin/sulbactam  IVPB 1.5 Gram(s) IV Intermittent every 12 hours  chlorhexidine 0.12% Liquid 15 milliLiter(s) Oral Mucosa every 12 hours  chlorhexidine 4% Liquid 1 Application(s) Topical <User Schedule>  collagenase Ointment 1 Application(s) Topical two times a day  dextrose 5% 1000 milliLiter(s) IV Continuous <Continuous>  fentaNYL   Infusion. 0.5 MICROgram(s)/kG/Hr IV Continuous <Continuous>  heparin   Injectable 5000 Unit(s) SubCutaneous every 12 hours  influenza  Vaccine (HIGH DOSE) 0.7 milliLiter(s) IntraMuscular once  norepinephrine Infusion 0.05 MICROgram(s)/kG/Min IV Continuous <Continuous>  predniSONE   Tablet 60 milliGRAM(s) Oral daily  vitamin A &amp; D Ointment 1 Application(s) Topical daily    PRN Inpatient Medications          VITALS/PHYSICAL EXAM  --------------------------------------------------------------------------------  T(C): 36.6 (02-12-22 @ 04:00), Max: 36.9 (02-12-22 @ 00:11)  HR: 80 (02-12-22 @ 06:00) (77 - 96)  BP: 120/56 (02-12-22 @ 06:00) (102/53 - 160/70)  RR: 17 (02-12-22 @ 06:00) (3 - 17)  SpO2: 100% (02-12-22 @ 06:00) (94% - 100%)  Wt(kg): --        02-10-22 @ 07:01  -  02-11-22 @ 07:00  --------------------------------------------------------  IN: 3367.8 mL / OUT: 825 mL / NET: 2542.8 mL    02-11-22 @ 07:01  -  02-12-22 @ 06:48  --------------------------------------------------------  IN: 3796.9 mL / OUT: 2175 mL / NET: 1621.9 mL      Physical Exam:  	Gen: intubated/ventilated   	Pulm: B/L abelardo   	CV:  S1S2; no rub  	Abd: +distended      LABS/STUDIES  --------------------------------------------------------------------------------              8.2    10.83 >-----------<  49       [02-11-22 @ 04:32]              27.0     151  |  117  |  57  ----------------------------<  337      [02-11-22 @ 22:11]  3.5   |  24  |  1.3        Ca     6.1     [02-11-22 @ 22:11]      Mg     1.9     [02-11-22 @ 04:32]    TPro  4.7  /  Alb  2.0  /  TBili  0.6  /  DBili  x   /  AST  76  /  ALT  45  /  AlkPhos  69  [02-11-22 @ 04:32]      Creatinine Trend:  SCr 1.3 [02-11 @ 22:11]  SCr 1.5 [02-11 @ 16:00]  SCr 1.7 [02-11 @ 10:58]  SCr 1.8 [02-11 @ 04:32]  SCr 1.9 [02-11 @ 00:25]    Urinalysis - [02-09-22 @ 00:00]      Color Yellow / Appearance Slightly Turbid / SG 1.026 / pH 5.5      Gluc Negative / Ketone Trace  / Bili Negative / Urobili <2 mg/dL       Blood Moderate / Protein 30 mg/dL / Leuk Est Negative / Nitrite Negative      RBC 5 / WBC 3 / Hyaline 13 / Gran  / Sq Epi  / Non Sq Epi 2 / Bacteria Negative    Urine Creatinine 116      [02-09-22 @ 18:52]  Urine Sodium 45.0      [02-09-22 @ 18:52]  Urine Osmolality 598      [02-09-22 @ 18:52]    TSH 1.39      [02-10-22 @ 04:30]  Lipid: chol 83, , HDL 14, LDL --      [02-10-22 @ 04:30]

## 2022-02-12 NOTE — CONSULT NOTE ADULT - SUBJECTIVE AND OBJECTIVE BOX
preliminary note:  Patient is a 88y Female with PMH HTN presenting to the hospital [facility] after experiencing respiratory distress. According to the patients family, the patient was being fed when she began choking and had difficulty breathing. Pt was bag masked by EMS and transported to the hospital. According to the family, the patient began to experience diffuse skin reaction across her entire body as well as ulcerations in her mouth for the past month. The family denied noticing fever or chills.  In the ED, patient was tachypneic and hypoxic,  intubated for airway protection. Pt was found to hypothermic, started on warming blanket. Pt started on IV levophed. S/p IV vanc/ cefepime.     Nephrology consulted due to sodium >180 and evidence of JILL (baseline kidney function is unknown). Patient clinically appears dry.   Derm & Burn teams consulted  Pulmonary notes appreciated    Vital Signs Last 24 Hrs  T(C): 36.5 (12 Feb 2022 08:00), Max: 36.9 (12 Feb 2022 00:11)  T(F): 97.7 (12 Feb 2022 08:00), Max: 98.4 (12 Feb 2022 00:11)  HR: 66 (12 Feb 2022 08:00) (66 - 96)  BP: 160/69 (12 Feb 2022 08:00) (102/53 - 160/70)  BP(mean): 99 (12 Feb 2022 08:00) (73 - 101)  RR: 16 (12 Feb 2022 08:00) (11 - 17)  SpO2: 93% (12 Feb 2022 08:00) (93% - 100%)  Drug Dosing Weight  Height (cm): 142.2 (09 Feb 2022 12:40)  Weight (kg): 38.3 (09 Feb 2022 12:40)  BMI (kg/m2): 18.9 (09 Feb 2022 12:40)  BSA (m2): 1.23 (09 Feb 2022 12:40)    MEDICATIONS  (STANDING):  ampicillin/sulbactam  IVPB 1.5 Gram(s) IV Intermittent every 12 hours  chlorhexidine 0.12% Liquid 15 milliLiter(s) Oral Mucosa every 12 hours  chlorhexidine 4% Liquid 1 Application(s) Topical <User Schedule>  collagenase Ointment 1 Application(s) Topical two times a day  dextrose 5%. 1000 milliLiter(s) (75 mL/Hr) IV Continuous <Continuous>  doxycycline hyclate Capsule 100 milliGRAM(s) Oral every 12 hours  fentaNYL   Infusion. 0.5 MICROgram(s)/kG/Hr (2.25 mL/Hr) IV Continuous <Continuous>  heparin   Injectable 5000 Unit(s) SubCutaneous every 12 hours  influenza  Vaccine (HIGH DOSE) 0.7 milliLiter(s) IntraMuscular once  insulin regular Infusion 1 Unit(s)/Hr (1 mL/Hr) IV Continuous <Continuous>  norepinephrine Infusion 0.05 MICROgram(s)/kG/Min (4.22 mL/Hr) IV Continuous <Continuous>  predniSONE   Tablet 60 milliGRAM(s) Oral daily  vitamin A &amp; D Ointment 1 Application(s) Topical daily    Mode: AC/ CMV (Assist Control/ Continuous Mandatory Ventilation)  RR (machine): 16  TV (machine): 300  FiO2: 35  PEEP: 5  ITime: 1  MAP: 8  PIP: 24  ABG - ( 12 Feb 2022 02:56 )  pH, Arterial: 7.40  pH, Blood: x     /  pCO2: 47    /  pO2: 126   / HCO3: 29    / Base Excess: 3.7   /  SaO2: 99.4                            8.2    10.83 )-----------( 49       ( 11 Feb 2022 04:32 )             27.0   02-11    151<H>  |  117<H>  |  57<H>  ----------------------------<  337<H>  3.5   |  24  |  1.3    Ca    6.1<L>      11 Feb 2022 22:11  Mg     1.9     02-11    TPro  4.7<L>  /  Alb  2.0<L>  /  TBili  0.6  /  DBili  x   /  AST  76<H>  /  ALT  45<H>  /  AlkPhos  69  02-11  A1C with Estimated Average Glucose (02.10.22 @ 04:30)   A1C with Estimated Average Glucose Result: 6.9:   < from: Xray Chest 1 View- PORTABLE-Routine (Xray Chest 1 View- PORTABLE-Routine in AM.) (02.11.22 @ 05:56) >  Support devices: There is an endotracheal tube. Exact positioning of the   tip of the endotracheal tube is limited due to positioning and rotation   of patient. There is an enteric tube coursing below the diaphragm    Cardiac/mediastinum/hilum: No significant change    Lung parenchyma/Pleura: Bibasilar opacities. No definite pneumothorax.   Please note that the left apex is obscured and cannot be evaluated.    < end of copied text >

## 2022-02-13 LAB
ALBUMIN SERPL ELPH-MCNC: 1.6 G/DL — LOW (ref 3.5–5.2)
ALBUMIN SERPL ELPH-MCNC: 1.9 G/DL — LOW (ref 3.5–5.2)
ALP SERPL-CCNC: 127 U/L — HIGH (ref 30–115)
ALP SERPL-CCNC: 133 U/L — HIGH (ref 30–115)
ALT FLD-CCNC: 71 U/L — HIGH (ref 0–41)
ALT FLD-CCNC: 79 U/L — HIGH (ref 0–41)
ANION GAP SERPL CALC-SCNC: 12 MMOL/L — SIGNIFICANT CHANGE UP (ref 7–14)
ANION GAP SERPL CALC-SCNC: 13 MMOL/L — SIGNIFICANT CHANGE UP (ref 7–14)
ANION GAP SERPL CALC-SCNC: 8 MMOL/L — SIGNIFICANT CHANGE UP (ref 7–14)
ANION GAP SERPL CALC-SCNC: 9 MMOL/L — SIGNIFICANT CHANGE UP (ref 7–14)
AST SERPL-CCNC: 151 U/L — HIGH (ref 0–41)
AST SERPL-CCNC: 181 U/L — HIGH (ref 0–41)
BASOPHILS # BLD AUTO: 0.01 K/UL — SIGNIFICANT CHANGE UP (ref 0–0.2)
BASOPHILS # BLD AUTO: 0.01 K/UL — SIGNIFICANT CHANGE UP (ref 0–0.2)
BASOPHILS NFR BLD AUTO: 0.1 % — SIGNIFICANT CHANGE UP (ref 0–1)
BASOPHILS NFR BLD AUTO: 0.1 % — SIGNIFICANT CHANGE UP (ref 0–1)
BILIRUB SERPL-MCNC: 0.8 MG/DL — SIGNIFICANT CHANGE UP (ref 0.2–1.2)
BILIRUB SERPL-MCNC: 0.9 MG/DL — SIGNIFICANT CHANGE UP (ref 0.2–1.2)
BLD GP AB SCN SERPL QL: SIGNIFICANT CHANGE UP
BUN SERPL-MCNC: 32 MG/DL — HIGH (ref 10–20)
BUN SERPL-MCNC: 33 MG/DL — HIGH (ref 10–20)
BUN SERPL-MCNC: 36 MG/DL — HIGH (ref 10–20)
BUN SERPL-MCNC: 38 MG/DL — HIGH (ref 10–20)
CALCIUM SERPL-MCNC: 5.7 MG/DL — CRITICAL LOW (ref 8.5–10.1)
CALCIUM SERPL-MCNC: 5.8 MG/DL — CRITICAL LOW (ref 8.5–10.1)
CALCIUM SERPL-MCNC: 5.8 MG/DL — CRITICAL LOW (ref 8.5–10.1)
CALCIUM SERPL-MCNC: 6 MG/DL — LOW (ref 8.5–10.1)
CHLORIDE SERPL-SCNC: 104 MMOL/L — SIGNIFICANT CHANGE UP (ref 98–110)
CHLORIDE SERPL-SCNC: 107 MMOL/L — SIGNIFICANT CHANGE UP (ref 98–110)
CHLORIDE SERPL-SCNC: 108 MMOL/L — SIGNIFICANT CHANGE UP (ref 98–110)
CHLORIDE SERPL-SCNC: 110 MMOL/L — SIGNIFICANT CHANGE UP (ref 98–110)
CO2 SERPL-SCNC: 25 MMOL/L — SIGNIFICANT CHANGE UP (ref 17–32)
CREAT SERPL-MCNC: 0.7 MG/DL — SIGNIFICANT CHANGE UP (ref 0.7–1.5)
CREAT SERPL-MCNC: 0.7 MG/DL — SIGNIFICANT CHANGE UP (ref 0.7–1.5)
CREAT SERPL-MCNC: 0.9 MG/DL — SIGNIFICANT CHANGE UP (ref 0.7–1.5)
CREAT SERPL-MCNC: 0.9 MG/DL — SIGNIFICANT CHANGE UP (ref 0.7–1.5)
EOSINOPHIL # BLD AUTO: 0 K/UL — SIGNIFICANT CHANGE UP (ref 0–0.7)
EOSINOPHIL # BLD AUTO: 0 K/UL — SIGNIFICANT CHANGE UP (ref 0–0.7)
EOSINOPHIL NFR BLD AUTO: 0 % — SIGNIFICANT CHANGE UP (ref 0–8)
EOSINOPHIL NFR BLD AUTO: 0 % — SIGNIFICANT CHANGE UP (ref 0–8)
GLUCOSE BLDC GLUCOMTR-MCNC: 102 MG/DL — HIGH (ref 70–99)
GLUCOSE BLDC GLUCOMTR-MCNC: 111 MG/DL — HIGH (ref 70–99)
GLUCOSE BLDC GLUCOMTR-MCNC: 114 MG/DL — HIGH (ref 70–99)
GLUCOSE BLDC GLUCOMTR-MCNC: 118 MG/DL — HIGH (ref 70–99)
GLUCOSE BLDC GLUCOMTR-MCNC: 127 MG/DL — HIGH (ref 70–99)
GLUCOSE BLDC GLUCOMTR-MCNC: 147 MG/DL — HIGH (ref 70–99)
GLUCOSE BLDC GLUCOMTR-MCNC: 152 MG/DL — HIGH (ref 70–99)
GLUCOSE BLDC GLUCOMTR-MCNC: 195 MG/DL — HIGH (ref 70–99)
GLUCOSE BLDC GLUCOMTR-MCNC: 217 MG/DL — HIGH (ref 70–99)
GLUCOSE BLDC GLUCOMTR-MCNC: 220 MG/DL — HIGH (ref 70–99)
GLUCOSE BLDC GLUCOMTR-MCNC: 228 MG/DL — HIGH (ref 70–99)
GLUCOSE BLDC GLUCOMTR-MCNC: 272 MG/DL — HIGH (ref 70–99)
GLUCOSE BLDC GLUCOMTR-MCNC: 282 MG/DL — HIGH (ref 70–99)
GLUCOSE BLDC GLUCOMTR-MCNC: 310 MG/DL — HIGH (ref 70–99)
GLUCOSE BLDC GLUCOMTR-MCNC: 326 MG/DL — HIGH (ref 70–99)
GLUCOSE BLDC GLUCOMTR-MCNC: 80 MG/DL — SIGNIFICANT CHANGE UP (ref 70–99)
GLUCOSE SERPL-MCNC: 116 MG/DL — HIGH (ref 70–99)
GLUCOSE SERPL-MCNC: 164 MG/DL — HIGH (ref 70–99)
GLUCOSE SERPL-MCNC: 246 MG/DL — HIGH (ref 70–99)
GLUCOSE SERPL-MCNC: 331 MG/DL — HIGH (ref 70–99)
HCT VFR BLD CALC: 29.5 % — LOW (ref 37–47)
HCT VFR BLD CALC: 30.6 % — LOW (ref 37–47)
HGB BLD-MCNC: 10 G/DL — LOW (ref 12–16)
HGB BLD-MCNC: 9.9 G/DL — LOW (ref 12–16)
IMM GRANULOCYTES NFR BLD AUTO: 0.5 % — HIGH (ref 0.1–0.3)
IMM GRANULOCYTES NFR BLD AUTO: 0.7 % — HIGH (ref 0.1–0.3)
LYMPHOCYTES # BLD AUTO: 0.29 K/UL — LOW (ref 1.2–3.4)
LYMPHOCYTES # BLD AUTO: 0.3 K/UL — LOW (ref 1.2–3.4)
LYMPHOCYTES # BLD AUTO: 2 % — LOW (ref 20.5–51.1)
LYMPHOCYTES # BLD AUTO: 2 % — LOW (ref 20.5–51.1)
MAGNESIUM SERPL-MCNC: 1.9 MG/DL — SIGNIFICANT CHANGE UP (ref 1.8–2.4)
MCHC RBC-ENTMCNC: 28.8 PG — SIGNIFICANT CHANGE UP (ref 27–31)
MCHC RBC-ENTMCNC: 29.5 PG — SIGNIFICANT CHANGE UP (ref 27–31)
MCHC RBC-ENTMCNC: 32.7 G/DL — SIGNIFICANT CHANGE UP (ref 32–37)
MCHC RBC-ENTMCNC: 33.6 G/DL — SIGNIFICANT CHANGE UP (ref 32–37)
MCV RBC AUTO: 87.8 FL — SIGNIFICANT CHANGE UP (ref 81–99)
MCV RBC AUTO: 88.2 FL — SIGNIFICANT CHANGE UP (ref 81–99)
MONOCYTES # BLD AUTO: 0.4 K/UL — SIGNIFICANT CHANGE UP (ref 0.1–0.6)
MONOCYTES # BLD AUTO: 0.41 K/UL — SIGNIFICANT CHANGE UP (ref 0.1–0.6)
MONOCYTES NFR BLD AUTO: 2.6 % — SIGNIFICANT CHANGE UP (ref 1.7–9.3)
MONOCYTES NFR BLD AUTO: 2.9 % — SIGNIFICANT CHANGE UP (ref 1.7–9.3)
NEUTROPHILS # BLD AUTO: 13.56 K/UL — HIGH (ref 1.4–6.5)
NEUTROPHILS # BLD AUTO: 14.33 K/UL — HIGH (ref 1.4–6.5)
NEUTROPHILS NFR BLD AUTO: 94.3 % — HIGH (ref 42.2–75.2)
NEUTROPHILS NFR BLD AUTO: 94.8 % — HIGH (ref 42.2–75.2)
NRBC # BLD: 0 /100 WBCS — SIGNIFICANT CHANGE UP (ref 0–0)
NRBC # BLD: 0 /100 WBCS — SIGNIFICANT CHANGE UP (ref 0–0)
PLATELET # BLD AUTO: 47 K/UL — LOW (ref 130–400)
PLATELET # BLD AUTO: 49 K/UL — LOW (ref 130–400)
POTASSIUM SERPL-MCNC: 3.1 MMOL/L — LOW (ref 3.5–5)
POTASSIUM SERPL-MCNC: 3.5 MMOL/L — SIGNIFICANT CHANGE UP (ref 3.5–5)
POTASSIUM SERPL-MCNC: 3.5 MMOL/L — SIGNIFICANT CHANGE UP (ref 3.5–5)
POTASSIUM SERPL-MCNC: 3.7 MMOL/L — SIGNIFICANT CHANGE UP (ref 3.5–5)
POTASSIUM SERPL-SCNC: 3.1 MMOL/L — LOW (ref 3.5–5)
POTASSIUM SERPL-SCNC: 3.5 MMOL/L — SIGNIFICANT CHANGE UP (ref 3.5–5)
POTASSIUM SERPL-SCNC: 3.5 MMOL/L — SIGNIFICANT CHANGE UP (ref 3.5–5)
POTASSIUM SERPL-SCNC: 3.7 MMOL/L — SIGNIFICANT CHANGE UP (ref 3.5–5)
PROT SERPL-MCNC: 4.1 G/DL — LOW (ref 6–8)
PROT SERPL-MCNC: 4.6 G/DL — LOW (ref 6–8)
RBC # BLD: 3.36 M/UL — LOW (ref 4.2–5.4)
RBC # BLD: 3.47 M/UL — LOW (ref 4.2–5.4)
RBC # FLD: 15.4 % — HIGH (ref 11.5–14.5)
RBC # FLD: 15.7 % — HIGH (ref 11.5–14.5)
SODIUM SERPL-SCNC: 140 MMOL/L — SIGNIFICANT CHANGE UP (ref 135–146)
SODIUM SERPL-SCNC: 142 MMOL/L — SIGNIFICANT CHANGE UP (ref 135–146)
SODIUM SERPL-SCNC: 142 MMOL/L — SIGNIFICANT CHANGE UP (ref 135–146)
SODIUM SERPL-SCNC: 147 MMOL/L — HIGH (ref 135–146)
WBC # BLD: 14.37 K/UL — HIGH (ref 4.8–10.8)
WBC # BLD: 15.11 K/UL — HIGH (ref 4.8–10.8)
WBC # FLD AUTO: 14.37 K/UL — HIGH (ref 4.8–10.8)
WBC # FLD AUTO: 15.11 K/UL — HIGH (ref 4.8–10.8)

## 2022-02-13 PROCEDURE — 99233 SBSQ HOSP IP/OBS HIGH 50: CPT

## 2022-02-13 PROCEDURE — 71045 X-RAY EXAM CHEST 1 VIEW: CPT | Mod: 26

## 2022-02-13 RX ORDER — MORPHINE SULFATE 50 MG/1
2 CAPSULE, EXTENDED RELEASE ORAL ONCE
Refills: 0 | Status: DISCONTINUED | OUTPATIENT
Start: 2022-02-13 | End: 2022-02-13

## 2022-02-13 RX ORDER — FENTANYL CITRATE 50 UG/ML
50 INJECTION INTRAVENOUS ONCE
Refills: 0 | Status: DISCONTINUED | OUTPATIENT
Start: 2022-02-13 | End: 2022-02-13

## 2022-02-13 RX ORDER — SODIUM CHLORIDE 9 MG/ML
1000 INJECTION INTRAMUSCULAR; INTRAVENOUS; SUBCUTANEOUS
Refills: 0 | Status: DISCONTINUED | OUTPATIENT
Start: 2022-02-13 | End: 2022-02-14

## 2022-02-13 RX ORDER — POTASSIUM CHLORIDE 20 MEQ
40 PACKET (EA) ORAL ONCE
Refills: 0 | Status: COMPLETED | OUTPATIENT
Start: 2022-02-13 | End: 2022-02-13

## 2022-02-13 RX ORDER — DEXMEDETOMIDINE HYDROCHLORIDE IN 0.9% SODIUM CHLORIDE 4 UG/ML
0.2 INJECTION INTRAVENOUS
Qty: 400 | Refills: 0 | Status: DISCONTINUED | OUTPATIENT
Start: 2022-02-13 | End: 2022-02-14

## 2022-02-13 RX ORDER — POTASSIUM CHLORIDE 20 MEQ
40 PACKET (EA) ORAL ONCE
Refills: 0 | Status: DISCONTINUED | OUTPATIENT
Start: 2022-02-13 | End: 2022-02-13

## 2022-02-13 RX ADMIN — HEPARIN SODIUM 5000 UNIT(S): 5000 INJECTION INTRAVENOUS; SUBCUTANEOUS at 17:22

## 2022-02-13 RX ADMIN — AMPICILLIN SODIUM AND SULBACTAM SODIUM 100 GRAM(S): 250; 125 INJECTION, POWDER, FOR SUSPENSION INTRAMUSCULAR; INTRAVENOUS at 17:23

## 2022-02-13 RX ADMIN — CHLORHEXIDINE GLUCONATE 1 APPLICATION(S): 213 SOLUTION TOPICAL at 05:13

## 2022-02-13 RX ADMIN — Medication 1 APPLICATION(S): at 17:31

## 2022-02-13 RX ADMIN — FENTANYL CITRATE 50 MICROGRAM(S): 50 INJECTION INTRAVENOUS at 17:20

## 2022-02-13 RX ADMIN — Medication 100 MILLIGRAM(S): at 17:22

## 2022-02-13 RX ADMIN — DEXMEDETOMIDINE HYDROCHLORIDE IN 0.9% SODIUM CHLORIDE 1.92 MICROGRAM(S)/KG/HR: 4 INJECTION INTRAVENOUS at 13:23

## 2022-02-13 RX ADMIN — AMPICILLIN SODIUM AND SULBACTAM SODIUM 100 GRAM(S): 250; 125 INJECTION, POWDER, FOR SUSPENSION INTRAMUSCULAR; INTRAVENOUS at 05:13

## 2022-02-13 RX ADMIN — MORPHINE SULFATE 2 MILLIGRAM(S): 50 CAPSULE, EXTENDED RELEASE ORAL at 14:13

## 2022-02-13 RX ADMIN — MORPHINE SULFATE 2 MILLIGRAM(S): 50 CAPSULE, EXTENDED RELEASE ORAL at 14:43

## 2022-02-13 RX ADMIN — CHLORHEXIDINE GLUCONATE 15 MILLILITER(S): 213 SOLUTION TOPICAL at 17:22

## 2022-02-13 RX ADMIN — Medication 60 MILLIGRAM(S): at 05:14

## 2022-02-13 RX ADMIN — Medication 1 APPLICATION(S): at 11:21

## 2022-02-13 RX ADMIN — SODIUM CHLORIDE 70 MILLILITER(S): 9 INJECTION INTRAMUSCULAR; INTRAVENOUS; SUBCUTANEOUS at 16:12

## 2022-02-13 RX ADMIN — HEPARIN SODIUM 5000 UNIT(S): 5000 INJECTION INTRAVENOUS; SUBCUTANEOUS at 05:15

## 2022-02-13 RX ADMIN — Medication 40 MILLIEQUIVALENT(S): at 11:10

## 2022-02-13 RX ADMIN — FENTANYL CITRATE 50 MICROGRAM(S): 50 INJECTION INTRAVENOUS at 16:52

## 2022-02-13 RX ADMIN — CHLORHEXIDINE GLUCONATE 15 MILLILITER(S): 213 SOLUTION TOPICAL at 05:14

## 2022-02-13 RX ADMIN — Medication 100 MILLIGRAM(S): at 05:14

## 2022-02-13 RX ADMIN — Medication 1 APPLICATION(S): at 05:14

## 2022-02-13 NOTE — PROGRESS NOTE ADULT - ASSESSMENT
IMPRESSION:  Acute Hypoxic Respiratory Failure 2/2 aspiration  Severe hypernatremia  HT1: Mild Hypothermia  Severely reduced kidney function (Possible prerenal JILL, baseline Cr unknown)  HTN? History limited  bullous pemphigoid  NAGMA    PLAN:    CNS: fentanyl for sedation,  wean off as tolerated  SAT    HEENT: Oral care    PULMONARY:   SBT today  continue O2 as necessary to maintain sats > 90%<94  HOB @ 45 degrees.    Aspiration precautions.   daily  ABG/ CXR,   decrease Mve    CARDIOVASCULAR:   off pressors   goal directed fluids    GI: GI prophylaxis.  Bowel regimen     RENAL:    d/c IVF  supplement lytes.    Correct as needed.  renal management    INFECTIOUS DISEASE:  abx per ID  Follow up cultures.    HEMATOLOGICAL:  DVT prophylaxis.  steroids for pemphigoid trial Doxycycline 100 Q12  Derm F/U    ENDOCRINE:  Follow up FS. SS. A1c. .

## 2022-02-13 NOTE — PROGRESS NOTE ADULT - SUBJECTIVE AND OBJECTIVE BOX
Nephrology Progress Note    ARI MA  [id number]  88y  Female    S:  Patient is seen and examined, events over the last 24h noted.    O:  Allergies:  No Known Allergies    Hospital Medications:   MEDICATIONS  (STANDING):  ampicillin/sulbactam  IVPB      ampicillin/sulbactam  IVPB 1.5 Gram(s) IV Intermittent every 12 hours  chlorhexidine 0.12% Liquid 15 milliLiter(s) Oral Mucosa every 12 hours  chlorhexidine 4% Liquid 1 Application(s) Topical <User Schedule>  collagenase Ointment 1 Application(s) Topical two times a day  dexMEDEtomidine Infusion 0.2 MICROgram(s)/kG/Hr (1.92 mL/Hr) IV Continuous <Continuous>  doxycycline hyclate Capsule 100 milliGRAM(s) Oral every 12 hours  fentaNYL   Infusion. 0.5 MICROgram(s)/kG/Hr (2.25 mL/Hr) IV Continuous <Continuous>  heparin   Injectable 5000 Unit(s) SubCutaneous every 12 hours  influenza  Vaccine (HIGH DOSE) 0.7 milliLiter(s) IntraMuscular once  insulin regular Infusion 1 Unit(s)/Hr (1 mL/Hr) IV Continuous <Continuous>  norepinephrine Infusion 0.05 MICROgram(s)/kG/Min (4.22 mL/Hr) IV Continuous <Continuous>  predniSONE   Tablet 60 milliGRAM(s) Oral daily  sodium chloride 0.9%. 1000 milliLiter(s) (70 mL/Hr) IV Continuous <Continuous>  vitamin A &amp; D Ointment 1 Application(s) Topical daily    MEDICATIONS  (PRN):    Home Medications:      VITALS:  Daily     Daily Weight in k.8 (2022 06:00)  T(F): 97.3 (22 @ 16:00), Max: 97.8 (22 @ 00:00)  HR: 84 (22 @ 20:00)  BP: 168/79 (22 @ 10:00)  RR: 19 (22 @ 20:00)  SpO2: 100% (22 @ 20:00)  Wt(kg): --  I&O's Detail    2022 07:01  -  2022 07:00  --------------------------------------------------------  IN:    dextrose 5%: 1800 mL    Enteral Tube Flush: 900 mL    FentaNYL: 13.4 mL    Insulin: 141 mL    IV PiggyBack: 150 mL    Norepinephrine: 138.9 mL    Osmolite: 890 mL    PRBCs (Packed Red Blood Cells): 350 mL  Total IN: 4383.3 mL    OUT:    Indwelling Catheter - Urethral (mL): 1610 mL  Total OUT: 1610 mL    Total NET: 2773.3 mL      2022 07:01  -  2022 23:54  --------------------------------------------------------  IN:    Dexmedetomidine: 20 mL    Enteral Tube Flush: 600 mL    Glucerna 1.5: 420 mL    Insulin: 40 mL    Norepinephrine: 44 mL    sodium chloride 0.9%: 210 mL  Total IN: 1334 mL    OUT:    Indwelling Catheter - Urethral (mL): 710 mL  Total OUT: 710 mL    Total NET: 624 mL        I&O's Summary    2022 07:01  -  2022 07:00  --------------------------------------------------------  IN: 4383.3 mL / OUT: 1610 mL / NET: 2773.3 mL    2022 07:01  -  2022 23:54  --------------------------------------------------------  IN: 1334 mL / OUT: 710 mL / NET: 624 mL          PHYSICAL EXAM:  Gen: intubated/ventilated  Resp: b/l breath sounds  Card: S1/S2  Abd: soft  Extremities: no edema      LABS:  ABG - ( 2022 13:04 )  pH, Arterial: 7.54  pH, Blood: x     /  pCO2: 31    /  pO2: 156   / HCO3: 26    / Base Excess: 4.5   /  SaO2: 99.8            142  |  108  |  32<H>  ----------------------------<  116<H>  3.5   |  25  |  0.7    Ca    5.8<LL>      2022 19:56  Mg     1.9         TPro  4.1<L>  /  Alb  1.6<L>  /  TBili  0.8  /  DBili      /  AST  181<H>  /  ALT  79<H>  /  AlkPhos  127<H>      eGFR if Non African American: 77 mL/min/1.73M2 (22 @ 19:56)  eGFR if African American: 90 mL/min/1.73M2 (22 @ 19:56)                              9.9    14.37 )-----------( 47       ( 2022 04:30 )             29.5     Mean Cell Volume: 87.8 fL (22 @ 04:30)        Urine Studies:  Urinalysis Basic - ( 2022 00:00 )    Color: Yellow / Appearance: Slightly Turbid / S.026 / pH:   Gluc:  / Ketone: Trace  / Bili: Negative / Urobili: <2 mg/dL   Blood:  / Protein: 30 mg/dL / Nitrite: Negative   Leuk Esterase: Negative / RBC: 5 /HPF / WBC 3 /HPF   Sq Epi:  / Non Sq Epi: 2 /HPF / Bacteria: Negative          Sodium, Random Urine: 45.0 mmoL/L ( @ 18:52)  Osmolality, Random Urine: 598 mos/kg ( @ 18:52)    Creatinine trend:  Creatinine, Serum: 0.7 mg/dL (22 @ 19:56)  Creatinine, Serum: 0.9 mg/dL (22 @ 04:30)  Creatinine, Serum: 1.1 mg/dL (22 @ 10:30)  Creatinine, Serum: 1.9 mg/dL (22 @ 00:25)  Creatinine, Serum: 2.6 mg/dL (02-10-22 @ 04:30)  Creatinine, Serum: 2.7 mg/dL (22 @ 03:30)  Creatinine, Serum: 3.3 mg/dL (22 @ 23:55)    Sodium, Serum: 142 mmol/L (22 @ 19:56)  Sodium, Serum: 140 mmol/L (22 @ 16:00)  Sodium, Serum: 142 mmol/L (22 @ 04:30)  Sodium, Serum: 147 mmol/L (22 @ 23:30)  Sodium, Serum: 144 mmol/L (22 @ 21:40)  Sodium, Serum: 149 mmol/L (22 @ 16:33)  Sodium, Serum: 152 mmol/L (22 @ 10:30)  Sodium, Serum: 151 mmol/L (22 @ 22:11)  Sodium, Serum: 155 mmol/L (22 @ 16:00)  Sodium, Serum: 157 mmol/L (22 @ 10:58)  Sodium, Serum: 159 mmol/L (22 @ 04:32)  Sodium, Serum: 160 mmol/L (22 @ 00:25)  Sodium, Serum: 160 mmol/L (02-10-22 @ 15:40)  Sodium, Serum: 159 mmol/L (02-10-22 @ 11:36)  Sodium, Serum: 170 mmol/L (02-10-22 @ 04:30)  Sodium, Serum: 180 mmol/L (22 @ 17:41)  Sodium, Serum: 176 mmol/L (22 @ 11:54)  Sodium, Serum: 180 mmol/L (22 @ 11:00)  Sodium, Serum: 173 mmol/L (22 @ 03:30)  Sodium, Serum: >180 mmol/L (22 @ 23:55)    Osmolality, Random Urine: 598 mos/kg [50 - 1200] (22 @ 18:52)    Sodium, Random Urine: 45.0 mmoL/L (22 @ 18:52)    Thyroid Stimulating Hormone, Serum: 1.39 uIU/mL [0.27 - 4.20] (02-10-22 @ 04:30)

## 2022-02-13 NOTE — CHART NOTE - NSCHARTNOTEFT_GEN_A_CORE
Patient has a new femoral A-line L side   Patient in distress after line placed ; hypertensive 190s/90s  Patient given 2mg Iv morphine and started on precedex   ABG :   Peak  Plateau   Tidal Vol :   RR : Patient has a new femoral A-line L side   Patient in distress after line placed ; hypertensive 190s/90s  Patient given 2mg Iv morphine and started on precedex   ABG : pH 7.54, PO2 156, pCO2 31, HCO3 26  Peak : 11, Plateau :7  Tidal Vol : 4.6 RR : 12 but breathing 17   Potential contraction alkalosis ; restarted fluid NS at rate of 70 /h  F/u BMP at 8pm Patient has a new femoral A-line L side   Patient in distress after line placed ; hypertensive 190s/90s  Patient given 2mg Iv morphine and started on precedex   ABG : pH 7.54, PO2 156, pCO2 31, HCO3 26  Peak : 11, Plateau :7  Tidal Vol : 4.6 RR : 12 but breathing 17   Restarted fluid NS at rate of 70 /h ; f/u Cheetah     F/u Peak pressure ; alarm is reading high but machine says 11   F/u BMP at 8pm  F/u repeat ABG Patient has a new femoral A-line L side   Patient in distress after line placed ; hypertensive 190s/90s  Patient given 2mg Iv morphine and started on precedex   ABG : pH 7.54, PO2 156, pCO2 31, HCO3 26  Peak : 11, Plateau :7  Tidal Vol : 4.6 RR : 12 but breathing 17   Restarted fluid NS at rate of 70 /h ; f/u Cheetah     F/u Peak pressure ; alarm is reading high but machine says 11   F/u BMP at 8pm  F/u repeat ABG (cam be drawn form a-line)

## 2022-02-13 NOTE — PROCEDURE NOTE - ADDITIONAL PROCEDURE DETAILS
Attempt done on the right side with guidewire meeting resistance, needle removed, applied pressure for 5 min. then attempted on the right side. procedure went well. no complications.

## 2022-02-13 NOTE — PROGRESS NOTE ADULT - SUBJECTIVE AND OBJECTIVE BOX
SUBJECTIVE:    Patient is a 88y old Female who presents with a chief complaint of respiratory failure (11 Feb 2022 22:40)    Currently admitted to medicine with the primary diagnosis of Acute respiratory failure with hypoxia       Today is hospital day 4d. This morning she is in bed and no overnight events.       PAST MEDICAL & SURGICAL HISTORY    SOCIAL HISTORY:    ALLERGIES:  No Known Allergies    MEDICATIONS:  STANDING MEDICATIONS  ampicillin/sulbactam  IVPB      ampicillin/sulbactam  IVPB 1.5 Gram(s) IV Intermittent every 12 hours  chlorhexidine 0.12% Liquid 15 milliLiter(s) Oral Mucosa every 12 hours  chlorhexidine 4% Liquid 1 Application(s) Topical <User Schedule>  collagenase Ointment 1 Application(s) Topical two times a day  dextrose 5%. 1000 milliLiter(s) IV Continuous <Continuous>  doxycycline hyclate Capsule 100 milliGRAM(s) Oral every 12 hours  fentaNYL   Infusion. 0.5 MICROgram(s)/kG/Hr IV Continuous <Continuous>  heparin   Injectable 5000 Unit(s) SubCutaneous every 12 hours  influenza  Vaccine (HIGH DOSE) 0.7 milliLiter(s) IntraMuscular once  insulin regular Infusion 1 Unit(s)/Hr IV Continuous <Continuous>  norepinephrine Infusion 0.05 MICROgram(s)/kG/Min IV Continuous <Continuous>  predniSONE   Tablet 60 milliGRAM(s) Oral daily  vitamin A &amp; D Ointment 1 Application(s) Topical daily    PRN MEDICATIONS    VITALS:   T(F): 98.1  HR: 86  BP: 109/52  RR: 18  SpO2: 100%    LABS:  Negative for smoking/alcohol/drug use.                         10.0   15.11 )-----------( 49       ( 12 Feb 2022 23:30 )             30.6     02-12    144  |  107  |  38<H>  ----------------------------<  226<H>  3.7   |  25  |  0.9    Ca    6.1<L>      12 Feb 2022 21:40  Mg     2.1     02-12    TPro  4.7<L>  /  Alb  1.8<L>  /  TBili  1.2  /  DBili  x   /  AST  177<H>  /  ALT  71<H>  /  AlkPhos  111  02-12      ABG - ( 12 Feb 2022 02:56 )  pH, Arterial: 7.40  pH, Blood: x     /  pCO2: 47    /  pO2: 126   / HCO3: 29    / Base Excess: 3.7   /  SaO2: 99.4            RADIOLOGY:   Xray Chest 1 View- PORTABLE-Routine (Xray Chest 1 View- PORTABLE-Routine in AM.) (02.12.22 @ 07:16)   IMPRESSION:  ET tube just above the gerardo. Recommend repositioning. NG tube in   satisfactory position.  Mild right basal hazy opacities. No pneumothorax.  Stable cardiomediastinal silhouette.  Unchanged osseous structures.      Xray Chest 1 View- PORTABLE-Routine (Xray Chest 1 View- PORTABLE-Routine in AM.) (02.11.22 @ 05:56)   Impression:  Bibasilar opacities. No definite pneumothorax. Please note that the left   apex is obscured and cannot be evaluated.    Lines and support devices as described above. There is an endotracheal   tube. Exact positioning of the tip of the endotracheal tube is limited   due to positioning and rotation of patient.      PHYSICAL EXAM:  GEN: No acute distress  HEENT: normocephalic, atraumatic, aniceteric  LUNGS: Clear to auscultation bilaterally, no rales/wheezing/ rhonchi  HEART: S1/S2 present. RRR, no murmurs  ABD: Soft, non-tender, non-distended. Bowel sounds present  EXT: UE and LE edema  NEURO: intubated  Skin:  diffuse edema, blistering w/ dressings.      ASSESSMENT AND PLAN:  89 y/o F w/ PMH/o HTN (as per son at bedside) presenting to the hospital [facility] after experiencing respiratory distress. According to the patients family, the patient was being fed when she began choking and had difficulty breathing. Pt was bag masked by EMS and transported to the hospital. According to the family, the patient began to experience diffuse skin reaction across her entire body as well as ulcerations in her mouth for the past month. The family denied noticing fever or chills.      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 This morning she is in bed and no overnight events.       PAST MEDICAL & SURGICAL HISTORY    SOCIAL HISTORY:    ALLERGIES:  No Known Allergies    MEDICATIONS:  STANDING MEDICATIONS  ampicillin/sulbactam  IVPB      ampicillin/sulbactam  IVPB 1.5 Gram(s) IV Intermittent every 12 hours  chlorhexidine 0.12% Liquid 15 milliLiter(s) Oral Mucosa every 12 hours  chlorhexidine 4% Liquid 1 Application(s) Topical <User Schedule>  collagenase Ointment 1 Application(s) Topical two times a day  dextrose 5%. 1000 milliLiter(s) IV Continuous <Continuous>  doxycycline hyclate Capsule 100 milliGRAM(s) Oral every 12 hours  fentaNYL   Infusion. 0.5 MICROgram(s)/kG/Hr IV Continuous <Continuous>  heparin   Injectable 5000 Unit(s) SubCutaneous every 12 hours  influenza  Vaccine (HIGH DOSE) 0.7 milliLiter(s) IntraMuscular once  insulin regular Infusion 1 Unit(s)/Hr IV Continuous <Continuous>  norepinephrine Infusion 0.05 MICROgram(s)/kG/Min IV Continuous <Continuous>  predniSONE   Tablet 60 milliGRAM(s) Oral daily  vitamin A &amp; D Ointment 1 Application(s) Topical daily    PRN MEDICATIONS    VITALS:   T(F): 98.1  HR: 86  BP: 109/52  RR: 18  SpO2: 100%    LABS:  Negative for smoking/alcohol/drug use.                         10.0   15.11 )-----------( 49       ( 12 Feb 2022 23:30 )             30.6     02-12    144  |  107  |  38<H>  ----------------------------<  226<H>  3.7   |  25  |  0.9    Ca    6.1<L>      12 Feb 2022 21:40  Mg     2.1     02-12    TPro  4.7<L>  /  Alb  1.8<L>  /  TBili  1.2  /  DBili  x   /  AST  177<H>  /  ALT  71<H>  /  AlkPhos  111  02-12      ABG - ( 12 Feb 2022 02:56 )  pH, Arterial: 7.40  pH, Blood: x     /  pCO2: 47    /  pO2: 126   / HCO3: 29    / Base Excess: 3.7   /  SaO2: 99.4            RADIOLOGY:   Xray Chest 1 View- PORTABLE-Routine (Xray Chest 1 View- PORTABLE-Routine in AM.) (02.12.22 @ 07:16)   IMPRESSION:  ET tube just above the gerardo. Recommend repositioning. NG tube in   satisfactory position.  Mild right basal hazy opacities. No pneumothorax.  Stable cardiomediastinal silhouette.  Unchanged osseous structures.      Xray Chest 1 View- PORTABLE-Routine (Xray Chest 1 View- PORTABLE-Routine in AM.) (02.11.22 @ 05:56)   Impression:  Bibasilar opacities. No definite pneumothorax. Please note that the left   apex is obscured and cannot be evaluated.    Lines and support devices as described above. There is an endotracheal   tube. Exact positioning of the tip of the endotracheal tube is limited   due to positioning and rotation of patient.      PHYSICAL EXAM:  GEN: No acute distress  HEENT: normocephalic, atraumatic, aniceteric  LUNGS: Clear to auscultation bilaterally, no rales/wheezing/ rhonchi  HEART: S1/S2 present. RRR, no murmurs  ABD: Soft, non-tender, non-distended. Bowel sounds present  EXT: UE and LE edema  NEURO: intubated  Skin:  diffuse edema, blistering w/ dressings.      ASSESSMENT AND PLAN:  87 y/o F w/ PMH/o HTN (as per son at bedside) presenting to the hospital [facility] after experiencing respiratory distress. According to the patients family, the patient was being fed when she began choking and had difficulty breathing. Pt was bag masked by EMS and transported to the hospital. According to the family, the patient began to experience diffuse skin reaction across her entire body as well as ulcerations in her mouth for the past month. The family denied noticing fever or chills.      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

## 2022-02-13 NOTE — PROGRESS NOTE ADULT - SUBJECTIVE AND OBJECTIVE BOX
Patient is a 88y old  Female who presents with a chief complaint of respiratory failure (11 Feb 2022 22:40)        HPI:  89 y/o F w/ PMH/o HTN (as per son at bedside) presenting to the hospital [facility] after experiencing respiratory distress. According to the patients family, the patient was being fed when she began choking and had difficulty breathing. Pt was bag masked by EMS and transported to the hospital. According to the family, the patient began to experience diffuse skin reaction across her entire body as well as ulcerations in her mouth for the past month. The family denied noticing fever or chills.    In the ED, patient was tachypneic and hypoxic,  intubated for airway protection. Pt was found to hypothermic, started on warming blanket. Pt started on IV levophed. S/p IV vanc/ cefepime. called to evaluate         (09 Feb 2022 04:51)      Pt evaluated on rounds.  I reviewed the radiology tests and hospital record.    I reviewed previous notes on this patient.    Interval Events: No overnight events.      CAM:+    SAT:+    SBT:+      REVIEW OF SYSTEMS:   see HPI      OBJECTIVE:  ICU Vital Signs Last 24 Hrs  T(C): 36.4 (13 Feb 2022 02:00), Max: 36.7 (12 Feb 2022 22:00)  T(F): 97.5 (13 Feb 2022 02:00), Max: 98.1 (12 Feb 2022 22:00)  HR: 81 (13 Feb 2022 06:00) (55 - 91)  BP: 119/56 (13 Feb 2022 06:00) (83/44 - 160/69)  BP(mean): 80 (13 Feb 2022 06:00) (58 - 99)  ABP: 167/44 (12 Feb 2022 08:00) (167/44 - 167/44)  ABP(mean): 86 (12 Feb 2022 08:00) (86 - 86)  RR: 17 (13 Feb 2022 06:00) (16 - 23)  SpO2: 100% (13 Feb 2022 06:00) (93% - 100%)    Mode: AC/ CMV (Assist Control/ Continuous Mandatory Ventilation), RR (machine): 16, TV (machine): 300, FiO2: 35, PEEP: 5, ITime: 1, MAP: 8, PIP: 23    02-11 @ 07:01  - 02-12 @ 07:00  --------------------------------------------------------  IN: 3901.9 mL / OUT: 2175 mL / NET: 1726.9 mL    02-12 @ 07:01 - 02-13 @ 06:41  --------------------------------------------------------  IN: 4107.3 mL / OUT: 1420 mL / NET: 2687.3 mL      CAPILLARY BLOOD GLUCOSE      POCT Blood Glucose.: 217 mg/dL (13 Feb 2022 05:06)        PHYSICAL EXAM:       · ENMT:   Airway patent,   Nasal mucosa clear.  Mouth with normal mucosa.   No thrush    · EYES:   Clear bilaterally,   pupils equal,   round and reactive to light.    · CARDIAC:   Normal rate,   regular rhythm.    Heart sounds S1, S2.   No murmurs, no rubs or gallops on auscultation  no edema        CAROTID:   normal systolic impulse  no bruits    · RESPIRATORY:   rales  normal chest expansion  no retractions or use of accessory muscles  palpation of chest is normal with no fremitus  percussion of chest demonstrates no hyperresonance or dullness    · GASTROINTESTINAL:  Abdomen soft,   non-tender,   + BS  liver/spleen not palpable    · MUSCULOSKELETAL:   no clubbing, cyanosis      · SKIN:   Skin normal color for race,   warm, dry   No evidence of rash.        · HEME LYMPH:   no splenomegaly.  No cervical  lymphadenopathy.  no inguinal lymphadenopathy    HOSPITAL MEDICATIONS:  MEDICATIONS  (STANDING):  ampicillin/sulbactam  IVPB      ampicillin/sulbactam  IVPB 1.5 Gram(s) IV Intermittent every 12 hours  chlorhexidine 0.12% Liquid 15 milliLiter(s) Oral Mucosa every 12 hours  chlorhexidine 4% Liquid 1 Application(s) Topical <User Schedule>  collagenase Ointment 1 Application(s) Topical two times a day  doxycycline hyclate Capsule 100 milliGRAM(s) Oral every 12 hours  fentaNYL   Infusion. 0.5 MICROgram(s)/kG/Hr (2.25 mL/Hr) IV Continuous <Continuous>  heparin   Injectable 5000 Unit(s) SubCutaneous every 12 hours  influenza  Vaccine (HIGH DOSE) 0.7 milliLiter(s) IntraMuscular once  insulin regular Infusion 1 Unit(s)/Hr (1 mL/Hr) IV Continuous <Continuous>  norepinephrine Infusion 0.05 MICROgram(s)/kG/Min (4.22 mL/Hr) IV Continuous <Continuous>  predniSONE   Tablet 60 milliGRAM(s) Oral daily  vitamin A &amp; D Ointment 1 Application(s) Topical daily    MEDICATIONS  (PRN):    sodium chloride 0.45%.: Solution, 1000 milliLiter(s) infuse at 100 mL/Hr  lactated ringers Bolus:   1000 milliLiter(s), IV Bolus, once, infuse over 60 Minute(s), Stop After 1 Doses  lactated ringers.: Solution, 1000 milliLiter(s) infuse at 60 mL/Hr  lactated ringers Bolus:   2000 milliLiter(s), IV Bolus, once, infuse over 60 Minute(s), Stop After 1 Doses  Provider's Contact #: [phone]      LABS:                        9.9    14.37 )-----------( 47       ( 13 Feb 2022 04:30 )             29.5     02-13    142  |  104  |  36<H>  ----------------------------<  246<H>  3.1<L>   |  25  |  0.9    Ca    6.0<L>      13 Feb 2022 04:30  Mg     1.9     02-13    TPro  4.6<L>  /  Alb  1.9<L>  /  TBili  0.9  /  DBili  x   /  AST  151<H>  /  ALT  71<H>  /  AlkPhos  133<H>  02-13        Arterial Blood Gas:  02-13 @ 04:06  7.50/31/131/24/100.0/1.4  ABG lactate: --  Arterial Blood Gas:  02-12 @ 02:56  7.40/47/126/29/99.4/3.7  ABG lactate: --  Arterial Blood Gas:  02-11 @ 14:09  7.34/44/177/24/100.0/-2.0  ABG lactate: --      Mode: AC/ CMV (Assist Control/ Continuous Mandatory Ventilation), RR (machine): 16, TV (machine): 300, FiO2: 35, PEEP: 5, ITime: 1, MAP: 8, PIP: 23      COVID-19 PCR: NotDetec (09 Feb 2022 00:00)    Mode: AC/ CMV (Assist Control/ Continuous Mandatory Ventilation)  RR (machine): 16  TV (machine): 300  FiO2: 35  PEEP: 5  ITime: 1  MAP: 8  PIP: 23      ABG - ( 13 Feb 2022 04:06 )  pH, Arterial: 7.50  pH, Blood: x     /  pCO2: 31    /  pO2: 131   / HCO3: 24    / Base Excess: 1.4   /  SaO2: 100.0               RADIOLOGY: Today I personally interpreted the latest CXR and other pertinent films.

## 2022-02-13 NOTE — PROGRESS NOTE ADULT - ASSESSMENT
88y Female with PMH HTN presenting to the hospital [facility] after experiencing respiratory distress. Nephrology consulted for sodium >180 and evidence of JILL in setting of unknown baseline kidney function.     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  Recall as needed.

## 2022-02-14 LAB
ALBUMIN SERPL ELPH-MCNC: 1.7 G/DL — LOW (ref 3.5–5.2)
ALBUMIN SERPL ELPH-MCNC: 2.1 G/DL — LOW (ref 3.5–5.2)
ALP SERPL-CCNC: 124 U/L — HIGH (ref 30–115)
ALP SERPL-CCNC: 156 U/L — HIGH (ref 30–115)
ALT FLD-CCNC: 115 U/L — HIGH (ref 0–41)
ALT FLD-CCNC: 86 U/L — HIGH (ref 0–41)
ANION GAP SERPL CALC-SCNC: 11 MMOL/L — SIGNIFICANT CHANGE UP (ref 7–14)
ANION GAP SERPL CALC-SCNC: 8 MMOL/L — SIGNIFICANT CHANGE UP (ref 7–14)
AST SERPL-CCNC: 176 U/L — HIGH (ref 0–41)
AST SERPL-CCNC: 226 U/L — HIGH (ref 0–41)
BASOPHILS # BLD AUTO: 0.02 K/UL — SIGNIFICANT CHANGE UP (ref 0–0.2)
BASOPHILS NFR BLD AUTO: 0.1 % — SIGNIFICANT CHANGE UP (ref 0–1)
BILIRUB SERPL-MCNC: 0.8 MG/DL — SIGNIFICANT CHANGE UP (ref 0.2–1.2)
BILIRUB SERPL-MCNC: 1.6 MG/DL — HIGH (ref 0.2–1.2)
BUN SERPL-MCNC: 32 MG/DL — HIGH (ref 10–20)
BUN SERPL-MCNC: 34 MG/DL — HIGH (ref 10–20)
CALCIUM SERPL-MCNC: 5.6 MG/DL — CRITICAL LOW (ref 8.5–10.1)
CALCIUM SERPL-MCNC: 6.1 MG/DL — LOW (ref 8.5–10.1)
CHLORIDE SERPL-SCNC: 109 MMOL/L — SIGNIFICANT CHANGE UP (ref 98–110)
CHLORIDE SERPL-SCNC: 113 MMOL/L — HIGH (ref 98–110)
CO2 SERPL-SCNC: 23 MMOL/L — SIGNIFICANT CHANGE UP (ref 17–32)
CO2 SERPL-SCNC: 27 MMOL/L — SIGNIFICANT CHANGE UP (ref 17–32)
CREAT SERPL-MCNC: 0.6 MG/DL — LOW (ref 0.7–1.5)
CREAT SERPL-MCNC: 0.7 MG/DL — SIGNIFICANT CHANGE UP (ref 0.7–1.5)
CULTURE RESULTS: SIGNIFICANT CHANGE UP
CULTURE RESULTS: SIGNIFICANT CHANGE UP
EOSINOPHIL # BLD AUTO: 0 K/UL — SIGNIFICANT CHANGE UP (ref 0–0.7)
EOSINOPHIL NFR BLD AUTO: 0 % — SIGNIFICANT CHANGE UP (ref 0–8)
GAS PNL BLDA: SIGNIFICANT CHANGE UP
GLUCOSE BLDC GLUCOMTR-MCNC: 129 MG/DL — HIGH (ref 70–99)
GLUCOSE BLDC GLUCOMTR-MCNC: 132 MG/DL — HIGH (ref 70–99)
GLUCOSE BLDC GLUCOMTR-MCNC: 139 MG/DL — HIGH (ref 70–99)
GLUCOSE BLDC GLUCOMTR-MCNC: 152 MG/DL — HIGH (ref 70–99)
GLUCOSE BLDC GLUCOMTR-MCNC: 160 MG/DL — HIGH (ref 70–99)
GLUCOSE BLDC GLUCOMTR-MCNC: 184 MG/DL — HIGH (ref 70–99)
GLUCOSE BLDC GLUCOMTR-MCNC: 196 MG/DL — HIGH (ref 70–99)
GLUCOSE BLDC GLUCOMTR-MCNC: 94 MG/DL — SIGNIFICANT CHANGE UP (ref 70–99)
GLUCOSE SERPL-MCNC: 162 MG/DL — HIGH (ref 70–99)
GLUCOSE SERPL-MCNC: 166 MG/DL — HIGH (ref 70–99)
HCT VFR BLD CALC: 27.1 % — LOW (ref 37–47)
HGB BLD-MCNC: 9 G/DL — LOW (ref 12–16)
IMM GRANULOCYTES NFR BLD AUTO: 0.8 % — HIGH (ref 0.1–0.3)
LYMPHOCYTES # BLD AUTO: 0.48 K/UL — LOW (ref 1.2–3.4)
LYMPHOCYTES # BLD AUTO: 3 % — LOW (ref 20.5–51.1)
MAGNESIUM SERPL-MCNC: 1.1 MG/DL — LOW (ref 1.8–2.4)
MAGNESIUM SERPL-MCNC: 1.1 MG/DL — LOW (ref 1.8–2.4)
MAGNESIUM SERPL-MCNC: 1.7 MG/DL — LOW (ref 1.8–2.4)
MAGNESIUM SERPL-MCNC: 2.9 MG/DL — HIGH (ref 1.8–2.4)
MCHC RBC-ENTMCNC: 29 PG — SIGNIFICANT CHANGE UP (ref 27–31)
MCHC RBC-ENTMCNC: 33.2 G/DL — SIGNIFICANT CHANGE UP (ref 32–37)
MCV RBC AUTO: 87.4 FL — SIGNIFICANT CHANGE UP (ref 81–99)
MONOCYTES # BLD AUTO: 0.56 K/UL — SIGNIFICANT CHANGE UP (ref 0.1–0.6)
MONOCYTES NFR BLD AUTO: 3.5 % — SIGNIFICANT CHANGE UP (ref 1.7–9.3)
NEUTROPHILS # BLD AUTO: 14.67 K/UL — HIGH (ref 1.4–6.5)
NEUTROPHILS NFR BLD AUTO: 92.6 % — HIGH (ref 42.2–75.2)
NRBC # BLD: 0 /100 WBCS — SIGNIFICANT CHANGE UP (ref 0–0)
PHOSPHATE SERPL-MCNC: 1.9 MG/DL — LOW (ref 2.1–4.9)
PLATELET # BLD AUTO: 63 K/UL — LOW (ref 130–400)
POTASSIUM SERPL-MCNC: 3.3 MMOL/L — LOW (ref 3.5–5)
POTASSIUM SERPL-MCNC: 4.7 MMOL/L — SIGNIFICANT CHANGE UP (ref 3.5–5)
POTASSIUM SERPL-SCNC: 3.3 MMOL/L — LOW (ref 3.5–5)
POTASSIUM SERPL-SCNC: 4.7 MMOL/L — SIGNIFICANT CHANGE UP (ref 3.5–5)
PROT SERPL-MCNC: 4.3 G/DL — LOW (ref 6–8)
PROT SERPL-MCNC: 4.9 G/DL — LOW (ref 6–8)
RBC # BLD: 3.1 M/UL — LOW (ref 4.2–5.4)
RBC # FLD: 15.1 % — HIGH (ref 11.5–14.5)
SODIUM SERPL-SCNC: 144 MMOL/L — SIGNIFICANT CHANGE UP (ref 135–146)
SODIUM SERPL-SCNC: 147 MMOL/L — HIGH (ref 135–146)
SPECIMEN SOURCE: SIGNIFICANT CHANGE UP
SPECIMEN SOURCE: SIGNIFICANT CHANGE UP
WBC # BLD: 15.85 K/UL — HIGH (ref 4.8–10.8)
WBC # FLD AUTO: 15.85 K/UL — HIGH (ref 4.8–10.8)

## 2022-02-14 PROCEDURE — 93306 TTE W/DOPPLER COMPLETE: CPT | Mod: 26

## 2022-02-14 PROCEDURE — 99291 CRITICAL CARE FIRST HOUR: CPT

## 2022-02-14 PROCEDURE — 93010 ELECTROCARDIOGRAM REPORT: CPT | Mod: 76

## 2022-02-14 PROCEDURE — 71045 X-RAY EXAM CHEST 1 VIEW: CPT | Mod: 26,77

## 2022-02-14 PROCEDURE — 71045 X-RAY EXAM CHEST 1 VIEW: CPT | Mod: 26

## 2022-02-14 RX ORDER — POTASSIUM CHLORIDE 20 MEQ
20 PACKET (EA) ORAL ONCE
Refills: 0 | Status: DISCONTINUED | OUTPATIENT
Start: 2022-02-14 | End: 2022-02-17

## 2022-02-14 RX ORDER — CALCIUM GLUCONATE 100 MG/ML
1 VIAL (ML) INTRAVENOUS ONCE
Refills: 0 | Status: COMPLETED | OUTPATIENT
Start: 2022-02-14 | End: 2022-02-14

## 2022-02-14 RX ORDER — MAGNESIUM SULFATE 500 MG/ML
2 VIAL (ML) INJECTION ONCE
Refills: 0 | Status: COMPLETED | OUTPATIENT
Start: 2022-02-14 | End: 2022-02-14

## 2022-02-14 RX ORDER — PROPOFOL 10 MG/ML
10 INJECTION, EMULSION INTRAVENOUS
Qty: 500 | Refills: 0 | Status: DISCONTINUED | OUTPATIENT
Start: 2022-02-14 | End: 2022-02-15

## 2022-02-14 RX ORDER — DOXERCALCIFEROL 2.5 UG/1
2 CAPSULE ORAL
Refills: 0 | Status: DISCONTINUED | OUTPATIENT
Start: 2022-02-14 | End: 2022-02-14

## 2022-02-14 RX ORDER — POTASSIUM CHLORIDE 20 MEQ
40 PACKET (EA) ORAL ONCE
Refills: 0 | Status: COMPLETED | OUTPATIENT
Start: 2022-02-14 | End: 2022-02-14

## 2022-02-14 RX ORDER — CALCIUM GLUCONATE 100 MG/ML
1 VIAL (ML) INTRAVENOUS ONCE
Refills: 0 | Status: COMPLETED | OUTPATIENT
Start: 2022-02-14 | End: 2022-02-15

## 2022-02-14 RX ORDER — POTASSIUM CHLORIDE 20 MEQ
20 PACKET (EA) ORAL ONCE
Refills: 0 | Status: COMPLETED | OUTPATIENT
Start: 2022-02-14 | End: 2022-02-14

## 2022-02-14 RX ORDER — FENTANYL CITRATE 50 UG/ML
50 INJECTION INTRAVENOUS ONCE
Refills: 0 | Status: DISCONTINUED | OUTPATIENT
Start: 2022-02-14 | End: 2022-02-14

## 2022-02-14 RX ORDER — DOXERCALCIFEROL 2.5 UG/1
2 CAPSULE ORAL ONCE
Refills: 0 | Status: COMPLETED | OUTPATIENT
Start: 2022-02-14 | End: 2022-02-14

## 2022-02-14 RX ORDER — POTASSIUM PHOSPHATE, MONOBASIC POTASSIUM PHOSPHATE, DIBASIC 236; 224 MG/ML; MG/ML
30 INJECTION, SOLUTION INTRAVENOUS ONCE
Refills: 0 | Status: DISCONTINUED | OUTPATIENT
Start: 2022-02-14 | End: 2022-02-14

## 2022-02-14 RX ORDER — CALCIUM GLUCONATE 100 MG/ML
1 VIAL (ML) INTRAVENOUS
Refills: 0 | Status: DISCONTINUED | OUTPATIENT
Start: 2022-02-14 | End: 2022-02-14

## 2022-02-14 RX ORDER — POTASSIUM PHOSPHATE, MONOBASIC POTASSIUM PHOSPHATE, DIBASIC 236; 224 MG/ML; MG/ML
30 INJECTION, SOLUTION INTRAVENOUS ONCE
Refills: 0 | Status: COMPLETED | OUTPATIENT
Start: 2022-02-14 | End: 2022-02-14

## 2022-02-14 RX ORDER — CALCIUM GLUCONATE 100 MG/ML
1 VIAL (ML) INTRAVENOUS ONCE
Refills: 0 | Status: COMPLETED | OUTPATIENT
Start: 2022-02-15 | End: 2022-02-15

## 2022-02-14 RX ADMIN — Medication 60 MILLIGRAM(S): at 05:09

## 2022-02-14 RX ADMIN — DOXERCALCIFEROL 2 MICROGRAM(S): 2.5 CAPSULE ORAL at 20:35

## 2022-02-14 RX ADMIN — CHLORHEXIDINE GLUCONATE 15 MILLILITER(S): 213 SOLUTION TOPICAL at 17:52

## 2022-02-14 RX ADMIN — Medication 100 MILLIGRAM(S): at 05:08

## 2022-02-14 RX ADMIN — AMPICILLIN SODIUM AND SULBACTAM SODIUM 100 GRAM(S): 250; 125 INJECTION, POWDER, FOR SUSPENSION INTRAMUSCULAR; INTRAVENOUS at 05:08

## 2022-02-14 RX ADMIN — Medication 25 GRAM(S): at 08:20

## 2022-02-14 RX ADMIN — FENTANYL CITRATE 50 MICROGRAM(S): 50 INJECTION INTRAVENOUS at 15:15

## 2022-02-14 RX ADMIN — Medication 50 MILLIEQUIVALENT(S): at 10:29

## 2022-02-14 RX ADMIN — FENTANYL CITRATE 50 MICROGRAM(S): 50 INJECTION INTRAVENOUS at 14:48

## 2022-02-14 RX ADMIN — Medication 1 APPLICATION(S): at 11:09

## 2022-02-14 RX ADMIN — AMPICILLIN SODIUM AND SULBACTAM SODIUM 100 GRAM(S): 250; 125 INJECTION, POWDER, FOR SUSPENSION INTRAMUSCULAR; INTRAVENOUS at 17:53

## 2022-02-14 RX ADMIN — Medication 1 APPLICATION(S): at 17:28

## 2022-02-14 RX ADMIN — Medication 100 GRAM(S): at 17:28

## 2022-02-14 RX ADMIN — Medication 25 GRAM(S): at 07:23

## 2022-02-14 RX ADMIN — Medication 1 APPLICATION(S): at 06:06

## 2022-02-14 RX ADMIN — CHLORHEXIDINE GLUCONATE 15 MILLILITER(S): 213 SOLUTION TOPICAL at 05:09

## 2022-02-14 RX ADMIN — Medication 40 MILLIEQUIVALENT(S): at 06:46

## 2022-02-14 RX ADMIN — Medication 100 MILLIGRAM(S): at 17:52

## 2022-02-14 RX ADMIN — HEPARIN SODIUM 5000 UNIT(S): 5000 INJECTION INTRAVENOUS; SUBCUTANEOUS at 17:53

## 2022-02-14 RX ADMIN — POTASSIUM PHOSPHATE, MONOBASIC POTASSIUM PHOSPHATE, DIBASIC 83.33 MILLIMOLE(S): 236; 224 INJECTION, SOLUTION INTRAVENOUS at 17:53

## 2022-02-14 RX ADMIN — HEPARIN SODIUM 5000 UNIT(S): 5000 INJECTION INTRAVENOUS; SUBCUTANEOUS at 05:09

## 2022-02-14 RX ADMIN — CHLORHEXIDINE GLUCONATE 1 APPLICATION(S): 213 SOLUTION TOPICAL at 05:09

## 2022-02-14 NOTE — PROGRESS NOTE ADULT - ASSESSMENT
87 y/o F w/ PMH/o HTN (as per son at bedside) presenting to the hospital [facility] after experiencing respiratory distress. According to the patients family, the patient was being fed when she began choking and had difficulty breathing. Pt was bag masked by EMS and transported to the hospital. According to the family, the patient began to experience diffuse skin reaction across her entire body as well as ulcerations in her mouth for the past month. The family denied noticing fever or chills.    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

## 2022-02-14 NOTE — PROGRESS NOTE ADULT - SUBJECTIVE AND OBJECTIVE BOX
pt seen this morning, family at bedside, d/w RN and team  pt remains intubated, sedated  + episodes of torsades this morning  insulin drip running  abd soft, ND, tolerating feedings  multiple bullous lesions, many now dry and crusting  Vital Signs Last 24 Hrs  T(C): 37.2 (14 Feb 2022 18:00), Max: 37.2 (14 Feb 2022 18:00)  T(F): 99 (14 Feb 2022 18:00), Max: 99 (14 Feb 2022 18:00)  HR: 96 (14 Feb 2022 19:00) (47 - 96)  RR: 37 (14 Feb 2022 19:00) (13 - 37)  SpO2: 100% (14 Feb 2022 19:00) (99% - 100%)    I&O's Detail  13 Feb 2022 07:01  -  14 Feb 2022 07:00  IN:    Dexmedetomidine: 52 mL    Enteral Tube Flush: 1200 mL    Glucerna 1.5: 700 mL    Insulin: 49 mL    Norepinephrine: 72.8 mL    sodium chloride 0.9%: 700 mL  Total IN: 2773.8 mL  OUT:    Indwelling Catheter - Urethral (mL): 710 mL  Total OUT: 710 mL  Total NET: 2063.8 mL    MEDICATIONS  (STANDING):   ampicillin/sulbactam  IVPB 1.5 Gram(s) IV Intermittent every 12 hours  calcium gluconate IVPB 1 Gram(s) IV Intermittent once  chlorhexidine 0.12% Liquid 15 milliLiter(s) Oral Mucosa every 12 hours  chlorhexidine 4% Liquid 1 Application(s) Topical <User Schedule>  collagenase Ointment 1 Application(s) Topical two times a day  doxercalciferol Injectable 2 MICROGram(s) IV Push once  doxycycline hyclate Capsule 100 milliGRAM(s) Oral every 12 hours  fentaNYL   Infusion. 0.5 MICROgram(s)/kG/Hr (2.25 mL/Hr) IV Continuous <Continuous>  heparin   Injectable 5000 Unit(s) SubCutaneous every 12 hours  influenza  Vaccine (HIGH DOSE) 0.7 milliLiter(s) IntraMuscular once  insulin regular Infusion 1 Unit(s)/Hr (1 mL/Hr) IV Continuous <Continuous>  norepinephrine Infusion 0.05 MICROgram(s)/kG/Min (4.22 mL/Hr) IV Continuous <Continuous>  potassium chloride   Powder 20 milliEquivalent(s) Oral once  predniSONE   Tablet 60 milliGRAM(s) Oral daily  vitamin A &amp; D Ointment 1 Application(s) Topical daily                        9.0    15.85 )-----------( 63       ( 14 Feb 2022 04:45 )             27.1   02-14    147<H>  |  113<H>  |  34<H>  ----------------------------<  162<H>  4.7   |  23  |  0.7    Ca    6.1<L>      14 Feb 2022 14:02  Phos  1.9     02-14  Mg     1.1     02-14    TPro  4.9<L>  /  Alb  2.1<L>  /  TBili  1.6<H>  /  DBili  x   /  AST  226<H>  /  ALT  115<H>  /  AlkPhos  156<H>  02-14

## 2022-02-14 NOTE — PROGRESS NOTE ADULT - ASSESSMENT
IMP:  - acute resp failure r/t aspiration  - JILL - baseline unknown, improving  - met acidosis  - bullous pemphigoid  - thrombocytopenia  - hypernatremia improved  - hypophosphatemia & hypomagnesemia - riders given  - marked hyperglycemia - no home DM meds noted, HbA1c 6.9 - treated and improving    - est REE by , by IJE 1008 - higher if derm lesions calculated as one would a burn  - est protein needs > 65 gm/d (will f/u progress, may need UUN study)  - agree with Glucerna 1.2, for now, but at 35 ml/h  - add Beneprotein 3 packets/d (specifically whey source) - need to document that these are given       This --> 68 gm protein & 1073 kcal/d  - f/u phos level with am labs, monitor dosing r/t pt's small stature and weight

## 2022-02-14 NOTE — PROGRESS NOTE ADULT - SUBJECTIVE AND OBJECTIVE BOX
Nephrology progress note    Patient is seen and examined, events over the last 24 h noted .  Bradycardic 47 , on Levo  had Torsades on Mag riser #2  Allergies:  No Known Allergies    Hospital Medications:   MEDICATIONS  (STANDING):  ampicillin/sulbactam  IVPB      ampicillin/sulbactam  IVPB 1.5 Gram(s) IV Intermittent every 12 hours  chlorhexidine 0.12% Liquid 15 milliLiter(s) Oral Mucosa every 12 hours  chlorhexidine 4% Liquid 1 Application(s) Topical <User Schedule>  collagenase Ointment 1 Application(s) Topical two times a day  dexMEDEtomidine Infusion 0.2 MICROgram(s)/kG/Hr (1.92 mL/Hr) IV Continuous <Continuous>  doxycycline hyclate Capsule 100 milliGRAM(s) Oral every 12 hours  fentaNYL   Infusion. 0.5 MICROgram(s)/kG/Hr (2.25 mL/Hr) IV Continuous <Continuous>  heparin   Injectable 5000 Unit(s) SubCutaneous every 12 hours  influenza  Vaccine (HIGH DOSE) 0.7 milliLiter(s) IntraMuscular once  insulin regular Infusion 1 Unit(s)/Hr (1 mL/Hr) IV Continuous <Continuous>  norepinephrine Infusion 0.05 MICROgram(s)/kG/Min (4.22 mL/Hr) IV Continuous <Continuous>  potassium chloride   Powder 20 milliEquivalent(s) Oral once  predniSONE   Tablet 60 milliGRAM(s) Oral daily  vitamin A &amp; D Ointment 1 Application(s) Topical daily        VITALS:  T(F): 94.1 (22 @ 08:00), Max: 97.3 (22 @ 12:00)  HR: 66 (22 @ 10:00)  BP: 127/50  RR: 20 (22 @ 10:00)  SpO2: 100% (22 @ 10:00)  Wt(kg): --     @ 07:01  -   @ 07:00  --------------------------------------------------------  IN: 4383.3 mL / OUT: 1610 mL / NET: 2773.3 mL     @ 07: @ 07:00  --------------------------------------------------------  IN: 2773.8 mL / OUT: 710 mL / NET: 2063.8 mL     @ 07: @ 11:11  --------------------------------------------------------  IN: 109.5 mL / OUT: 350 mL / NET: -240.5 mL          PHYSICAL EXAM:  Constitutional: On vent  Neck: No JVD  Respiratory: CTA  Cardiovascular: S1, S2, RRR  Gastrointestinal: BS+, soft, NT/ND  Extremities: No peripheral edema  Neurological: Sedated  : + nesbitt.   Skin: No rashes  Vascular Access:    LABS:      144  |  109  |  32<H>  ----------------------------<  166<H>  3.3<L>   |  27  |  0.6<L>    Ca    5.6<LL>      2022 04:45  Phos  1.9       Mg     1.7         TPro  4.3<L>  /  Alb  1.7<L>  /  TBili  0.8  /  DBili      /  AST  176<H>  /  ALT  86<H>  /  AlkPhos  124<H>                            9.0    15.85 )-----------( 63       ( 2022 04:45 )             27.1       Urine Studies:  Urinalysis Basic - ( 2022 00:00 )    Color: Yellow / Appearance: Slightly Turbid / S.026 / pH:   Gluc:  / Ketone: Trace  / Bili: Negative / Urobili: <2 mg/dL   Blood:  / Protein: 30 mg/dL / Nitrite: Negative   Leuk Esterase: Negative / RBC: 5 /HPF / WBC 3 /HPF   Sq Epi:  / Non Sq Epi: 2 /HPF / Bacteria: Negative      Sodium, Random Urine: 45.0 mmoL/L ( @ 18:52)  Osmolality, Random Urine: 598 mos/kg ( @ 18:52)  Creatinine, Random Urine: 116 mg/dL ( @ 18:52)    RADIOLOGY & ADDITIONAL STUDIES:  < from: Xray Chest 1 View- PORTABLE-Routine (Xray Chest 1 View- PORTABLE-Routine in AM.) (22 @ 06:35) >  Impression:  Stable bibasilar opacities.    Lines and support devices as described above.    < end of copied text >

## 2022-02-14 NOTE — PROGRESS NOTE ADULT - ASSESSMENT
88y Female with PMH HTN presenting to the hospital [facility] after experiencing respiratory distress. Nephrology consulted for sodium >180 and evidence of JILL in setting of unknown baseline kidney function.     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 Nephrology consulted for sodium >180 and evidence of JILL in setting of unknown baseline kidney function.     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

## 2022-02-14 NOTE — CONSULT NOTE ADULT - SUBJECTIVE AND OBJECTIVE BOX
Cardiologist:    HPI:  89 y/o F w/ PMH/o HTN (as per son at bedside) presenting to the hospital BIBA after experiencing respiratory distress. According to the patients family, the patient was being fed when she began choking and had difficulty breathing. Pt was bag masked by EMS and transported to the hospital. According to the family, the patient began to experience diffuse skin reaction across her entire body as well as ulcerations in her mouth for the past month. The family denied noticing fever or chills.    In the ED, patient was tachypneic and hypoxic,  intubated for airway protection. Pt was found to hypothermic, started on warming blanket. Pt started on IV levophed. S/p IV vanc/ cefepime. called to evaluate         (09 Feb 2022 04:51)      PAST MEDICAL & SURGICAL HISTORY      FAMILY HISTORY:  FAMILY HISTORY:    [ ] no pertinent family history of premature cardiovascular disease in first degree relatives.  Mother:   Father:   Siblings:     SOCIAL HISTORY:  []smoker  []Alcohol  []Drug    ALLERGIES:  No Known Allergies      MEDICATIONS:  MEDICATIONS  (STANDING):  ampicillin/sulbactam  IVPB      ampicillin/sulbactam  IVPB 1.5 Gram(s) IV Intermittent every 12 hours  chlorhexidine 0.12% Liquid 15 milliLiter(s) Oral Mucosa every 12 hours  chlorhexidine 4% Liquid 1 Application(s) Topical <User Schedule>  collagenase Ointment 1 Application(s) Topical two times a day  dexMEDEtomidine Infusion 0.2 MICROgram(s)/kG/Hr (1.92 mL/Hr) IV Continuous <Continuous>  doxycycline hyclate Capsule 100 milliGRAM(s) Oral every 12 hours  fentaNYL   Infusion. 0.5 MICROgram(s)/kG/Hr (2.25 mL/Hr) IV Continuous <Continuous>  heparin   Injectable 5000 Unit(s) SubCutaneous every 12 hours  influenza  Vaccine (HIGH DOSE) 0.7 milliLiter(s) IntraMuscular once  insulin regular Infusion 1 Unit(s)/Hr (1 mL/Hr) IV Continuous <Continuous>  norepinephrine Infusion 0.05 MICROgram(s)/kG/Min (4.22 mL/Hr) IV Continuous <Continuous>  potassium chloride   Powder 20 milliEquivalent(s) Oral once  predniSONE   Tablet 60 milliGRAM(s) Oral daily  vitamin A &amp; D Ointment 1 Application(s) Topical daily    MEDICATIONS  (PRN):      HOME MEDICATIONS:  Home Medications:      VITALS:   T(F): 94.1 (02-14 @ 08:00), Max: 98.4 (02-11 @ 04:00)  HR: 47 (02-14 @ 08:00) (47 - 101)  BP: 168/79 (02-13 @ 10:00) (83/44 - 168/79)  BP(mean): 113 (02-13 @ 10:00) (58 - 113)  RR: 13 (02-14 @ 08:00) (3 - 23)  SpO2: 100% (02-14 @ 08:00) (93% - 100%)    I&O's Summary    13 Feb 2022 07:01  -  14 Feb 2022 07:00  --------------------------------------------------------  IN: 2773.8 mL / OUT: 710 mL / NET: 2063.8 mL    14 Feb 2022 07:01  -  14 Feb 2022 09:55  --------------------------------------------------------  IN: 37.5 mL / OUT: 200 mL / NET: -162.5 mL        REVIEW OF SYSTEMS:  CONSTITUTIONAL: No weakness, fevers or chills  EYES: No visual changes  ENT: No vertigo or throat pain   NECK: No pain or stiffness  RESPIRATORY: No cough, wheezing, hemoptysis; No shortness of breath  CARDIOVASCULAR: No chest pain or palpitations  GASTROINTESTINAL: No abdominal or epigastric pain. No nausea, vomiting, or hematemesis; No diarrhea or constipation. No melena or hematochezia.  GENITOURINARY: No dysuria, frequency or hematuria  NEUROLOGICAL: No numbness or weakness  SKIN: No itching, no rashes  MSK: FROM x4    PHYSICAL EXAM:  NEURO: patient is awake , alert and oriented  GEN: Not in acute distress  NECK: no thyroid enlargement, no JVD  LUNGS: Clear to auscultation bilaterally   CARDIOVASCULAR: S1/S2 present, RRR , no murmurs or rubs, no carotid bruits,  + PP bilaterally  ABD: Soft, non-tender, non-distended, +BS  EXT: No ROSA  SKIN: Intact    LABS:                        9.0    15.85 )-----------( 63       ( 14 Feb 2022 04:45 )             27.1     02-14    144  |  109  |  32<H>  ----------------------------<  166<H>  3.3<L>   |  27  |  0.6<L>    Ca    5.6<LL>      14 Feb 2022 04:45  Phos  1.9     02-14  Mg     1.7     02-14    TPro  4.3<L>  /  Alb  1.7<L>  /  TBili  0.8  /  DBili  x   /  AST  176<H>  /  ALT  86<H>  /  AlkPhos  124<H>  02-14      02-10 Chol 83 LDL -- HDL 14<L> Trig 203<H>      RADIOLOGY:  -CXR:  < from: Xray Chest 1 View- PORTABLE-Routine (Xray Chest 1 View- PORTABLE-Routine in AM.) (02.14.22 @ 06:35) >  Impression:  Stable bibasilar opacities.    < end of copied text >    -TTE:  -CCTA:  -STRESS TEST:  -CATHETERIZATION:    ECG:  < from: 12 Lead ECG (02.09.22 @ 02:41) >    Ventricular Rate 62 BPM    Atrial Rate 62 BPM    P-R Interval 146 ms    QRS Duration 90 ms    Q-T Interval 462 ms    QTC Calculation(Bazett) 468 ms    P Axis 62 degrees    R Axis 33 degrees    T Axis 18 degrees    Diagnosis Line Normal sinus rhythm  Nonspecific T wave abnormality  Abnormal ECG      < end of copied text >      TELEMETRY EVENTS:   Cardiologist:    HPI:  87 y/o F w/ PMH/o HTN (as per son at bedside) presenting to the hospital BIBA after experiencing respiratory distress. According to the patients family, the patient was being fed when she began choking and had difficulty breathing. Pt was bag masked by EMS and transported to the hospital. According to the family, the patient began to experience diffuse skin reaction across her entire body as well as ulcerations in her mouth for the past month. The family denied noticing fever or chills.    In the ED, patient was tachypneic and hypoxic,  intubated for airway protection. Pt was found to hypothermic, started on warming blanket. Pt started on IV levophed. S/p IV vanc/ cefepime. called to evaluate for evaluation of an event of torsade do point on tele          (09 Feb 2022 04:51)          ALLERGIES:  No Known Allergies      MEDICATIONS:  MEDICATIONS  (STANDING):  ampicillin/sulbactam  IVPB      ampicillin/sulbactam  IVPB 1.5 Gram(s) IV Intermittent every 12 hours  chlorhexidine 0.12% Liquid 15 milliLiter(s) Oral Mucosa every 12 hours  chlorhexidine 4% Liquid 1 Application(s) Topical <User Schedule>  collagenase Ointment 1 Application(s) Topical two times a day  dexMEDEtomidine Infusion 0.2 MICROgram(s)/kG/Hr (1.92 mL/Hr) IV Continuous <Continuous>  doxycycline hyclate Capsule 100 milliGRAM(s) Oral every 12 hours  fentaNYL   Infusion. 0.5 MICROgram(s)/kG/Hr (2.25 mL/Hr) IV Continuous <Continuous>  heparin   Injectable 5000 Unit(s) SubCutaneous every 12 hours  influenza  Vaccine (HIGH DOSE) 0.7 milliLiter(s) IntraMuscular once  insulin regular Infusion 1 Unit(s)/Hr (1 mL/Hr) IV Continuous <Continuous>  norepinephrine Infusion 0.05 MICROgram(s)/kG/Min (4.22 mL/Hr) IV Continuous <Continuous>  potassium chloride   Powder 20 milliEquivalent(s) Oral once  predniSONE   Tablet 60 milliGRAM(s) Oral daily  vitamin A &amp; D Ointment 1 Application(s) Topical daily    MEDICATIONS  (PRN):      HOME MEDICATIONS:  Home Medications:      VITALS:   T(F): 94.1 (02-14 @ 08:00), Max: 98.4 (02-11 @ 04:00)  HR: 47 (02-14 @ 08:00) (47 - 101)  BP: 168/79 (02-13 @ 10:00) (83/44 - 168/79)  BP(mean): 113 (02-13 @ 10:00) (58 - 113)  RR: 13 (02-14 @ 08:00) (3 - 23)  SpO2: 100% (02-14 @ 08:00) (93% - 100%)    I&O's Summary    13 Feb 2022 07:01  -  14 Feb 2022 07:00  --------------------------------------------------------  IN: 2773.8 mL / OUT: 710 mL / NET: 2063.8 mL    14 Feb 2022 07:01  -  14 Feb 2022 09:55  --------------------------------------------------------  IN: 37.5 mL / OUT: 200 mL / NET: -162.5 mL        REVIEW OF SYSTEMS:  CONSTITUTIONAL: No weakness, fevers or chills  EYES: No visual changes  ENT: No vertigo or throat pain   NECK: No pain or stiffness  RESPIRATORY: No cough, wheezing, hemoptysis; No shortness of breath  CARDIOVASCULAR: No chest pain or palpitations  GASTROINTESTINAL: No abdominal or epigastric pain. No nausea, vomiting, or hematemesis; No diarrhea or constipation. No melena or hematochezia.  GENITOURINARY: No dysuria, frequency or hematuria  NEUROLOGICAL: No numbness or weakness  SKIN: No itching, no rashes  MSK: FROM x4    PHYSICAL EXAM:  NEURO: patient is awake , alert and oriented  GEN: Not in acute distress  NECK: no thyroid enlargement, no JVD  LUNGS: Clear to auscultation bilaterally   CARDIOVASCULAR: S1/S2 present, RRR , no murmurs or rubs, no carotid bruits,  + PP bilaterally  ABD: Soft, non-tender, non-distended, +BS  EXT: No ROSA  SKIN: Intact    LABS:                        9.0    15.85 )-----------( 63       ( 14 Feb 2022 04:45 )             27.1     02-14    144  |  109  |  32<H>  ----------------------------<  166<H>  3.3<L>   |  27  |  0.6<L>    Ca    5.6<LL>      14 Feb 2022 04:45  Phos  1.9     02-14  Mg     1.7     02-14    TPro  4.3<L>  /  Alb  1.7<L>  /  TBili  0.8  /  DBili  x   /  AST  176<H>  /  ALT  86<H>  /  AlkPhos  124<H>  02-14      02-10 Chol 83 LDL -- HDL 14<L> Trig 203<H>      RADIOLOGY:  -CXR:  < from: Xray Chest 1 View- PORTABLE-Routine (Xray Chest 1 View- PORTABLE-Routine in AM.) (02.14.22 @ 06:35) >  Impression:  Stable bibasilar opacities.    < end of copied text >    -TTE:  -CCTA:  -STRESS TEST:  -CATHETERIZATION:    ECG:  < from: 12 Lead ECG (02.09.22 @ 02:41) >    Ventricular Rate 62 BPM    Atrial Rate 62 BPM    P-R Interval 146 ms    QRS Duration 90 ms    Q-T Interval 462 ms    QTC Calculation(Bazett) 468 ms    P Axis 62 degrees    R Axis 33 degrees    T Axis 18 degrees    Diagnosis Line Normal sinus rhythm  Nonspecific T wave abnormality  Abnormal ECG      < end of copied text >

## 2022-02-14 NOTE — CONSULT NOTE ADULT - ASSESSMENT
Torsades de pointes  Acute Hypoxic Respiratory Failure 2/2 aspiration  Severe hypernatremia  HT1: Mild Hypothermia  Severely reduced kidney function (Possible prerenal JILL, baseline Cr unknown)  HTN  NAGMA    -replete Mg  -keep K>4 and Mg>2  -repeat BMP and Mg in afternoon  -repeat EKG  -avoid QT prolonging agents  -get TTE  -avoid hypoxia Torsades de pointes  Acute Hypoxic Respiratory Failure 2/2 aspiration  Severe hypernatremia  HT1: Mild Hypothermia  Severely reduced kidney function (Possible prerenal JILL, baseline Cr unknown)  HTN  NAGMA

## 2022-02-14 NOTE — PROGRESS NOTE ADULT - ASSESSMENT
IMPRESSION:  Acute Hypoxic Respiratory Failure 2/2 aspiration  Severe hypernatremia  HT1: Mild Hypothermia  Severely reduced kidney function (Possible prerenal JILL, baseline Cr unknown)  HTN? History limited  bullous pemphigoid  NAGMA  torsade   PLAN:    CNS: fentanyl for sedation,  wean off as tolerated  SAT  old precedex for bradycardia if fentanyl     HEENT: Oral care    PULMONARY:  repeat cxr now to evaluate right apical   SBT when more awake and stable   continue O2 as necessary to maintain sats > 90%<94  HOB @ 45 degrees.    Aspiration precautions.   daily  ABG/ CXR,   decrease Mve    CARDIOVASCULAR:  taper pressors for map 65   EKG   cardiology eval consult for torsade   replace K, MG     GI: GI prophylaxis.  Bowel regimen   NG feed     RENAL:    d/c IVF  supplement lytes.    Correct as needed.  renal management    INFECTIOUS DISEASE:  abx per ID  Follow up cultures.  follow burn     HEMATOLOGICAL:  DVT prophylaxis.  steroids for pemphigoid trial Doxycycline 100 Q12  Derm F/U    ENDOCRINE:  Follow up FS. SS. A1c. .

## 2022-02-14 NOTE — PROGRESS NOTE ADULT - SUBJECTIVE AND OBJECTIVE BOX
ARI MA, 88y, Female; MRN# [id number]  Hospital Stay: 5d.    Patient is a 88y old Female who presents with a chief complaint of respiratory failure (11 Feb 2022 22:40)  Currently admitted to the ICU with the primary diagnosis of Acute respiratory failure with hypoxia      SUBJECTIVE:  Interval/Overnight events: No overnight events. This morning, pt went in and out of torsades, BP remained stable. Electrolytes repleted. Will follow up 11am BMP.    REVIEW OF SYSTEMS:  As per HPI  OBJECTIVE:  VITALS:  T(F): 94.1 (02-14-22 @ 08:00), Max: 97.3 (02-13-22 @ 12:00)  HR: 66 (02-14-22 @ 10:00) (47 - 91)  BP: --  ABP: 127/50 (02-14-22 @ 10:00) (117/43 - 247/84)  ABP(mean): 77 (02-14-22 @ 10:00) (67 - 144)  RR: 20 (02-14-22 @ 10:00) (13 - 22)  SpO2: 100% (02-14-22 @ 10:00) (100% - 100%)    Vent Settings  Device: Avea, Mode: AC/ CMV (Assist Control/ Continuous Mandatory Ventilation), RR (machine): 12, TV (machine): 300, FiO2: 30, PEEP: 5, ITime: 1, MAP: 8, PIP: 16  Blood Gas  02-14-22 @ 03:15  pH 7.44 | pCO2 40 | pO2 117 | HCO3 27 | O2 Sat 99.8  02-13-22 @ 13:04  pH 7.54 | pCO2 31 | pO2 156 | HCO3 26 | O2 Sat 99.8    Drips  dexMEDEtomidine Infusion 0.2 MICROgram(s)/kG/Hr (1.92 mL/Hr) IV Continuous <Continuous>  fentaNYL   Infusion. 0.5 MICROgram(s)/kG/Hr (2.25 mL/Hr) IV Continuous <Continuous>  insulin regular Infusion 1 Unit(s)/Hr (1 mL/Hr) IV Continuous <Continuous>  norepinephrine Infusion 0.05 MICROgram(s)/kG/Min (4.22 mL/Hr) IV Continuous <Continuous>    I&Os:    02-13-22 @ 07:01  -  02-14-22 @ 07:00  --------------------------------------------------------  IN: 2773.8 mL / OUT: 710 mL / NET: 2063.8 mL    02-14-22 @ 07:01  -  02-14-22 @ 11:38  --------------------------------------------------------  IN: 109.5 mL / OUT: 350 mL / NET: -240.5 mL      PHYSICAL EXAM:  CONST: well appearing for age  NECK:  supple  CARDIAC:  bradycardic  RESP:  respiratory rate and effort appear normal for age; lungs are clear to auscultation bilaterally  ABDOMEN:  soft, nontender  MUSCULOSKELETAL/NEURO:  sedated  SKIN:  + diffuse bullae    ACTIVE MEDICATIONS:  Standing  ampicillin/sulbactam  IVPB      ampicillin/sulbactam  IVPB 1.5 Gram(s) IV Intermittent every 12 hours  calcium gluconate IVPB 1 Gram(s) IV Intermittent <User Schedule>  chlorhexidine 0.12% Liquid 15 milliLiter(s) Oral Mucosa every 12 hours  chlorhexidine 4% Liquid 1 Application(s) Topical <User Schedule>  collagenase Ointment 1 Application(s) Topical two times a day  dexMEDEtomidine Infusion 0.2 MICROgram(s)/kG/Hr (1.92 mL/Hr) IV Continuous <Continuous>  doxercalciferol Injectable 2 MICROGram(s) IV Push <User Schedule>  doxycycline hyclate Capsule 100 milliGRAM(s) Oral every 12 hours  fentaNYL   Infusion. 0.5 MICROgram(s)/kG/Hr (2.25 mL/Hr) IV Continuous <Continuous>  heparin   Injectable 5000 Unit(s) SubCutaneous every 12 hours  influenza  Vaccine (HIGH DOSE) 0.7 milliLiter(s) IntraMuscular once  insulin regular Infusion 1 Unit(s)/Hr (1 mL/Hr) IV Continuous <Continuous>  norepinephrine Infusion 0.05 MICROgram(s)/kG/Min (4.22 mL/Hr) IV Continuous <Continuous>  potassium chloride   Powder 20 milliEquivalent(s) Oral once  potassium phosphate IVPB 30 milliMole(s) IV Intermittent once  predniSONE   Tablet 60 milliGRAM(s) Oral daily  vitamin A &amp; D Ointment 1 Application(s) Topical daily    PRN    LABS:  02-14-22 @ 04:45  WBC 15.85<H>, H/H 9.0<L>/27.1<L>, plt 63<L>  Na 144, K 3.3<L>, Cl 109, CO2 27, BUN 32<H>, Cr 0.6<L>, Glu 166<H>    Ca 5.6<LL>, Mg 1.7<L>, Phosphorus 1.9<L>    Tot Protein 4.3<L>, Alb 1.7<L>, T. Bili 0.8, D. Bili --  <H>, ALT 86<H>, Alk phos 124<H>    02-13-22 @ 19:56  WBC --, H/H --/--, plt --  Na 142, K 3.5, Cl 108, CO2 25, BUN 32<H>, Cr 0.7, Glu 116<H>    Ca 5.8<LL>, Mg --, Phosphorus --    Tot Protein --, Alb --, T. Bili --, D. Bili --  AST --, ALT --, Alk phos --    02-13-22 @ 16:00  WBC --, H/H --/--, plt --  Na 140, K 3.5, Cl 107, CO2 25, BUN 33<H>, Cr 0.7, Glu 164<H>    Ca 5.7<LL>, Mg --, Phosphorus --    Tot Protein 4.1<L>, Alb 1.6<L>, T. Bili 0.8, D. Bili --  <H>, ALT 79<H>, Alk phos 127<H>      02-10-22 @ 04:30:  TSH 1.39      02-10-22 @ 04:30:  Hgb A1c 6.9<H>% | Mean plasma glucose 151<H>    02-10-22 @ 04:30:  Total Chol 83 | Non-HDL 69 | HDL 14<L>  LDL-Direct -- | LDL-Calc 19 | <H>    02-10-22 @ 04:30:  Troponin T 0.13<HH>  02-09-22 @ 17:41:  Troponin T 0.12<HH>  02-08-22 @ 23:55:  Troponin T 0.10<HH>    02-09-22 @ 00:00:  COVID-19 PCR: NotDetec    CULTURES:    Culture - Sputum (collected 02-09-22 @ 18:42)  Source: .Sputum Sputum  Gram Stain (02-10-22 @ 07:17):    Moderate polymorphonuclear leukocytes per low power field    No Squamous epithelial cells per low power field    No organisms seen per oil power field  Final Report (02-12-22 @ 15:04):    Rare Pseudomonas aeruginosa    Normal Respiratory Candida absent  Organism: Pseudomonas aeruginosa (02-12-22 @ 15:04)  Organism: Pseudomonas aeruginosa (02-12-22 @ 15:04)      -  Amikacin: S <=16      -  Aztreonam: S <=4      -  Cefepime: S 4      -  Ceftazidime: S 4      -  Ciprofloxacin: S <=0.25      -  Gentamicin: S <=2      -  Imipenem: S <=1      -  Levofloxacin: S <=0.5      -  Meropenem: S <=1      -  Piperacillin/Tazobactam: S <=8      -  Tobramycin: S <=2      Method Type: MARISELA    Culture - Urine (collected 02-09-22 @ 00:00)  Source: Clean Catch Clean Catch (Midstream)  Final Report (02-10-22 @ 06:28):    No growth    Culture - Blood (collected 02-08-22 @ 23:55)  Source: .Blood Blood  Final Report (02-14-22 @ 07:00):    No Growth Final    Culture - Blood (collected 02-08-22 @ 23:55)  Source: .Blood Blood  Final Report (02-14-22 @ 07:00):    No Growth Final    IMAGING:  < from: Xray Chest 1 View- PORTABLE-Routine (Xray Chest 1 View- PORTABLE-Routine in AM.) (02.14.22 @ 06:35) >    Impression:  Stable bibasilar opacities.    Lines and support devices as described above.    < end of copied text >

## 2022-02-15 LAB
24R-OH-CALCIDIOL SERPL-MCNC: 6 NG/ML — LOW (ref 30–80)
ALBUMIN SERPL ELPH-MCNC: 1.7 G/DL — LOW (ref 3.5–5.2)
ALBUMIN SERPL ELPH-MCNC: 1.9 G/DL — LOW (ref 3.5–5.2)
ALP SERPL-CCNC: 151 U/L — HIGH (ref 30–115)
ALP SERPL-CCNC: 161 U/L — HIGH (ref 30–115)
ALT FLD-CCNC: 109 U/L — HIGH (ref 0–41)
ALT FLD-CCNC: 111 U/L — HIGH (ref 0–41)
ANION GAP SERPL CALC-SCNC: 12 MMOL/L — SIGNIFICANT CHANGE UP (ref 7–14)
ANION GAP SERPL CALC-SCNC: 12 MMOL/L — SIGNIFICANT CHANGE UP (ref 7–14)
ANION GAP SERPL CALC-SCNC: 13 MMOL/L — SIGNIFICANT CHANGE UP (ref 7–14)
AST SERPL-CCNC: 183 U/L — HIGH (ref 0–41)
AST SERPL-CCNC: 185 U/L — HIGH (ref 0–41)
BASOPHILS # BLD AUTO: 0.02 K/UL — SIGNIFICANT CHANGE UP (ref 0–0.2)
BASOPHILS NFR BLD AUTO: 0.1 % — SIGNIFICANT CHANGE UP (ref 0–1)
BILIRUB SERPL-MCNC: 1.2 MG/DL — SIGNIFICANT CHANGE UP (ref 0.2–1.2)
BILIRUB SERPL-MCNC: 1.3 MG/DL — HIGH (ref 0.2–1.2)
BUN SERPL-MCNC: 33 MG/DL — HIGH (ref 10–20)
BUN SERPL-MCNC: 34 MG/DL — HIGH (ref 10–20)
BUN SERPL-MCNC: 35 MG/DL — HIGH (ref 10–20)
CALCIUM SERPL-MCNC: 6.1 MG/DL — LOW (ref 8.5–10.1)
CALCIUM SERPL-MCNC: 6.2 MG/DL — LOW (ref 8.5–10.1)
CALCIUM SERPL-MCNC: 6.5 MG/DL — CRITICAL LOW (ref 8.4–10.5)
CALCIUM SERPL-MCNC: 6.5 MG/DL — LOW (ref 8.5–10.1)
CHLORIDE SERPL-SCNC: 109 MMOL/L — SIGNIFICANT CHANGE UP (ref 98–110)
CHLORIDE SERPL-SCNC: 109 MMOL/L — SIGNIFICANT CHANGE UP (ref 98–110)
CHLORIDE SERPL-SCNC: 111 MMOL/L — HIGH (ref 98–110)
CO2 SERPL-SCNC: 23 MMOL/L — SIGNIFICANT CHANGE UP (ref 17–32)
CREAT SERPL-MCNC: 0.7 MG/DL — SIGNIFICANT CHANGE UP (ref 0.7–1.5)
EOSINOPHIL # BLD AUTO: 0 K/UL — SIGNIFICANT CHANGE UP (ref 0–0.7)
EOSINOPHIL NFR BLD AUTO: 0 % — SIGNIFICANT CHANGE UP (ref 0–8)
GLUCOSE BLDC GLUCOMTR-MCNC: 127 MG/DL — HIGH (ref 70–99)
GLUCOSE BLDC GLUCOMTR-MCNC: 128 MG/DL — HIGH (ref 70–99)
GLUCOSE BLDC GLUCOMTR-MCNC: 134 MG/DL — HIGH (ref 70–99)
GLUCOSE BLDC GLUCOMTR-MCNC: 139 MG/DL — HIGH (ref 70–99)
GLUCOSE BLDC GLUCOMTR-MCNC: 156 MG/DL — HIGH (ref 70–99)
GLUCOSE BLDC GLUCOMTR-MCNC: 190 MG/DL — HIGH (ref 70–99)
GLUCOSE BLDC GLUCOMTR-MCNC: 248 MG/DL — HIGH (ref 70–99)
GLUCOSE SERPL-MCNC: 121 MG/DL — HIGH (ref 70–99)
GLUCOSE SERPL-MCNC: 160 MG/DL — HIGH (ref 70–99)
GLUCOSE SERPL-MCNC: 226 MG/DL — HIGH (ref 70–99)
HCT VFR BLD CALC: 25.9 % — LOW (ref 37–47)
HGB BLD-MCNC: 8.5 G/DL — LOW (ref 12–16)
IMM GRANULOCYTES NFR BLD AUTO: 1.3 % — HIGH (ref 0.1–0.3)
LYMPHOCYTES # BLD AUTO: 0.88 K/UL — LOW (ref 1.2–3.4)
LYMPHOCYTES # BLD AUTO: 6.2 % — LOW (ref 20.5–51.1)
MAGNESIUM SERPL-MCNC: 2.1 MG/DL — SIGNIFICANT CHANGE UP (ref 1.8–2.4)
MAGNESIUM SERPL-MCNC: 2.5 MG/DL — HIGH (ref 1.8–2.4)
MAGNESIUM SERPL-MCNC: 2.5 MG/DL — HIGH (ref 1.8–2.4)
MCHC RBC-ENTMCNC: 29 PG — SIGNIFICANT CHANGE UP (ref 27–31)
MCHC RBC-ENTMCNC: 32.8 G/DL — SIGNIFICANT CHANGE UP (ref 32–37)
MCV RBC AUTO: 88.4 FL — SIGNIFICANT CHANGE UP (ref 81–99)
MONOCYTES # BLD AUTO: 0.74 K/UL — HIGH (ref 0.1–0.6)
MONOCYTES NFR BLD AUTO: 5.2 % — SIGNIFICANT CHANGE UP (ref 1.7–9.3)
NEUTROPHILS # BLD AUTO: 12.4 K/UL — HIGH (ref 1.4–6.5)
NEUTROPHILS NFR BLD AUTO: 87.2 % — HIGH (ref 42.2–75.2)
NRBC # BLD: 0 /100 WBCS — SIGNIFICANT CHANGE UP (ref 0–0)
PHOSPHATE SERPL-MCNC: 5.1 MG/DL — HIGH (ref 2.1–4.9)
PLATELET # BLD AUTO: 107 K/UL — LOW (ref 130–400)
POTASSIUM SERPL-MCNC: 4.2 MMOL/L — SIGNIFICANT CHANGE UP (ref 3.5–5)
POTASSIUM SERPL-MCNC: 4.3 MMOL/L — SIGNIFICANT CHANGE UP (ref 3.5–5)
POTASSIUM SERPL-MCNC: 4.6 MMOL/L — SIGNIFICANT CHANGE UP (ref 3.5–5)
POTASSIUM SERPL-SCNC: 4.2 MMOL/L — SIGNIFICANT CHANGE UP (ref 3.5–5)
POTASSIUM SERPL-SCNC: 4.3 MMOL/L — SIGNIFICANT CHANGE UP (ref 3.5–5)
POTASSIUM SERPL-SCNC: 4.6 MMOL/L — SIGNIFICANT CHANGE UP (ref 3.5–5)
PROT SERPL-MCNC: 4.1 G/DL — LOW (ref 6–8)
PROT SERPL-MCNC: 4.5 G/DL — LOW (ref 6–8)
PTH-INTACT FLD-MCNC: 415 PG/ML — HIGH (ref 15–65)
RBC # BLD: 2.93 M/UL — LOW (ref 4.2–5.4)
RBC # FLD: 14.7 % — HIGH (ref 11.5–14.5)
SODIUM SERPL-SCNC: 144 MMOL/L — SIGNIFICANT CHANGE UP (ref 135–146)
SODIUM SERPL-SCNC: 145 MMOL/L — SIGNIFICANT CHANGE UP (ref 135–146)
SODIUM SERPL-SCNC: 146 MMOL/L — SIGNIFICANT CHANGE UP (ref 135–146)
WBC # BLD: 14.22 K/UL — HIGH (ref 4.8–10.8)
WBC # FLD AUTO: 14.22 K/UL — HIGH (ref 4.8–10.8)

## 2022-02-15 PROCEDURE — 99291 CRITICAL CARE FIRST HOUR: CPT

## 2022-02-15 PROCEDURE — 71045 X-RAY EXAM CHEST 1 VIEW: CPT | Mod: 26

## 2022-02-15 PROCEDURE — 93010 ELECTROCARDIOGRAM REPORT: CPT

## 2022-02-15 PROCEDURE — 99232 SBSQ HOSP IP/OBS MODERATE 35: CPT

## 2022-02-15 RX ORDER — SODIUM CHLORIDE 9 MG/ML
1000 INJECTION, SOLUTION INTRAVENOUS
Refills: 0 | Status: DISCONTINUED | OUTPATIENT
Start: 2022-02-15 | End: 2022-03-01

## 2022-02-15 RX ORDER — SENNA PLUS 8.6 MG/1
2 TABLET ORAL AT BEDTIME
Refills: 0 | Status: DISCONTINUED | OUTPATIENT
Start: 2022-02-15 | End: 2022-02-17

## 2022-02-15 RX ORDER — PIPERACILLIN AND TAZOBACTAM 4; .5 G/20ML; G/20ML
2.25 INJECTION, POWDER, LYOPHILIZED, FOR SOLUTION INTRAVENOUS EVERY 6 HOURS
Refills: 0 | Status: DISCONTINUED | OUTPATIENT
Start: 2022-02-15 | End: 2022-02-28

## 2022-02-15 RX ORDER — GLUCAGON INJECTION, SOLUTION 0.5 MG/.1ML
1 INJECTION, SOLUTION SUBCUTANEOUS ONCE
Refills: 0 | Status: DISCONTINUED | OUTPATIENT
Start: 2022-02-15 | End: 2022-03-01

## 2022-02-15 RX ORDER — DEXTROSE 50 % IN WATER 50 %
12.5 SYRINGE (ML) INTRAVENOUS ONCE
Refills: 0 | Status: DISCONTINUED | OUTPATIENT
Start: 2022-02-15 | End: 2022-03-01

## 2022-02-15 RX ORDER — INSULIN LISPRO 100/ML
2 VIAL (ML) SUBCUTANEOUS ONCE
Refills: 0 | Status: DISCONTINUED | OUTPATIENT
Start: 2022-02-15 | End: 2022-02-15

## 2022-02-15 RX ORDER — DEXTROSE 50 % IN WATER 50 %
25 SYRINGE (ML) INTRAVENOUS ONCE
Refills: 0 | Status: DISCONTINUED | OUTPATIENT
Start: 2022-02-15 | End: 2022-03-01

## 2022-02-15 RX ORDER — INSULIN LISPRO 100/ML
2 VIAL (ML) SUBCUTANEOUS ONCE
Refills: 0 | Status: COMPLETED | OUTPATIENT
Start: 2022-02-15 | End: 2022-02-15

## 2022-02-15 RX ORDER — PANTOPRAZOLE SODIUM 20 MG/1
40 TABLET, DELAYED RELEASE ORAL DAILY
Refills: 0 | Status: DISCONTINUED | OUTPATIENT
Start: 2022-02-15 | End: 2022-03-01

## 2022-02-15 RX ORDER — DEXTROSE 50 % IN WATER 50 %
15 SYRINGE (ML) INTRAVENOUS ONCE
Refills: 0 | Status: DISCONTINUED | OUTPATIENT
Start: 2022-02-15 | End: 2022-03-01

## 2022-02-15 RX ORDER — POLYETHYLENE GLYCOL 3350 17 G/17G
17 POWDER, FOR SOLUTION ORAL DAILY
Refills: 0 | Status: DISCONTINUED | OUTPATIENT
Start: 2022-02-15 | End: 2022-02-17

## 2022-02-15 RX ORDER — PIPERACILLIN AND TAZOBACTAM 4; .5 G/20ML; G/20ML
3.38 INJECTION, POWDER, LYOPHILIZED, FOR SOLUTION INTRAVENOUS ONCE
Refills: 0 | Status: COMPLETED | OUTPATIENT
Start: 2022-02-15 | End: 2022-02-15

## 2022-02-15 RX ORDER — INSULIN LISPRO 100/ML
VIAL (ML) SUBCUTANEOUS EVERY 8 HOURS
Refills: 0 | Status: DISCONTINUED | OUTPATIENT
Start: 2022-02-15 | End: 2022-02-16

## 2022-02-15 RX ADMIN — PANTOPRAZOLE SODIUM 40 MILLIGRAM(S): 20 TABLET, DELAYED RELEASE ORAL at 11:11

## 2022-02-15 RX ADMIN — CHLORHEXIDINE GLUCONATE 1 APPLICATION(S): 213 SOLUTION TOPICAL at 05:00

## 2022-02-15 RX ADMIN — Medication 100 GRAM(S): at 09:33

## 2022-02-15 RX ADMIN — HEPARIN SODIUM 5000 UNIT(S): 5000 INJECTION INTRAVENOUS; SUBCUTANEOUS at 05:01

## 2022-02-15 RX ADMIN — Medication 2 UNIT(S): at 18:23

## 2022-02-15 RX ADMIN — Medication 1 APPLICATION(S): at 11:12

## 2022-02-15 RX ADMIN — PIPERACILLIN AND TAZOBACTAM 200 GRAM(S): 4; .5 INJECTION, POWDER, LYOPHILIZED, FOR SOLUTION INTRAVENOUS at 23:07

## 2022-02-15 RX ADMIN — PIPERACILLIN AND TAZOBACTAM 200 GRAM(S): 4; .5 INJECTION, POWDER, LYOPHILIZED, FOR SOLUTION INTRAVENOUS at 17:14

## 2022-02-15 RX ADMIN — Medication 100 MILLIGRAM(S): at 05:01

## 2022-02-15 RX ADMIN — PIPERACILLIN AND TAZOBACTAM 200 GRAM(S): 4; .5 INJECTION, POWDER, LYOPHILIZED, FOR SOLUTION INTRAVENOUS at 10:47

## 2022-02-15 RX ADMIN — Medication 1 APPLICATION(S): at 06:14

## 2022-02-15 RX ADMIN — SENNA PLUS 2 TABLET(S): 8.6 TABLET ORAL at 21:27

## 2022-02-15 RX ADMIN — FENTANYL CITRATE 2.25 MICROGRAM(S)/KG/HR: 50 INJECTION INTRAVENOUS at 08:39

## 2022-02-15 RX ADMIN — Medication 1: at 23:16

## 2022-02-15 RX ADMIN — HEPARIN SODIUM 5000 UNIT(S): 5000 INJECTION INTRAVENOUS; SUBCUTANEOUS at 17:13

## 2022-02-15 RX ADMIN — Medication 1 APPLICATION(S): at 17:14

## 2022-02-15 RX ADMIN — POLYETHYLENE GLYCOL 3350 17 GRAM(S): 17 POWDER, FOR SOLUTION ORAL at 11:11

## 2022-02-15 RX ADMIN — AMPICILLIN SODIUM AND SULBACTAM SODIUM 100 GRAM(S): 250; 125 INJECTION, POWDER, FOR SUSPENSION INTRAMUSCULAR; INTRAVENOUS at 05:00

## 2022-02-15 RX ADMIN — Medication 60 MILLIGRAM(S): at 05:00

## 2022-02-15 RX ADMIN — CHLORHEXIDINE GLUCONATE 15 MILLILITER(S): 213 SOLUTION TOPICAL at 05:01

## 2022-02-15 RX ADMIN — CHLORHEXIDINE GLUCONATE 15 MILLILITER(S): 213 SOLUTION TOPICAL at 17:13

## 2022-02-15 RX ADMIN — Medication 100 GRAM(S): at 03:00

## 2022-02-15 NOTE — PROGRESS NOTE ADULT - SUBJECTIVE AND OBJECTIVE BOX
ARI MA  88y, Female    All available historical data reviewed    OVERNIGHT EVENTS:  no fevers  No BM since admission  does not follow commands  Fio2 30%  on pressors    ROS:  unable to obtain history secondary to patient's mental status and/or sedation     VITALS:  T(F): 98.8, Max: 99.3 (02-15-22 @ 00:00)  HR: 86  BP: --  RR: 19Vital Signs Last 24 Hrs  T(C): 37.1 (15 Feb 2022 08:00), Max: 37.4 (15 Feb 2022 00:00)  T(F): 98.8 (15 Feb 2022 08:00), Max: 99.3 (15 Feb 2022 00:00)  HR: 86 (15 Feb 2022 08:00) (56 - 105)  BP: --  BP(mean): --  RR: 19 (15 Feb 2022 08:00) (17 - 37)  SpO2: 99% (15 Feb 2022 08:00) (93% - 100%)    TESTS & MEASUREMENTS:                        8.5    14.22 )-----------( 107      ( 15 Feb 2022 05:30 )             25.9     02-15    146  |  111<H>  |  34<H>  ----------------------------<  121<H>  4.6   |  23  |  0.7    Ca    6.2<L>      15 Feb 2022 05:30  Phos  5.1     02-15  Mg     2.5     02-15    TPro  4.1<L>  /  Alb  1.7<L>  /  TBili  1.2  /  DBili  x   /  AST  185<H>  /  ALT  111<H>  /  AlkPhos  151<H>  02-15    LIVER FUNCTIONS - ( 15 Feb 2022 05:30 )  Alb: 1.7 g/dL / Pro: 4.1 g/dL / ALK PHOS: 151 U/L / ALT: 111 U/L / AST: 185 U/L / GGT: x             Culture - Sputum (collected 02-09-22 @ 18:42)  Source: .Sputum Sputum  Gram Stain (02-10-22 @ 07:17):    Moderate polymorphonuclear leukocytes per low power field    No Squamous epithelial cells per low power field    No organisms seen per oil power field  Final Report (02-12-22 @ 15:04):    Rare Pseudomonas aeruginosa    Normal Respiratory Candida absent  Organism: Pseudomonas aeruginosa (02-12-22 @ 15:04)  Organism: Pseudomonas aeruginosa (02-12-22 @ 15:04)      -  Amikacin: S <=16      -  Aztreonam: S <=4      -  Cefepime: S 4      -  Ceftazidime: S 4      -  Ciprofloxacin: S <=0.25      -  Gentamicin: S <=2      -  Imipenem: S <=1      -  Levofloxacin: S <=0.5      -  Meropenem: S <=1      -  Piperacillin/Tazobactam: S <=8      -  Tobramycin: S <=2      Method Type: MARISELA    Culture - Urine (collected 02-09-22 @ 00:00)  Source: Clean Catch Clean Catch (Midstream)  Final Report (02-10-22 @ 06:28):    No growth    Culture - Blood (collected 02-08-22 @ 23:55)  Source: .Blood Blood  Final Report (02-14-22 @ 07:00):    No Growth Final    Culture - Blood (collected 02-08-22 @ 23:55)  Source: .Blood Blood  Final Report (02-14-22 @ 07:00):    No Growth Final            RADIOLOGY & ADDITIONAL TESTS:  Personal review of radiological diagnostics performed  Echo and EKG results noted when applicable.     MEDICATIONS:  ampicillin/sulbactam  IVPB      ampicillin/sulbactam  IVPB 1.5 Gram(s) IV Intermittent every 12 hours  calcium gluconate IVPB 1 Gram(s) IV Intermittent once  chlorhexidine 0.12% Liquid 15 milliLiter(s) Oral Mucosa every 12 hours  chlorhexidine 4% Liquid 1 Application(s) Topical <User Schedule>  collagenase Ointment 1 Application(s) Topical two times a day  doxycycline hyclate Capsule 100 milliGRAM(s) Oral every 12 hours  fentaNYL   Infusion. 0.5 MICROgram(s)/kG/Hr IV Continuous <Continuous>  heparin   Injectable 5000 Unit(s) SubCutaneous every 12 hours  influenza  Vaccine (HIGH DOSE) 0.7 milliLiter(s) IntraMuscular once  norepinephrine Infusion 0.05 MICROgram(s)/kG/Min IV Continuous <Continuous>  pantoprazole  Injectable 40 milliGRAM(s) IV Push daily  polyethylene glycol 3350 17 Gram(s) Oral daily  potassium chloride   Powder 20 milliEquivalent(s) Oral once  predniSONE   Tablet 60 milliGRAM(s) Oral daily  senna 2 Tablet(s) Oral at bedtime  vitamin A &amp; D Ointment 1 Application(s) Topical daily      ANTIBIOTICS:  ampicillin/sulbactam  IVPB      ampicillin/sulbactam  IVPB 1.5 Gram(s) IV Intermittent every 12 hours  doxycycline hyclate Capsule 100 milliGRAM(s) Oral every 12 hours

## 2022-02-15 NOTE — PROGRESS NOTE ADULT - ASSESSMENT
Torsades de pointes  Acute Hypoxic Respiratory Failure 2/2 aspiration  Severe hypernatremia  HT1: Mild Hypothermia  Severely reduced kidney function (Possible prerenal JILL, baseline Cr unknown)  HTN  NAGMA    -keep K>4 and Mg>2  -repeat BMP and Mg BID  -serial EKG  -avoid QT prolonging agents   -keep minimal optimal required sedation  -get TTE if possible   -avoid hypoxia

## 2022-02-15 NOTE — PROGRESS NOTE ADULT - ASSESSMENT
87 y/o F w/ PMH/o HTN (as per son at bedside) presenting to the hospital BIBA after experiencing respiratory distress. According to the patients family, the patient was being fed when she began choking and had difficulty breathing. Pt was bag masked by EMS and transported to the hospital. According to the family, the patient began to experience diffuse skin reaction across her entire body as well as ulcerations in her mouth for the past month. The family denied noticing fever or chills.    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 According to the patients family, the patient was being fed when she began choking and had difficulty breathing. Pt was bag masked by EMS and transported to the hospital. According to the family, the patient began to experience diffuse skin reaction across her entire body as well as ulcerations in her mouth for the past month. The family denied noticing fever or chills.    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

## 2022-02-15 NOTE — PROGRESS NOTE ADULT - ASSESSMENT
88y Female with PMH HTN presenting to the hospital [facility] after experiencing respiratory distress. Nephrology consulted for sodium >180 and evidence of JILL in setting of unknown baseline kidney function.     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

## 2022-02-15 NOTE — PROGRESS NOTE ADULT - SUBJECTIVE AND OBJECTIVE BOX
Nephrology progress note    Patient was seen and examined, events over the last 24 h noted .  Cr improved    Allergies:  No Known Allergies    Hospital Medications:   MEDICATIONS  (STANDING):  chlorhexidine 0.12% Liquid 15 milliLiter(s) Oral Mucosa every 12 hours  chlorhexidine 4% Liquid 1 Application(s) Topical <User Schedule>  collagenase Ointment 1 Application(s) Topical two times a day  fentaNYL   Infusion. 0.5 MICROgram(s)/kG/Hr (2.25 mL/Hr) IV Continuous <Continuous>  heparin   Injectable 5000 Unit(s) SubCutaneous every 12 hours  influenza  Vaccine (HIGH DOSE) 0.7 milliLiter(s) IntraMuscular once  norepinephrine Infusion 0.05 MICROgram(s)/kG/Min (4.22 mL/Hr) IV Continuous <Continuous>  pantoprazole  Injectable 40 milliGRAM(s) IV Push daily  piperacillin/tazobactam IVPB.. 2.25 Gram(s) IV Intermittent every 6 hours  polyethylene glycol 3350 17 Gram(s) Oral daily  potassium chloride   Powder 20 milliEquivalent(s) Oral once  predniSONE   Tablet 60 milliGRAM(s) Oral daily  senna 2 Tablet(s) Oral at bedtime  vitamin A &amp; D Ointment 1 Application(s) Topical daily        VITALS:  T(F): 98.9 (02-15-22 @ 16:00), Max: 99.3 (02-15-22 @ 00:00)  HR: 91 (02-15-22 @ 16:00)  BP: --  RR: 35 (02-15-22 @ 16:00)  SpO2: 99% (02-15-22 @ 16:00)  Wt(kg): --     @ 07:  -   @ 07:00  --------------------------------------------------------  IN: 2773.8 mL / OUT: 710 mL / NET: 2063.8 mL     @ 07:01  -  02-15 @ 07:00  --------------------------------------------------------  IN: 2516.5 mL / OUT: 1075 mL / NET: 1441.5 mL    02-15 @ 07:01  -  02-15 @ 16:22  --------------------------------------------------------  IN: 1568.2 mL / OUT: 650 mL / NET: 918.2 mL          PHYSICAL EXAM:  Constitutional: NAD  HEENT: anicteric sclera, oropharynx clear, MMM  Neck: No JVD  Respiratory: CTAB, no wheezes, rales or rhonchi  Cardiovascular: S1, S2, RRR  Gastrointestinal: BS+, soft, NT/ND  Extremities: No cyanosis or clubbing. No peripheral edema  :  No nesbitt.   Skin: No rashes    LABS:  02-15    146  |  111<H>  |  34<H>  ----------------------------<  121<H>  4.6   |  23  |  0.7    Ca    6.2<L>      15 Feb 2022 05:30  Phos  5.1     02-15  Mg     2.5     02-15    TPro  4.1<L>  /  Alb  1.7<L>  /  TBili  1.2  /  DBili      /  AST  185<H>  /  ALT  111<H>  /  AlkPhos  151<H>  02-15                          8.5    14.22 )-----------( 107      ( 15 Feb 2022 05:30 )             25.9       Urine Studies:  Urinalysis Basic - ( 2022 00:00 )    Color: Yellow / Appearance: Slightly Turbid / S.026 / pH:   Gluc:  / Ketone: Trace  / Bili: Negative / Urobili: <2 mg/dL   Blood:  / Protein: 30 mg/dL / Nitrite: Negative   Leuk Esterase: Negative / RBC: 5 /HPF / WBC 3 /HPF   Sq Epi:  / Non Sq Epi: 2 /HPF / Bacteria: Negative      Sodium, Random Urine: 45.0 mmoL/L ( @ 18:52)  Osmolality, Random Urine: 598 mos/kg ( @ 18:52)  Creatinine, Random Urine: 116 mg/dL ( @ 18:52)    RADIOLOGY & ADDITIONAL STUDIES:

## 2022-02-15 NOTE — PROGRESS NOTE ADULT - SUBJECTIVE AND OBJECTIVE BOX
Patient is a 88y old  Female who presents with a chief complaint of respiratory failure (11 Feb 2022 22:40)      Over Night Events:  Patient seen and examined.   still vented off levophid     ROS:  See HPI    PHYSICAL EXAM    ICU Vital Signs Last 24 Hrs  T(C): 37.3 (15 Feb 2022 04:00), Max: 37.4 (15 Feb 2022 00:00)  T(F): 99.1 (15 Feb 2022 04:00), Max: 99.3 (15 Feb 2022 00:00)  HR: 90 (15 Feb 2022 07:00) (47 - 105)  BP: --  BP(mean): --  ABP: 158/57 (15 Feb 2022 07:00) (108/43 - 181/64)  ABP(mean): 97 (15 Feb 2022 07:00) (63 - 111)  RR: 17 (15 Feb 2022 07:00) (13 - 37)  SpO2: 100% (15 Feb 2022 07:00) (93% - 100%)      General: open eyes   HEENT:           et tube      Lymph Nodes: NO cervical LN   Lungs: Bilateral BS  Cardiovascular: Regular   Abdomen: Soft, Positive BS  Extremities: No clubbing   Skin: multiple skin lesion and weeping   Neurological: positive gag   Musculoskeletal: move all ext     I&O's Detail    14 Feb 2022 07:01  -  15 Feb 2022 07:00  --------------------------------------------------------  IN:    Enteral Tube Flush: 100 mL    FentaNYL: 166 mL    Free Water: 600 mL    Glucerna 1.5: 840 mL    Insulin: 12 mL    IV PiggyBack: 750 mL    Norepinephrine: 48.5 mL  Total IN: 2516.5 mL    OUT:    Indwelling Catheter - Urethral (mL): 1075 mL    Propofol: 0 mL    Stool (mL): 0 mL  Total OUT: 1075 mL    Total NET: 1441.5 mL          LABS:                          8.5    14.22 )-----------( 107      ( 15 Feb 2022 05:30 )             25.9         15 Feb 2022 05:30    146    |  111    |  34     ----------------------------<  121    4.6     |  23     |  0.7      Ca    6.2        15 Feb 2022 05:30  Phos  5.1       15 Feb 2022 05:30  Mg     2.5       15 Feb 2022 05:30    TPro  4.1    /  Alb  1.7    /  TBili  1.2    /  DBili  x      /  AST  185    /  ALT  111    /  AlkPhos  151    15 Feb 2022 05:30  Amylase x     lipase x                                                                                                                                                                                                 Mode: AC/ CMV (Assist Control/ Continuous Mandatory Ventilation)  RR (machine): 12  TV (machine): 300  FiO2: 30  PEEP: 5  ITime: 1  MAP: 8  PIP: 21                                      ABG - ( 15 Feb 2022 03:12 )  pH, Arterial: 7.51  pH, Blood: x     /  pCO2: 32    /  pO2: 122   / HCO3: 26    / Base Excess: 3.0   /  SaO2: 99.2                MEDICATIONS  (STANDING):  ampicillin/sulbactam  IVPB      ampicillin/sulbactam  IVPB 1.5 Gram(s) IV Intermittent every 12 hours  calcium gluconate IVPB 1 Gram(s) IV Intermittent once  chlorhexidine 0.12% Liquid 15 milliLiter(s) Oral Mucosa every 12 hours  chlorhexidine 4% Liquid 1 Application(s) Topical <User Schedule>  collagenase Ointment 1 Application(s) Topical two times a day  doxycycline hyclate Capsule 100 milliGRAM(s) Oral every 12 hours  fentaNYL   Infusion. 0.5 MICROgram(s)/kG/Hr (2.25 mL/Hr) IV Continuous <Continuous>  heparin   Injectable 5000 Unit(s) SubCutaneous every 12 hours  influenza  Vaccine (HIGH DOSE) 0.7 milliLiter(s) IntraMuscular once  norepinephrine Infusion 0.05 MICROgram(s)/kG/Min (4.22 mL/Hr) IV Continuous <Continuous>  pantoprazole  Injectable 40 milliGRAM(s) IV Push daily  polyethylene glycol 3350 17 Gram(s) Oral daily  potassium chloride   Powder 20 milliEquivalent(s) Oral once  predniSONE   Tablet 60 milliGRAM(s) Oral daily  senna 2 Tablet(s) Oral at bedtime  vitamin A &amp; D Ointment 1 Application(s) Topical daily    MEDICATIONS  (PRN):          Xrays:  TLC:  OG:  ET tube:                                                                                    increase marking b/l    ECHO:  [facility] ICU:

## 2022-02-15 NOTE — PROGRESS NOTE ADULT - SUBJECTIVE AND OBJECTIVE BOX
ARI MA, 88y, Female; MRN# [id number]  Hospital Stay: 6d.    Patient is a 88y old Female who presents with a chief complaint of respiratory failure (11 Feb 2022 22:40)  Currently admitted to the ICU with the primary diagnosis of Acute respiratory failure with hypoxia      SUBJECTIVE:  Interval/Overnight events: No overnight events. Pt on 12/300/5/30%.     REVIEW OF SYSTEMS:  As per HPI  OBJECTIVE:  VITALS:  T(F): 98.8 (02-15-22 @ 08:00), Max: 99.3 (02-15-22 @ 00:00)  HR: 86 (02-15-22 @ 08:00) (56 - 105)  BP: --  ABP: 184/63 (02-15-22 @ 08:00) (108/43 - 184/63)  ABP(mean): 114 (02-15-22 @ 08:00) (63 - 114)  RR: 19 (02-15-22 @ 08:00) (17 - 37)  SpO2: 99% (02-15-22 @ 08:00) (93% - 100%)    Vent Settings  Device: Avea, Mode: AC/ CMV (Assist Control/ Continuous Mandatory Ventilation), RR (machine): 12, TV (machine): 300, FiO2: 30, PEEP: 5, ITime: 1, MAP: 8, PIP: 21  Blood Gas  02-15-22 @ 03:12  pH 7.51 | pCO2 32 | pO2 122 | HCO3 26 | O2 Sat 99.2    Drips  fentaNYL   Infusion. 0.5 MICROgram(s)/kG/Hr (2.25 mL/Hr) IV Continuous <Continuous>  norepinephrine Infusion 0.05 MICROgram(s)/kG/Min (4.22 mL/Hr) IV Continuous <Continuous>    I&Os:    02-14-22 @ 07:01  -  02-15-22 @ 07:00  --------------------------------------------------------  IN: 2516.5 mL / OUT: 1075 mL / NET: 1441.5 mL    02-15-22 @ 07:01  -  02-15-22 @ 08:29  --------------------------------------------------------  IN: 51 mL / OUT: 100 mL / NET: -49 mL      PHYSICAL EXAM:  CONST: thin elderly female  NECK:  supple, + R IJ TLC  CARDIAC:  regular rate and rhythm, normal S1 and S2  RESP:  respiratory rate and effort appear normal for age; lungs are clear to auscultation bilaterally  ABDOMEN:  soft, nontender  MUSCULOSKELETAL/NEURO: sedated  SKIN:  + diffuse bullae    ACTIVE MEDICATIONS:  Standing  calcium gluconate IVPB 1 Gram(s) IV Intermittent once  chlorhexidine 0.12% Liquid 15 milliLiter(s) Oral Mucosa every 12 hours  chlorhexidine 4% Liquid 1 Application(s) Topical <User Schedule>  collagenase Ointment 1 Application(s) Topical two times a day  fentaNYL   Infusion. 0.5 MICROgram(s)/kG/Hr (2.25 mL/Hr) IV Continuous <Continuous>  heparin   Injectable 5000 Unit(s) SubCutaneous every 12 hours  influenza  Vaccine (HIGH DOSE) 0.7 milliLiter(s) IntraMuscular once  norepinephrine Infusion 0.05 MICROgram(s)/kG/Min (4.22 mL/Hr) IV Continuous <Continuous>  pantoprazole  Injectable 40 milliGRAM(s) IV Push daily  piperacillin/tazobactam IVPB. 3.375 Gram(s) IV Intermittent once  piperacillin/tazobactam IVPB.. 2.25 Gram(s) IV Intermittent every 6 hours  polyethylene glycol 3350 17 Gram(s) Oral daily  potassium chloride   Powder 20 milliEquivalent(s) Oral once  predniSONE   Tablet 60 milliGRAM(s) Oral daily  senna 2 Tablet(s) Oral at bedtime  vitamin A &amp; D Ointment 1 Application(s) Topical daily    PRN    LABS:  02-15-22 @ 05:30  WBC 14.22<H>, H/H 8.5<L>/25.9<L>, plt 107<L>  Na 146, K 4.6, Cl 111<H>, CO2 23, BUN 34<H>, Cr 0.7, Glu 121<H>    Ca 6.2<L>, Mg 2.5<H>, Phosphorus 5.1<H>    Tot Protein 4.1<L>, Alb 1.7<L>, T. Bili 1.2, D. Bili --  <H>, <H>, Alk phos 151<H>    02-14-22 @ 23:55  WBC --, H/H --/--, plt --  Na 144, K 4.3, Cl 109, CO2 23, BUN 35<H>, Cr 0.7, Glu 160<H>    Ca 6.1<L>, Mg 2.5<H>, Phosphorus --    Tot Protein 4.5<L>, Alb 1.9<L>, T. Bili 1.3<H>, D. Bili --  <H>, <H>, Alk phos 161<H>    02-14-22 @ 21:36  WBC --, H/H --/--, plt --  Na --, K --, Cl --, CO2 --, BUN --, Cr --, Glu --    Ca --, Mg tnp, Phosphorus --    Tot Protein --, Alb --, T. Bili --, D. Bili --  AST --, ALT --, Alk phos --    02-14-22 @ 16:00  WBC --, H/H --/--, plt --  Na --, K --, Cl --, CO2 --, BUN --, Cr --, Glu --    Ca --, Mg 1.1<L>, Phosphorus --    Tot Protein --, Alb --, T. Bili --, D. Bili --  AST --, ALT --, Alk phos --    02-14-22 @ 14:02  WBC --, H/H --/--, plt --  Na 147<H>, K 4.7, Cl 113<H>, CO2 23, BUN 34<H>, Cr 0.7, Glu 162<H>    Ca 6.1<L>, Mg 2.9<H>, Phosphorus --    Tot Protein 4.9<L>, Alb 2.1<L>, T. Bili 1.6<H>, D. Bili --  <H>, <H>, Alk phos 156<H>          02-10-22 @ 04:30:  TSH 1.39      02-10-22 @ 04:30:  Hgb A1c 6.9<H>% | Mean plasma glucose 151<H>    02-10-22 @ 04:30:  Total Chol 83 | Non-HDL 69 | HDL 14<L>  LDL-Direct -- | LDL-Calc 19 | <H>    02-10-22 @ 04:30:  Troponin T 0.13<HH>  02-09-22 @ 17:41:  Troponin T 0.12<HH>  02-08-22 @ 23:55:  Troponin T 0.10<HH>    02-09-22 @ 00:00:  COVID-19 PCR: NotDetec        CULTURES:    Culture - Sputum (collected 02-09-22 @ 18:42)  Source: .Sputum Sputum  Gram Stain (02-10-22 @ 07:17):    Moderate polymorphonuclear leukocytes per low power field    No Squamous epithelial cells per low power field    No organisms seen per oil power field  Final Report (02-12-22 @ 15:04):    Rare Pseudomonas aeruginosa    Normal Respiratory Candida absent  Organism: Pseudomonas aeruginosa (02-12-22 @ 15:04)  Organism: Pseudomonas aeruginosa (02-12-22 @ 15:04)      -  Amikacin: S <=16      -  Aztreonam: S <=4      -  Cefepime: S 4      -  Ceftazidime: S 4      -  Ciprofloxacin: S <=0.25      -  Gentamicin: S <=2      -  Imipenem: S <=1      -  Levofloxacin: S <=0.5      -  Meropenem: S <=1      -  Piperacillin/Tazobactam: S <=8      -  Tobramycin: S <=2      Method Type: MARISELA    Culture - Urine (collected 02-09-22 @ 00:00)  Source: Clean Catch Clean Catch (Midstream)  Final Report (02-10-22 @ 06:28):    No growth    Culture - Blood (collected 02-08-22 @ 23:55)  Source: .Blood Blood  Final Report (02-14-22 @ 07:00):    No Growth Final    Culture - Blood (collected 02-08-22 @ 23:55)  Source: .Blood Blood  Final Report (02-14-22 @ 07:00):    No Growth Final        IMAGING:  < from: Xray Chest 1 View- PORTABLE-Urgent (Xray Chest 1 View- PORTABLE-Urgent .) (02.14.22 @ 21:22) >  Impression:    Interval placement of right IJ central venous catheter with tip overlying   SVC; no evidence of pneumothorax.  Stable additional support devices.  Stable bibasilar opacities.    < end of copied text >      ECHO:  < from: TTE Echo Complete w/o Contrast w/ Doppler (02.14.22 @ 11:31) >  Summary:   1. Left ventricular ejection fraction, by visual estimation, is 60 to   65%.   2. Normal global left ventricular systolic function.   3. Spectral Doppler shows impaired relaxation pattern of left   ventricular myocardial filling (Grade I diastolic dysfunction).    < end of copied text >

## 2022-02-15 NOTE — PROGRESS NOTE ADULT - SUBJECTIVE AND OBJECTIVE BOX
HPI:  87 y/o F w/ PMH/o HTN (as per son at bedside) presenting to the hospital BIBA after experiencing respiratory distress. According to the patients family, the patient was being fed when she began choking and had difficulty breathing. Pt was bag masked by EMS and transported to the hospital. According to the family, the patient began to experience diffuse skin reaction across her entire body as well as ulcerations in her mouth for the past month. The family denied noticing fever or chills.    In the ED, patient was tachypneic and hypoxic,  intubated for airway protection. Pt was found to hypothermic, started on warming blanket. Pt started on IV levophed. S/p IV vanc/ cefepime. called to evaluate         (2022 04:51)      INTERVAL HISTORY: QT in 400s    PAST MEDICAL & SURGICAL HISTORY      ALLERGIES:  No Known Allergies      MEDICATIONS:  MEDICATIONS  (STANDING):  chlorhexidine 0.12% Liquid 15 milliLiter(s) Oral Mucosa every 12 hours  chlorhexidine 4% Liquid 1 Application(s) Topical <User Schedule>  collagenase Ointment 1 Application(s) Topical two times a day  fentaNYL   Infusion. 0.5 MICROgram(s)/kG/Hr (2.25 mL/Hr) IV Continuous <Continuous>  heparin   Injectable 5000 Unit(s) SubCutaneous every 12 hours  influenza  Vaccine (HIGH DOSE) 0.7 milliLiter(s) IntraMuscular once  norepinephrine Infusion 0.05 MICROgram(s)/kG/Min (4.22 mL/Hr) IV Continuous <Continuous>  pantoprazole  Injectable 40 milliGRAM(s) IV Push daily  piperacillin/tazobactam IVPB. 3.375 Gram(s) IV Intermittent once  piperacillin/tazobactam IVPB.. 2.25 Gram(s) IV Intermittent every 6 hours  polyethylene glycol 3350 17 Gram(s) Oral daily  potassium chloride   Powder 20 milliEquivalent(s) Oral once  predniSONE   Tablet 60 milliGRAM(s) Oral daily  senna 2 Tablet(s) Oral at bedtime  vitamin A &amp; D Ointment 1 Application(s) Topical daily    MEDICATIONS  (PRN):      HOME MEDICATIONS:  Home Medications:        OBJECTIVE:  ICU Vital Signs Last 24 Hrs  T(C): 37.1 (15 Feb 2022 08:00), Max: 37.4 (15 Feb 2022 00:00)  T(F): 98.8 (15 Feb 2022 08:00), Max: 99.3 (15 Feb 2022 00:00)  HR: 80 (15 Feb 2022 09:00) (63 - 105)  BP: --  BP(mean): --  ABP: 144/48 (15 Feb 2022 09:00) (108/43 - 184/63)  ABP(mean): 81 (15 Feb 2022 09:00) (63 - 114)  RR: 20 (15 Feb 2022 09:) (17 - 37)  SpO2: 97% (15 Feb 2022 09:) (93% - 100%)    Mode: AC/ CMV (Assist Control/ Continuous Mandatory Ventilation)  RR (machine): 12  TV (machine): 300  FiO2: 30  PEEP: 5  ITime: 1  MAP: 8  PIP: 12    Adult Advanced Hemodynamics Last 24 Hrs  CVP(mm Hg): --  CVP(cm H2O): --  CO: --  CI: --  PA: --  PA(mean): --  PCWP: --  SVR: --  SVRI: --  PVR: --  PVRI: --  I&O's Summary    2022 07:01  -  15 Feb 2022 07:00  --------------------------------------------------------  IN: 2516.5 mL / OUT: 1075 mL / NET: 1441.5 mL    15 Feb 2022 07:01  -  15 Feb 2022 09:44  --------------------------------------------------------  IN: 51 mL / OUT: 200 mL / NET: -149 mL      Daily     Daily Weight in k.9 (15 Feb 2022 06:00)    PHYSICAL EXAM:  NEURO: intubated, sedated  GEN: Not in acute distress  NECK: no thyroid enlargement, no JVD  LUNGS: Clear to auscultation bilaterally   CARDIOVASCULAR: S1/S2 present, RRR , no murmurs or rubs, no carotid bruits,  + PP bilaterally  ABD: Soft, non-tender, non-distended, +BS  EXT: No ROSA  SKIN: weeping    LABS:                        8.5    14.22 )-----------( 107      ( 15 Feb 2022 05:30 )             25.9     02-15    146  |  111<H>  |  34<H>  ----------------------------<  121<H>  4.6   |  23  |  0.7    Ca    6.2<L>      15 Feb 2022 05:30  Phos  5.1     02-15  Mg     2.5     02-15    TPro  4.1<L>  /  Alb  1.7<L>  /  TBili  1.2  /  DBili  x   /  AST  185<H>  /  ALT  111<H>  /  AlkPhos  151<H>  02-15              Troponin trend:      02-10 Chol 83 LDL -- HDL 14<L> Trig 203<H>      RADIOLOGY:  -CXR:  -TTE:  -STRESS TEST:  -CATHETERIZATION:    ECG:    TELEMETRY EVENTS:

## 2022-02-15 NOTE — PROGRESS NOTE ADULT - ASSESSMENT
IMPRESSION:  Acute Hypoxic Respiratory Failure 2/2 aspiration  Severe hypernatremia  HT1: Mild Hypothermia  Severely reduced kidney function (Possible prerenal JILL, baseline Cr unknown)  HTN? History limited  bullous pemphigoid  NAGMA  torsade   PLAN:    CNS: SAT   old precedex for bradycardia if fentanyl     HEENT: Oral care    PULMONARY:  weaning trail when more awake hold sedation   continue O2 as necessary to maintain sats > 90%<94  HOB @ 45 degrees.    Aspiration precautions.   daily  ABG/ CXR,   decrease Mve    CARDIOVASCULAR:  keep map 65    EKG even though positive balance she lost weight because of skin loosing via wound   replace K, MG     GI: GI prophylaxis.  Bowel regimen   NG feed     RENAL:  BMP afternoon   free water per NG   supplement lytes.    Correct as needed.  renal management    INFECTIOUS DISEASE:  abx per ID  Follow up cultures.  follow burn     HEMATOLOGICAL:  DVT prophylaxis.  steroids for pemphigoid trial Doxycycline 100 Q12  Derm F/U    ENDOCRINE:  Follow up FS. SS. A1c. .

## 2022-02-15 NOTE — PROGRESS NOTE ADULT - ASSESSMENT
· Assessment	  89 y/o F w/ PMH/o HTN (as per son at bedside) presenting to the hospital BIBA after experiencing respiratory distress. According to the patients family, the patient was being fed when she began choking and had difficulty breathing. Pt was bag masked by EMS and transported to the hospital. According to the family, the patient began to experience diffuse bullae across her entire body as well as ulcerations in her mouth for the past month. The family denied noticing fever or chills.  In the ED, patient was tachypneic and hypoxic,  intubated for airway protection. Pt was found to hypothermic, started on warming blanket. Pt started on IV levophed. S/p IV vanc/ cefepime.    IMPRESSION;  Diffuse bullae : s/p Bx 2/9  MSOF  JILL > resolved  Aspiration with chemical pneumonitis   2/9 Sputum > P aeruginosa > colonizer ?  No bacterial PNA on CXR  2/8 BCx NG  2/9 UCx NG      RECOMMENDATIONS;  F/u  skin Bx  Zosyn 2.250 gm iv q6h  D/c Unasyn/Doxy  Off loading to prevent pressure sores and preventive measures to avoid aspiration   If any questions , please call [phone] or send a message on [employer] teams  Please update ID in real time with any pertinent new laboratory /microbiological/radiographically findings or a change in the clinical characteristics

## 2022-02-16 LAB
ALBUMIN SERPL ELPH-MCNC: 1.6 G/DL — LOW (ref 3.5–5.2)
ALBUMIN SERPL ELPH-MCNC: 1.8 G/DL — LOW (ref 3.5–5.2)
ALP SERPL-CCNC: 120 U/L — HIGH (ref 30–115)
ALP SERPL-CCNC: 153 U/L — HIGH (ref 30–115)
ALT FLD-CCNC: 134 U/L — HIGH (ref 0–41)
ALT FLD-CCNC: 139 U/L — HIGH (ref 0–41)
ANION GAP SERPL CALC-SCNC: 11 MMOL/L — SIGNIFICANT CHANGE UP (ref 7–14)
ANION GAP SERPL CALC-SCNC: 12 MMOL/L — SIGNIFICANT CHANGE UP (ref 7–14)
AST SERPL-CCNC: 145 U/L — HIGH (ref 0–41)
AST SERPL-CCNC: 181 U/L — HIGH (ref 0–41)
BASOPHILS # BLD AUTO: 0.02 K/UL — SIGNIFICANT CHANGE UP (ref 0–0.2)
BASOPHILS NFR BLD AUTO: 0.1 % — SIGNIFICANT CHANGE UP (ref 0–1)
BILIRUB SERPL-MCNC: 1 MG/DL — SIGNIFICANT CHANGE UP (ref 0.2–1.2)
BILIRUB SERPL-MCNC: 1.2 MG/DL — SIGNIFICANT CHANGE UP (ref 0.2–1.2)
BUN SERPL-MCNC: 34 MG/DL — HIGH (ref 10–20)
BUN SERPL-MCNC: 36 MG/DL — HIGH (ref 10–20)
CALCIUM SERPL-MCNC: 6.7 MG/DL — LOW (ref 8.5–10.1)
CALCIUM SERPL-MCNC: 6.7 MG/DL — LOW (ref 8.5–10.1)
CHLORIDE SERPL-SCNC: 101 MMOL/L — SIGNIFICANT CHANGE UP (ref 98–110)
CHLORIDE SERPL-SCNC: 107 MMOL/L — SIGNIFICANT CHANGE UP (ref 98–110)
CO2 SERPL-SCNC: 23 MMOL/L — SIGNIFICANT CHANGE UP (ref 17–32)
CO2 SERPL-SCNC: 24 MMOL/L — SIGNIFICANT CHANGE UP (ref 17–32)
CREAT SERPL-MCNC: 0.6 MG/DL — LOW (ref 0.7–1.5)
CREAT SERPL-MCNC: 0.7 MG/DL — SIGNIFICANT CHANGE UP (ref 0.7–1.5)
D DIMER BLD IA.RAPID-MCNC: 3908 NG/ML DDU — HIGH (ref 0–230)
EOSINOPHIL # BLD AUTO: 0 K/UL — SIGNIFICANT CHANGE UP (ref 0–0.7)
EOSINOPHIL NFR BLD AUTO: 0 % — SIGNIFICANT CHANGE UP (ref 0–8)
GAS PNL BLDA: SIGNIFICANT CHANGE UP
GLUCOSE BLDC GLUCOMTR-MCNC: 108 MG/DL — HIGH (ref 70–99)
GLUCOSE BLDC GLUCOMTR-MCNC: 172 MG/DL — HIGH (ref 70–99)
GLUCOSE BLDC GLUCOMTR-MCNC: 193 MG/DL — HIGH (ref 70–99)
GLUCOSE BLDC GLUCOMTR-MCNC: 201 MG/DL — HIGH (ref 70–99)
GLUCOSE SERPL-MCNC: 102 MG/DL — HIGH (ref 70–99)
GLUCOSE SERPL-MCNC: 199 MG/DL — HIGH (ref 70–99)
HCT VFR BLD CALC: 26.1 % — LOW (ref 37–47)
HGB BLD-MCNC: 8.7 G/DL — LOW (ref 12–16)
IMM GRANULOCYTES NFR BLD AUTO: 2.3 % — HIGH (ref 0.1–0.3)
LYMPHOCYTES # BLD AUTO: 1.33 K/UL — SIGNIFICANT CHANGE UP (ref 1.2–3.4)
LYMPHOCYTES # BLD AUTO: 6.8 % — LOW (ref 20.5–51.1)
MAGNESIUM SERPL-MCNC: 1.9 MG/DL — SIGNIFICANT CHANGE UP (ref 1.8–2.4)
MAGNESIUM SERPL-MCNC: 2.7 MG/DL — HIGH (ref 1.8–2.4)
MCHC RBC-ENTMCNC: 29.2 PG — SIGNIFICANT CHANGE UP (ref 27–31)
MCHC RBC-ENTMCNC: 33.3 G/DL — SIGNIFICANT CHANGE UP (ref 32–37)
MCV RBC AUTO: 87.6 FL — SIGNIFICANT CHANGE UP (ref 81–99)
MONOCYTES # BLD AUTO: 0.61 K/UL — HIGH (ref 0.1–0.6)
MONOCYTES NFR BLD AUTO: 3.1 % — SIGNIFICANT CHANGE UP (ref 1.7–9.3)
NEUTROPHILS # BLD AUTO: 17.1 K/UL — HIGH (ref 1.4–6.5)
NEUTROPHILS NFR BLD AUTO: 87.7 % — HIGH (ref 42.2–75.2)
NRBC # BLD: 0 /100 WBCS — SIGNIFICANT CHANGE UP (ref 0–0)
PHOSPHATE SERPL-MCNC: 3.3 MG/DL — SIGNIFICANT CHANGE UP (ref 2.1–4.9)
PLATELET # BLD AUTO: 143 K/UL — SIGNIFICANT CHANGE UP (ref 130–400)
POTASSIUM SERPL-MCNC: 3.7 MMOL/L — SIGNIFICANT CHANGE UP (ref 3.5–5)
POTASSIUM SERPL-MCNC: 4.6 MMOL/L — SIGNIFICANT CHANGE UP (ref 3.5–5)
POTASSIUM SERPL-SCNC: 3.7 MMOL/L — SIGNIFICANT CHANGE UP (ref 3.5–5)
POTASSIUM SERPL-SCNC: 4.6 MMOL/L — SIGNIFICANT CHANGE UP (ref 3.5–5)
PROT SERPL-MCNC: 4.1 G/DL — LOW (ref 6–8)
PROT SERPL-MCNC: 4.6 G/DL — LOW (ref 6–8)
RBC # BLD: 2.98 M/UL — LOW (ref 4.2–5.4)
RBC # FLD: 14.8 % — HIGH (ref 11.5–14.5)
SODIUM SERPL-SCNC: 136 MMOL/L — SIGNIFICANT CHANGE UP (ref 135–146)
SODIUM SERPL-SCNC: 142 MMOL/L — SIGNIFICANT CHANGE UP (ref 135–146)
WBC # BLD: 19.5 K/UL — HIGH (ref 4.8–10.8)
WBC # FLD AUTO: 19.5 K/UL — HIGH (ref 4.8–10.8)

## 2022-02-16 PROCEDURE — 71045 X-RAY EXAM CHEST 1 VIEW: CPT | Mod: 26

## 2022-02-16 PROCEDURE — 93010 ELECTROCARDIOGRAM REPORT: CPT

## 2022-02-16 PROCEDURE — 99291 CRITICAL CARE FIRST HOUR: CPT

## 2022-02-16 RX ORDER — POTASSIUM CHLORIDE 20 MEQ
20 PACKET (EA) ORAL
Refills: 0 | Status: COMPLETED | OUTPATIENT
Start: 2022-02-16 | End: 2022-02-16

## 2022-02-16 RX ORDER — INSULIN LISPRO 100/ML
VIAL (ML) SUBCUTANEOUS EVERY 6 HOURS
Refills: 0 | Status: DISCONTINUED | OUTPATIENT
Start: 2022-02-16 | End: 2022-03-01

## 2022-02-16 RX ORDER — MORPHINE SULFATE 50 MG/1
2 CAPSULE, EXTENDED RELEASE ORAL
Refills: 0 | Status: DISCONTINUED | OUTPATIENT
Start: 2022-02-16 | End: 2022-02-19

## 2022-02-16 RX ORDER — MAGNESIUM SULFATE 500 MG/ML
2 VIAL (ML) INJECTION ONCE
Refills: 0 | Status: COMPLETED | OUTPATIENT
Start: 2022-02-16 | End: 2022-02-16

## 2022-02-16 RX ORDER — FUROSEMIDE 40 MG
40 TABLET ORAL ONCE
Refills: 0 | Status: COMPLETED | OUTPATIENT
Start: 2022-02-16 | End: 2022-02-16

## 2022-02-16 RX ORDER — DOXERCALCIFEROL 2.5 UG/1
2 CAPSULE ORAL
Refills: 0 | Status: DISCONTINUED | OUTPATIENT
Start: 2022-02-16 | End: 2022-02-16

## 2022-02-16 RX ORDER — CALCIUM GLUCONATE 100 MG/ML
1 VIAL (ML) INTRAVENOUS
Refills: 0 | Status: DISCONTINUED | OUTPATIENT
Start: 2022-02-16 | End: 2022-02-21

## 2022-02-16 RX ADMIN — Medication 1 APPLICATION(S): at 05:00

## 2022-02-16 RX ADMIN — PIPERACILLIN AND TAZOBACTAM 200 GRAM(S): 4; .5 INJECTION, POWDER, LYOPHILIZED, FOR SOLUTION INTRAVENOUS at 17:02

## 2022-02-16 RX ADMIN — Medication 50 MILLIEQUIVALENT(S): at 10:59

## 2022-02-16 RX ADMIN — Medication 100 GRAM(S): at 12:29

## 2022-02-16 RX ADMIN — POLYETHYLENE GLYCOL 3350 17 GRAM(S): 17 POWDER, FOR SOLUTION ORAL at 12:29

## 2022-02-16 RX ADMIN — Medication 50 MILLIEQUIVALENT(S): at 06:19

## 2022-02-16 RX ADMIN — MORPHINE SULFATE 2 MILLIGRAM(S): 50 CAPSULE, EXTENDED RELEASE ORAL at 12:44

## 2022-02-16 RX ADMIN — Medication 1 APPLICATION(S): at 17:03

## 2022-02-16 RX ADMIN — PIPERACILLIN AND TAZOBACTAM 200 GRAM(S): 4; .5 INJECTION, POWDER, LYOPHILIZED, FOR SOLUTION INTRAVENOUS at 05:01

## 2022-02-16 RX ADMIN — CHLORHEXIDINE GLUCONATE 15 MILLILITER(S): 213 SOLUTION TOPICAL at 17:03

## 2022-02-16 RX ADMIN — FENTANYL CITRATE 2.25 MICROGRAM(S)/KG/HR: 50 INJECTION INTRAVENOUS at 05:45

## 2022-02-16 RX ADMIN — SENNA PLUS 2 TABLET(S): 8.6 TABLET ORAL at 21:35

## 2022-02-16 RX ADMIN — CHLORHEXIDINE GLUCONATE 15 MILLILITER(S): 213 SOLUTION TOPICAL at 05:02

## 2022-02-16 RX ADMIN — Medication 25 GRAM(S): at 06:17

## 2022-02-16 RX ADMIN — HEPARIN SODIUM 5000 UNIT(S): 5000 INJECTION INTRAVENOUS; SUBCUTANEOUS at 17:03

## 2022-02-16 RX ADMIN — PIPERACILLIN AND TAZOBACTAM 200 GRAM(S): 4; .5 INJECTION, POWDER, LYOPHILIZED, FOR SOLUTION INTRAVENOUS at 12:31

## 2022-02-16 RX ADMIN — Medication 2: at 18:18

## 2022-02-16 RX ADMIN — Medication 1 APPLICATION(S): at 12:30

## 2022-02-16 RX ADMIN — Medication 60 MILLIGRAM(S): at 05:01

## 2022-02-16 RX ADMIN — CHLORHEXIDINE GLUCONATE 1 APPLICATION(S): 213 SOLUTION TOPICAL at 05:02

## 2022-02-16 RX ADMIN — Medication 1: at 23:57

## 2022-02-16 RX ADMIN — Medication 40 MILLIGRAM(S): at 08:29

## 2022-02-16 RX ADMIN — PANTOPRAZOLE SODIUM 40 MILLIGRAM(S): 20 TABLET, DELAYED RELEASE ORAL at 12:30

## 2022-02-16 RX ADMIN — Medication 50 MILLIEQUIVALENT(S): at 08:30

## 2022-02-16 RX ADMIN — PIPERACILLIN AND TAZOBACTAM 200 GRAM(S): 4; .5 INJECTION, POWDER, LYOPHILIZED, FOR SOLUTION INTRAVENOUS at 23:57

## 2022-02-16 RX ADMIN — Medication 25 GRAM(S): at 08:30

## 2022-02-16 RX ADMIN — HEPARIN SODIUM 5000 UNIT(S): 5000 INJECTION INTRAVENOUS; SUBCUTANEOUS at 05:02

## 2022-02-16 RX ADMIN — Medication 100 GRAM(S): at 18:51

## 2022-02-16 RX ADMIN — MORPHINE SULFATE 2 MILLIGRAM(S): 50 CAPSULE, EXTENDED RELEASE ORAL at 12:29

## 2022-02-16 NOTE — PROGRESS NOTE ADULT - ASSESSMENT
87 y/o F w/ PMH/o HTN (as per son at bedside) presenting to the hospital BIBA after experiencing respiratory distress. According to the patients family, the patient was being fed when she began choking and had difficulty breathing. Pt was bag masked by EMS and transported to the hospital. According to the family, the patient began to experience diffuse skin reaction across her entire body as well as ulcerations in her mouth for the past month. The family denied noticing fever or chills.    # Acute hypoxemic respiratory failure 2/2 aspiration  - Intubated on arrival  - Vent settings 12/300/5/30%   - DTA 2/9 negative  - Blood cx 2/8 negative   - SAT today and possible SBT 02/16    # Episode of torsades on 2/14  - QTc 2/14: 733  - Keep K >4, Mg>2  - Give calcium gluconate 1g q8 as per nephro  - Kphos 02/16  - Cardio following  - TTE 2/14: EF 60-65%    # Hypernatremia, resolved  # JILL  - Na >180 on presentation  - BUN/Cr 116/3.3 on presentation (baseline Cr 0.5)   - Trend BMP  - Goal decrease by 8-10 per 24hrs  - Nephro following: Started Calcium gluc q8 and hectorol qd  - Na WNL now    # 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 According to the patients family, the patient was being fed when she began choking and had difficulty breathing. Pt was bag masked by EMS and transported to the hospital. According to the family, the patient began to experience diffuse skin reaction across her entire body as well as ulcerations in her mouth for the past month. The family denied noticing fever or chills.    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 According to the patients family, the patient was being fed when she began choking and had difficulty breathing. Pt was bag masked by EMS and transported to the hospital. According to the family, the patient began to experience diffuse skin reaction across her entire body as well as ulcerations in her mouth for the past month. The family denied noticing fever or chills.    # Acute hypoxemic respiratory failure 2/2 aspiration  - Intubated on arrival  - Vent settings 12/300/5/30%   - DTA 2/9 negative  - Blood cx 2/8 negative   - SAT today and possible SBT 02/16  - ET pulled 2 cm, repeated xray, looks good.    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

## 2022-02-16 NOTE — PROGRESS NOTE ADULT - SUBJECTIVE AND OBJECTIVE BOX
ARI MA  88y, Female    All available historical data reviewed    OVERNIGHT EVENTS:  low grade fevers  fio2 30%      ROS:  unable to obtain history secondary to patient's mental status and/or sedation     VITALS:  T(F): 99.1, Max: 100.2 (02-16-22 @ 00:00)  HR: 84  BP: --  RR: 15Vital Signs Last 24 Hrs  T(C): 37.3 (16 Feb 2022 06:00), Max: 37.9 (16 Feb 2022 00:00)  T(F): 99.1 (16 Feb 2022 06:00), Max: 100.2 (16 Feb 2022 00:00)  HR: 84 (16 Feb 2022 09:00) (75 - 93)  BP: --  BP(mean): --  RR: 15 (16 Feb 2022 09:00) (12 - 35)  SpO2: 100% (16 Feb 2022 09:00) (97% - 100%)    TESTS & MEASUREMENTS:                        8.7    19.50 )-----------( 143      ( 16 Feb 2022 04:15 )             26.1     02-16    142  |  107  |  36<H>  ----------------------------<  102<H>  3.7   |  24  |  0.7    Ca    6.7<L>      16 Feb 2022 04:15  Phos  3.3     02-16  Mg     1.9     02-16    TPro  4.6<L>  /  Alb  1.8<L>  /  TBili  1.2  /  DBili  x   /  AST  181<H>  /  ALT  139<H>  /  AlkPhos  153<H>  02-16    LIVER FUNCTIONS - ( 16 Feb 2022 04:15 )  Alb: 1.8 g/dL / Pro: 4.6 g/dL / ALK PHOS: 153 U/L / ALT: 139 U/L / AST: 181 U/L / GGT: x             Culture - Sputum (collected 02-09-22 @ 18:42)  Source: .Sputum Sputum  Gram Stain (02-10-22 @ 07:17):    Moderate polymorphonuclear leukocytes per low power field    No Squamous epithelial cells per low power field    No organisms seen per oil power field  Final Report (02-12-22 @ 15:04):    Rare Pseudomonas aeruginosa    Normal Respiratory Candida absent  Organism: Pseudomonas aeruginosa (02-12-22 @ 15:04)  Organism: Pseudomonas aeruginosa (02-12-22 @ 15:04)      -  Amikacin: S <=16      -  Aztreonam: S <=4      -  Cefepime: S 4      -  Ceftazidime: S 4      -  Ciprofloxacin: S <=0.25      -  Gentamicin: S <=2      -  Imipenem: S <=1      -  Levofloxacin: S <=0.5      -  Meropenem: S <=1      -  Piperacillin/Tazobactam: S <=8      -  Tobramycin: S <=2      Method Type: MARISELA            RADIOLOGY & ADDITIONAL TESTS:  Personal review of radiological diagnostics performed  Echo and EKG results noted when applicable.     MEDICATIONS:  chlorhexidine 0.12% Liquid 15 milliLiter(s) Oral Mucosa every 12 hours  chlorhexidine 4% Liquid 1 Application(s) Topical <User Schedule>  collagenase Ointment 1 Application(s) Topical two times a day  dextrose 40% Gel 15 Gram(s) Oral once  dextrose 5%. 1000 milliLiter(s) IV Continuous <Continuous>  dextrose 5%. 1000 milliLiter(s) IV Continuous <Continuous>  dextrose 50% Injectable 25 Gram(s) IV Push once  dextrose 50% Injectable 12.5 Gram(s) IV Push once  dextrose 50% Injectable 25 Gram(s) IV Push once  fentaNYL   Infusion. 0.5 MICROgram(s)/kG/Hr IV Continuous <Continuous>  glucagon  Injectable 1 milliGRAM(s) IntraMuscular once  heparin   Injectable 5000 Unit(s) SubCutaneous every 12 hours  influenza  Vaccine (HIGH DOSE) 0.7 milliLiter(s) IntraMuscular once  insulin lispro (ADMELOG) corrective regimen sliding scale   SubCutaneous every 8 hours  norepinephrine Infusion 0.05 MICROgram(s)/kG/Min IV Continuous <Continuous>  pantoprazole  Injectable 40 milliGRAM(s) IV Push daily  piperacillin/tazobactam IVPB.. 2.25 Gram(s) IV Intermittent every 6 hours  polyethylene glycol 3350 17 Gram(s) Oral daily  potassium chloride   Powder 20 milliEquivalent(s) Oral once  potassium chloride  20 mEq/100 mL IVPB 20 milliEquivalent(s) IV Intermittent every 2 hours  predniSONE   Tablet 60 milliGRAM(s) Oral daily  senna 2 Tablet(s) Oral at bedtime  vitamin A &amp; D Ointment 1 Application(s) Topical daily      ANTIBIOTICS:  piperacillin/tazobactam IVPB.. 2.25 Gram(s) IV Intermittent every 6 hours

## 2022-02-16 NOTE — PROGRESS NOTE ADULT - SUBJECTIVE AND OBJECTIVE BOX
Patient is a 88y old  Female who presents with a chief complaint of respiratory failure (11 Feb 2022 22:40)      Over Night Events:  Patient seen and examined.   still vented covid positive   off levo this morning   fentanyl   failed weaning trial yesterday no cuff leak   ROS:  See HPI    PHYSICAL EXAM    ICU Vital Signs Last 24 Hrs  T(C): 37.3 (16 Feb 2022 06:00), Max: 37.9 (16 Feb 2022 00:00)  T(F): 99.1 (16 Feb 2022 06:00), Max: 100.2 (16 Feb 2022 00:00)  HR: 75 (16 Feb 2022 07:00) (75 - 93)  BP: --  BP(mean): --  ABP: 96/87 (16 Feb 2022 07:00) (96/87 - 196/66)  ABP(mean): 91 (16 Feb 2022 07:00) (58 - 115)  RR: 15 (16 Feb 2022 07:00) (12 - 35)  SpO2: 100% (16 Feb 2022 07:00) (97% - 100%)      General: open eyes   HEENT:    et tube             Lymph Nodes: NO cervical LN   Lungs: Bilateral BS  Cardiovascular: Regular   Abdomen: Soft, Positive BS  Extremities: No clubbing   Skin: warm   Neurological: no focal   Musculoskeletal: move all ext     I&O's Detail    15 Feb 2022 07:01  -  16 Feb 2022 07:00  --------------------------------------------------------  IN:    Enteral Tube Flush: 100 mL    FentaNYL: 280.6 mL    Free Water: 2100 mL    Glucerna 1.5: 770 mL    IV PiggyBack: 600 mL    Norepinephrine: 7.8 mL  Total IN: 3858.4 mL    OUT:    Indwelling Catheter - Urethral (mL): 1415 mL    Stool (mL): 0 mL  Total OUT: 1415 mL    Total NET: 2443.4 mL          LABS:                          8.7    19.50 )-----------( 143      ( 16 Feb 2022 04:15 )             26.1         16 Feb 2022 04:15    142    |  107    |  36     ----------------------------<  102    3.7     |  24     |  0.7      Ca    6.7        16 Feb 2022 04:15  Phos  3.3       16 Feb 2022 04:15  Mg     1.9       16 Feb 2022 04:15    TPro  4.6    /  Alb  1.8    /  TBili  1.2    /  DBili  x      /  AST  181    /  ALT  139    /  AlkPhos  153    16 Feb 2022 04:15  Amylase x     lipase x                                                                                                                                                                                                 Mode: AC/ CMV (Assist Control/ Continuous Mandatory Ventilation)  RR (machine): 12  TV (machine): 300  FiO2: 30  PEEP: 5  ITime: 1  MAP: 7  PIP: 11                                      ABG - ( 16 Feb 2022 04:28 )  pH, Arterial: 7.46  pH, Blood: x     /  pCO2: 38    /  pO2: 93    / HCO3: 27    / Base Excess: 3.1   /  SaO2: 99.2                MEDICATIONS  (STANDING):  chlorhexidine 0.12% Liquid 15 milliLiter(s) Oral Mucosa every 12 hours  chlorhexidine 4% Liquid 1 Application(s) Topical <User Schedule>  collagenase Ointment 1 Application(s) Topical two times a day  dextrose 40% Gel 15 Gram(s) Oral once  dextrose 5%. 1000 milliLiter(s) (50 mL/Hr) IV Continuous <Continuous>  dextrose 5%. 1000 milliLiter(s) (100 mL/Hr) IV Continuous <Continuous>  dextrose 50% Injectable 25 Gram(s) IV Push once  dextrose 50% Injectable 12.5 Gram(s) IV Push once  dextrose 50% Injectable 25 Gram(s) IV Push once  fentaNYL   Infusion. 0.5 MICROgram(s)/kG/Hr (2.25 mL/Hr) IV Continuous <Continuous>  glucagon  Injectable 1 milliGRAM(s) IntraMuscular once  heparin   Injectable 5000 Unit(s) SubCutaneous every 12 hours  influenza  Vaccine (HIGH DOSE) 0.7 milliLiter(s) IntraMuscular once  insulin lispro (ADMELOG) corrective regimen sliding scale   SubCutaneous every 8 hours  magnesium sulfate  IVPB 2 Gram(s) IV Intermittent once  norepinephrine Infusion 0.05 MICROgram(s)/kG/Min (4.22 mL/Hr) IV Continuous <Continuous>  pantoprazole  Injectable 40 milliGRAM(s) IV Push daily  piperacillin/tazobactam IVPB.. 2.25 Gram(s) IV Intermittent every 6 hours  polyethylene glycol 3350 17 Gram(s) Oral daily  potassium chloride   Powder 20 milliEquivalent(s) Oral once  potassium chloride  20 mEq/100 mL IVPB 20 milliEquivalent(s) IV Intermittent every 2 hours  predniSONE   Tablet 60 milliGRAM(s) Oral daily  senna 2 Tablet(s) Oral at bedtime  vitamin A &amp; D Ointment 1 Application(s) Topical daily    MEDICATIONS  (PRN):          Xrays:  TLC:  OG:  ET tube:                                                                                    bibasilar opacity    ECHO:  [facility] ICU:

## 2022-02-16 NOTE — PROGRESS NOTE ADULT - ASSESSMENT
IMPRESSION:  Acute Hypoxic Respiratory Failure 2/2 aspiration  Severe hypernatremia  HT1: Mild Hypothermia  Severely reduced kidney function (Possible prerenal JILL, baseline Cr unknown)  HTN? History limited  bullous pemphigoid  NAGMA  torsade   PLAN:    CNS: SAT     HEENT: Oral care    PULMONARY:  check if any cuff leak proceed with weaning trail to assess   continue O2 as necessary to maintain sats > 90%<94  HOB @ 45 degrees.    Aspiration precautions.   daily  ABG/ CXR,   decrease Mve  pull et tube 2 cm out and then repeat cxr     CARDIOVASCULAR:  keep map 65    EKG   is and os not accurate because been weeping from her skin    replace K, MG   lasix  40 mg once keep is = os       GI: GI prophylaxis.  Bowel regimen   NG feed     RENAL:  BMP afternoon   free water per NG   supplement lytes.    Correct as needed.  renal management    INFECTIOUS DISEASE:  abx per ID  Follow up cultures.  follow burn     HEMATOLOGICAL:  DVT prophylaxis.  steroids for pemphigoid trial Doxycycline 100 Q12  Derm F/U    ENDOCRINE:  Follow up FS. SS. A1c. .  keep line and nesbitt for today

## 2022-02-16 NOTE — PROGRESS NOTE ADULT - ASSESSMENT
· Assessment	  87 y/o F w/ PMH/o HTN (as per son at bedside) presenting to the hospital BIBA after experiencing respiratory distress. According to the patients family, the patient was being fed when she began choking and had difficulty breathing. Pt was bag masked by EMS and transported to the hospital. According to the family, the patient began to experience diffuse bullae across her entire body as well as ulcerations in her mouth for the past month. The family denied noticing fever or chills.  In the ED, patient was tachypneic and hypoxic,  intubated for airway protection. Pt was found to hypothermic, started on warming blanket. Pt started on IV levophed. S/p IV vanc/ cefepime.    IMPRESSION;  Diffuse bullae : s/p Bx 2/9 > bullous pemphigoid  MSOF  JILL > resolved  Aspiration with chemical pneumonitis   2/9 Sputum > P aeruginosa > colonizer ?  No bacterial PNA on CXR  2/8 BCx NG  2/9 UCx NG    COVID-19 NG 1/9  COVID-19 positive 1/15  Ddimers 3908  CXR no new GGO  Pt is in the early viral replicative phase based on the timeline/onset of symptoms.   Given that she is on a vent see no indication for RDV  As there has been no change in fio2 will not recommend steroids    RECOMMENDATIONS;  Zosyn 2.250 gm iv q6h  No RDV/steroids  Off loading to prevent pressure sores and preventive measures to avoid aspiration   If any questions , please call [phone] or send a message on [facility] teams  Please update ID in real time with any pertinent new laboratory /microbiological/radiographically findings or a change in the clinical characteristics

## 2022-02-16 NOTE — PROGRESS NOTE ADULT - SUBJECTIVE AND OBJECTIVE BOX
Patient is a 88y old  Female who presents with a chief complaint of respiratory failure (2022 22:40)    HPI:  89 y/o F w/ PMH/o HTN (as per son at bedside) presenting to the [facility] after experiencing respiratory distress. According to the patients family, the patient was being fed when she began choking and had difficulty breathing. Pt was bag masked by EMS and transported to the hospital. According to the family, the patient began to experience diffuse skin reaction across her entire body as well as ulcerations in her mouth for the past month. The family denied noticing fever or chills.    In the ED, patient was tachypneic and hypoxic,  intubated for airway protection. Pt was found to hypothermic, started on warming blanket. Pt started on IV levophed. S/p IV vanc/ cefepime. called to evaluate         (2022 04:51)       INTERVAL HPI/OVERNIGHT EVENTS:   No overnight events   Afebrile, hemodynamically stable     Subjective:    ICU Vital Signs Last 24 Hrs  T(C): 37.3 (2022 06:00), Max: 37.9 (2022 00:00)  T(F): 99.1 (2022 06:00), Max: 100.2 (2022 00:00)  HR: 76 (2022 10:00) (75 - 93)  BP: --  BP(mean): --  ABP: 141/48 (2022 10:00) (96/87 - 196/66)  ABP(mean): 76 (2022 10:00) (58 - 115)  RR: 12 (2022 10:00) (12 - 35)  SpO2: 100% (2022 10:00) (97% - 100%)    I&O's Summary    15 Feb 2022 07:01  -  2022 07:00  --------------------------------------------------------  IN: 3858.4 mL / OUT: 1415 mL / NET: 2443.4 mL    2022 07:01  -  2022 11:31  --------------------------------------------------------  IN: 243.7 mL / OUT: 300 mL / NET: -56.3 mL      Mode: AC/ CMV (Assist Control/ Continuous Mandatory Ventilation)  RR (machine): 12  TV (machine): 300  FiO2: 30  PEEP: 5  ITime: 1  MAP: 8  PIP: 22      Daily     Daily Weight in k.3 (2022 05:00)    Adult Advanced Hemodynamics Last 24 Hrs  CVP(mm Hg): --  CVP(cm H2O): --  CO: --  CI: --  PA: --  PA(mean): --  PCWP: --  SVR: --  SVRI: --  PVR: --  PVRI: --    EKG/Telemetry Events:    MEDICATIONS  (STANDING):  chlorhexidine 0.12% Liquid 15 milliLiter(s) Oral Mucosa every 12 hours  chlorhexidine 4% Liquid 1 Application(s) Topical <User Schedule>  collagenase Ointment 1 Application(s) Topical two times a day  dextrose 40% Gel 15 Gram(s) Oral once  dextrose 5%. 1000 milliLiter(s) (50 mL/Hr) IV Continuous <Continuous>  dextrose 5%. 1000 milliLiter(s) (100 mL/Hr) IV Continuous <Continuous>  dextrose 50% Injectable 25 Gram(s) IV Push once  dextrose 50% Injectable 12.5 Gram(s) IV Push once  dextrose 50% Injectable 25 Gram(s) IV Push once  fentaNYL   Infusion. 0.5 MICROgram(s)/kG/Hr (2.25 mL/Hr) IV Continuous <Continuous>  glucagon  Injectable 1 milliGRAM(s) IntraMuscular once  heparin   Injectable 5000 Unit(s) SubCutaneous every 12 hours  influenza  Vaccine (HIGH DOSE) 0.7 milliLiter(s) IntraMuscular once  insulin lispro (ADMELOG) corrective regimen sliding scale   SubCutaneous every 8 hours  norepinephrine Infusion 0.05 MICROgram(s)/kG/Min (4.22 mL/Hr) IV Continuous <Continuous>  pantoprazole  Injectable 40 milliGRAM(s) IV Push daily  piperacillin/tazobactam IVPB.. 2.25 Gram(s) IV Intermittent every 6 hours  polyethylene glycol 3350 17 Gram(s) Oral daily  potassium chloride   Powder 20 milliEquivalent(s) Oral once  predniSONE   Tablet 60 milliGRAM(s) Oral daily  senna 2 Tablet(s) Oral at bedtime  vitamin A &amp; D Ointment 1 Application(s) Topical daily    MEDICATIONS  (PRN):  morphine  - Injectable 2 milliGRAM(s) IV Push two times a day PRN Severe Pain (7 - 10)      PHYSICAL EXAM:  GENERAL:   HEAD:  Atraumatic, Normocephalic  EYES: EOMI, PERRLA, conjunctiva and sclera clear  NECK: Supple, No JVD, Normal thyroid, no enlarged nodes  NERVOUS SYSTEM:  Alert & Awake.   CHEST/LUNG: B/L good air entry; No rales, rhonchi, or wheezing  HEART: S1S2 normal, no S3, Regular rate and rhythm; No murmurs  ABDOMEN: Soft, Nontender, Nondistended; Bowel sounds present  EXTREMITIES:  2+ Peripheral Pulses, No clubbing, cyanosis, or edema  LYMPH: No lymphadenopathy noted  SKIN: No rashes or lesions    LABS:                        8.7    19.50 )-----------( 143      ( 2022 04:15 )             26.1     02-16    142  |  107  |  36<H>  ----------------------------<  102<H>  3.7   |  24  |  0.7    Ca    6.7<L>      2022 04:15  Phos  3.3     02-16  Mg     1.9     -16    TPro  4.6<L>  /  Alb  1.8<L>  /  TBili  1.2  /  DBili  x   /  AST  181<H>  /  ALT  139<H>  /  AlkPhos  153<H>  -16    LIVER FUNCTIONS - ( 2022 04:15 )  Alb: 1.8 g/dL / Pro: 4.6 g/dL / ALK PHOS: 153 U/L / ALT: 139 U/L / AST: 181 U/L / GGT: x             CAPILLARY BLOOD GLUCOSE      POCT Blood Glucose.: 108 mg/dL (2022 05:08)  POCT Blood Glucose.: 156 mg/dL (15 Feb 2022 23:15)  POCT Blood Glucose.: 248 mg/dL (15 Feb 2022 17:24)  POCT Blood Glucose.: 190 mg/dL (15 Feb 2022 12:36)    ABG - ( 2022 04:28 )  pH, Arterial: 7.46  pH, Blood: x     /  pCO2: 38    /  pO2: 93    / HCO3: 27    / Base Excess: 3.1   /  SaO2: 99.2                          RADIOLOGY & ADDITIONAL TESTS:  CXR:        Care Discussed with Consultants/Other Providers [ x] YES  [ ] NO

## 2022-02-17 LAB
ALBUMIN SERPL ELPH-MCNC: 1.6 G/DL — LOW (ref 3.5–5.2)
ALP SERPL-CCNC: 113 U/L — SIGNIFICANT CHANGE UP (ref 30–115)
ALT FLD-CCNC: 123 U/L — HIGH (ref 0–41)
ANION GAP SERPL CALC-SCNC: 8 MMOL/L — SIGNIFICANT CHANGE UP (ref 7–14)
ANION GAP SERPL CALC-SCNC: 9 MMOL/L — SIGNIFICANT CHANGE UP (ref 7–14)
AST SERPL-CCNC: 103 U/L — HIGH (ref 0–41)
BASOPHILS # BLD AUTO: 0.01 K/UL — SIGNIFICANT CHANGE UP (ref 0–0.2)
BASOPHILS # BLD AUTO: 0.01 K/UL — SIGNIFICANT CHANGE UP (ref 0–0.2)
BASOPHILS NFR BLD AUTO: 0.1 % — SIGNIFICANT CHANGE UP (ref 0–1)
BASOPHILS NFR BLD AUTO: 0.1 % — SIGNIFICANT CHANGE UP (ref 0–1)
BILIRUB SERPL-MCNC: 0.8 MG/DL — SIGNIFICANT CHANGE UP (ref 0.2–1.2)
BUN SERPL-MCNC: 33 MG/DL — HIGH (ref 10–20)
BUN SERPL-MCNC: 36 MG/DL — HIGH (ref 10–20)
CALCIUM SERPL-MCNC: 6.7 MG/DL — LOW (ref 8.5–10.1)
CALCIUM SERPL-MCNC: 7.2 MG/DL — LOW (ref 8.5–10.1)
CHLORIDE SERPL-SCNC: 102 MMOL/L — SIGNIFICANT CHANGE UP (ref 98–110)
CHLORIDE SERPL-SCNC: 98 MMOL/L — SIGNIFICANT CHANGE UP (ref 98–110)
CO2 SERPL-SCNC: 25 MMOL/L — SIGNIFICANT CHANGE UP (ref 17–32)
CO2 SERPL-SCNC: 25 MMOL/L — SIGNIFICANT CHANGE UP (ref 17–32)
CREAT SERPL-MCNC: 0.5 MG/DL — LOW (ref 0.7–1.5)
CREAT SERPL-MCNC: 0.6 MG/DL — LOW (ref 0.7–1.5)
CRP SERPL-MCNC: 116 MG/L — HIGH
EOSINOPHIL # BLD AUTO: 0 K/UL — SIGNIFICANT CHANGE UP (ref 0–0.7)
EOSINOPHIL # BLD AUTO: 0.02 K/UL — SIGNIFICANT CHANGE UP (ref 0–0.7)
EOSINOPHIL NFR BLD AUTO: 0 % — SIGNIFICANT CHANGE UP (ref 0–8)
EOSINOPHIL NFR BLD AUTO: 0.1 % — SIGNIFICANT CHANGE UP (ref 0–8)
FERRITIN SERPL-MCNC: 521 NG/ML — HIGH (ref 15–150)
GAS PNL BLDA: SIGNIFICANT CHANGE UP
GLUCOSE BLDC GLUCOMTR-MCNC: 110 MG/DL — HIGH (ref 70–99)
GLUCOSE BLDC GLUCOMTR-MCNC: 158 MG/DL — HIGH (ref 70–99)
GLUCOSE BLDC GLUCOMTR-MCNC: 211 MG/DL — HIGH (ref 70–99)
GLUCOSE SERPL-MCNC: 122 MG/DL — HIGH (ref 70–99)
GLUCOSE SERPL-MCNC: 212 MG/DL — HIGH (ref 70–99)
HCT VFR BLD CALC: 23.4 % — LOW (ref 37–47)
HCT VFR BLD CALC: 26.2 % — LOW (ref 37–47)
HGB BLD-MCNC: 7.5 G/DL — LOW (ref 12–16)
HGB BLD-MCNC: 8.7 G/DL — LOW (ref 12–16)
IMM GRANULOCYTES NFR BLD AUTO: 1.7 % — HIGH (ref 0.1–0.3)
IMM GRANULOCYTES NFR BLD AUTO: 2.3 % — HIGH (ref 0.1–0.3)
LYMPHOCYTES # BLD AUTO: 0.33 K/UL — LOW (ref 1.2–3.4)
LYMPHOCYTES # BLD AUTO: 0.94 K/UL — LOW (ref 1.2–3.4)
LYMPHOCYTES # BLD AUTO: 2.3 % — LOW (ref 20.5–51.1)
LYMPHOCYTES # BLD AUTO: 6.5 % — LOW (ref 20.5–51.1)
MAGNESIUM SERPL-MCNC: 2.1 MG/DL — SIGNIFICANT CHANGE UP (ref 1.8–2.4)
MAGNESIUM SERPL-MCNC: 2.2 MG/DL — SIGNIFICANT CHANGE UP (ref 1.8–2.4)
MCHC RBC-ENTMCNC: 29 PG — SIGNIFICANT CHANGE UP (ref 27–31)
MCHC RBC-ENTMCNC: 29.7 PG — SIGNIFICANT CHANGE UP (ref 27–31)
MCHC RBC-ENTMCNC: 32.1 G/DL — SIGNIFICANT CHANGE UP (ref 32–37)
MCHC RBC-ENTMCNC: 33.2 G/DL — SIGNIFICANT CHANGE UP (ref 32–37)
MCV RBC AUTO: 89.4 FL — SIGNIFICANT CHANGE UP (ref 81–99)
MCV RBC AUTO: 90.3 FL — SIGNIFICANT CHANGE UP (ref 81–99)
MONOCYTES # BLD AUTO: 0.4 K/UL — SIGNIFICANT CHANGE UP (ref 0.1–0.6)
MONOCYTES # BLD AUTO: 0.59 K/UL — SIGNIFICANT CHANGE UP (ref 0.1–0.6)
MONOCYTES NFR BLD AUTO: 2.8 % — SIGNIFICANT CHANGE UP (ref 1.7–9.3)
MONOCYTES NFR BLD AUTO: 4.1 % — SIGNIFICANT CHANGE UP (ref 1.7–9.3)
NEUTROPHILS # BLD AUTO: 12.63 K/UL — HIGH (ref 1.4–6.5)
NEUTROPHILS # BLD AUTO: 13.48 K/UL — HIGH (ref 1.4–6.5)
NEUTROPHILS NFR BLD AUTO: 86.9 % — HIGH (ref 42.2–75.2)
NEUTROPHILS NFR BLD AUTO: 93.1 % — HIGH (ref 42.2–75.2)
NRBC # BLD: 0 /100 WBCS — SIGNIFICANT CHANGE UP (ref 0–0)
NRBC # BLD: 0 /100 WBCS — SIGNIFICANT CHANGE UP (ref 0–0)
PHOSPHATE SERPL-MCNC: 3.4 MG/DL — SIGNIFICANT CHANGE UP (ref 2.1–4.9)
PLATELET # BLD AUTO: 166 K/UL — SIGNIFICANT CHANGE UP (ref 130–400)
PLATELET # BLD AUTO: 216 K/UL — SIGNIFICANT CHANGE UP (ref 130–400)
POTASSIUM SERPL-MCNC: 3.7 MMOL/L — SIGNIFICANT CHANGE UP (ref 3.5–5)
POTASSIUM SERPL-MCNC: 4.9 MMOL/L — SIGNIFICANT CHANGE UP (ref 3.5–5)
POTASSIUM SERPL-SCNC: 3.7 MMOL/L — SIGNIFICANT CHANGE UP (ref 3.5–5)
POTASSIUM SERPL-SCNC: 4.9 MMOL/L — SIGNIFICANT CHANGE UP (ref 3.5–5)
PROCALCITONIN SERPL-MCNC: 1.14 NG/ML — HIGH (ref 0.02–0.1)
PROT SERPL-MCNC: 4.1 G/DL — LOW (ref 6–8)
RBC # BLD: 2.59 M/UL — LOW (ref 4.2–5.4)
RBC # BLD: 2.93 M/UL — LOW (ref 4.2–5.4)
RBC # FLD: 14.5 % — SIGNIFICANT CHANGE UP (ref 11.5–14.5)
RBC # FLD: 15.1 % — HIGH (ref 11.5–14.5)
SODIUM SERPL-SCNC: 131 MMOL/L — LOW (ref 135–146)
SODIUM SERPL-SCNC: 136 MMOL/L — SIGNIFICANT CHANGE UP (ref 135–146)
WBC # BLD: 14.46 K/UL — HIGH (ref 4.8–10.8)
WBC # BLD: 14.53 K/UL — HIGH (ref 4.8–10.8)
WBC # FLD AUTO: 14.46 K/UL — HIGH (ref 4.8–10.8)
WBC # FLD AUTO: 14.53 K/UL — HIGH (ref 4.8–10.8)

## 2022-02-17 PROCEDURE — 93010 ELECTROCARDIOGRAM REPORT: CPT

## 2022-02-17 PROCEDURE — 99233 SBSQ HOSP IP/OBS HIGH 50: CPT

## 2022-02-17 PROCEDURE — 71045 X-RAY EXAM CHEST 1 VIEW: CPT | Mod: 26

## 2022-02-17 RX ORDER — POTASSIUM CHLORIDE 20 MEQ
20 PACKET (EA) ORAL
Refills: 0 | Status: COMPLETED | OUTPATIENT
Start: 2022-02-17 | End: 2022-02-17

## 2022-02-17 RX ORDER — LACTULOSE 10 G/15ML
10 SOLUTION ORAL
Refills: 0 | Status: DISCONTINUED | OUTPATIENT
Start: 2022-02-17 | End: 2022-02-19

## 2022-02-17 RX ORDER — FENTANYL CITRATE 50 UG/ML
0.59 INJECTION INTRAVENOUS
Qty: 2500 | Refills: 0 | Status: DISCONTINUED | OUTPATIENT
Start: 2022-02-17 | End: 2022-02-24

## 2022-02-17 RX ADMIN — CHLORHEXIDINE GLUCONATE 15 MILLILITER(S): 213 SOLUTION TOPICAL at 17:14

## 2022-02-17 RX ADMIN — FENTANYL CITRATE 2.25 MICROGRAM(S)/KG/HR: 50 INJECTION INTRAVENOUS at 17:43

## 2022-02-17 RX ADMIN — PIPERACILLIN AND TAZOBACTAM 200 GRAM(S): 4; .5 INJECTION, POWDER, LYOPHILIZED, FOR SOLUTION INTRAVENOUS at 05:14

## 2022-02-17 RX ADMIN — PIPERACILLIN AND TAZOBACTAM 200 GRAM(S): 4; .5 INJECTION, POWDER, LYOPHILIZED, FOR SOLUTION INTRAVENOUS at 17:15

## 2022-02-17 RX ADMIN — LACTULOSE 10 GRAM(S): 10 SOLUTION ORAL at 09:03

## 2022-02-17 RX ADMIN — HEPARIN SODIUM 5000 UNIT(S): 5000 INJECTION INTRAVENOUS; SUBCUTANEOUS at 05:14

## 2022-02-17 RX ADMIN — PIPERACILLIN AND TAZOBACTAM 200 GRAM(S): 4; .5 INJECTION, POWDER, LYOPHILIZED, FOR SOLUTION INTRAVENOUS at 11:14

## 2022-02-17 RX ADMIN — Medication 1 APPLICATION(S): at 05:08

## 2022-02-17 RX ADMIN — Medication 50 MILLIEQUIVALENT(S): at 06:19

## 2022-02-17 RX ADMIN — Medication 1 APPLICATION(S): at 11:57

## 2022-02-17 RX ADMIN — PANTOPRAZOLE SODIUM 40 MILLIGRAM(S): 20 TABLET, DELAYED RELEASE ORAL at 11:48

## 2022-02-17 RX ADMIN — LACTULOSE 10 GRAM(S): 10 SOLUTION ORAL at 17:14

## 2022-02-17 RX ADMIN — CHLORHEXIDINE GLUCONATE 1 APPLICATION(S): 213 SOLUTION TOPICAL at 05:08

## 2022-02-17 RX ADMIN — Medication 50 MILLIEQUIVALENT(S): at 09:03

## 2022-02-17 RX ADMIN — Medication 100 GRAM(S): at 03:30

## 2022-02-17 RX ADMIN — Medication 100 GRAM(S): at 10:59

## 2022-02-17 RX ADMIN — Medication 1 APPLICATION(S): at 17:16

## 2022-02-17 RX ADMIN — Medication 2: at 17:43

## 2022-02-17 RX ADMIN — Medication 50 MILLIEQUIVALENT(S): at 07:53

## 2022-02-17 RX ADMIN — Medication 60 MILLIGRAM(S): at 05:14

## 2022-02-17 RX ADMIN — HEPARIN SODIUM 5000 UNIT(S): 5000 INJECTION INTRAVENOUS; SUBCUTANEOUS at 17:14

## 2022-02-17 RX ADMIN — Medication 1: at 11:46

## 2022-02-17 RX ADMIN — CHLORHEXIDINE GLUCONATE 15 MILLILITER(S): 213 SOLUTION TOPICAL at 05:08

## 2022-02-17 NOTE — CHART NOTE - NSCHARTNOTEFT_GEN_A_CORE
87 y/o F w/ PMH/o HTN (as per son at bedside) presented to the hospital [facility] after experiencing respiratory distress. According to the patients family, the patient was being fed when she began choking and had difficulty breathing. Pt was bag masked by EMS and transported to the hospital. According to the family, the patient began to experience diffuse skin reaction across her entire body as well as ulcerations in her mouth for the past month. The family denied noticing fever or chills.    In the ED, patient was tachypneic and hypoxic,  intubated for airway protection. Pt was found to hypothermic, started on warming blanket. Pt started on IV levophed. S/p IV vanc/ cefepime in the ED. Derm and Burn were called to evaluate the patient's skin condition. The patient's sodium was noted to be 180. An A-line was also placed to monitor the patient's pressures.     While in the CCU the patient slowly had her sodium corrected via fluids. She was initially started on D5 and slowly transitioned to D51/2NS. The patient's skin lesions were also biopsied and showed bullous pemphigoid. She was started on steroids and a short course of doxycycline. Per ID the patient was to be switched to Zosyn. The patient's sugars became extremely high due to the combination of D5 and steroids so the patient was also started on an insulin drip to control the sugars. The patient's sugars eventually came under control and the insulin drip was stopped. She was transitioned to subq insulin. On 2/14 the patient went into an episode of Torsades. Her electrolytes were noted to be low and her QTc was 733. The patient was started on calcium gluconate 1g q8 and her electrolytes were corrected. She has not had an episode of torsades since. The patient has remained intubated since arrival with a slow weaning of sedation. She only remains on Fentanyl. Patient tested positive for COVID on 2/15. She is already on steroids and intubated. Patient on and off low dose Levo for pressure support.    Patient's family adamant that patient should remain full code.     TO FOLLOW UP:  #Electrolytes, must keep Mag >2 and K >4 to prevent torsades  #Follow up with Derm for steroids  #Follow up with ID for abx  #Follow up with Pulm for extubation  #Keep R IJ in patient if possible, blood draws are very difficult due to patient's skin ulcerations      87 y/o F w/ PMH/o HTN (as per son at bedside) presenting to the hospital [facility] after experiencing respiratory distress. According to the patients family, the patient was being fed when she began choking and had difficulty breathing. Pt was bag masked by EMS and transported to the hospital. According to the family, the patient began to experience diffuse skin reaction across her entire body as well as ulcerations in her mouth for the past month. The family denied noticing fever or chills.    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

## 2022-02-17 NOTE — PROGRESS NOTE ADULT - SUBJECTIVE AND OBJECTIVE BOX
ARI MA, 88y, Female; MRN# [id number]  Hospital Stay: 8d.    Patient is a 88y old Female who presents with a chief complaint of respiratory failure (11 Feb 2022 22:40)  Currently admitted to the ICU with the primary diagnosis of Acute respiratory failure with hypoxia      SUBJECTIVE:  Interval/Overnight events: No overnight events. Pt on 12/300/5/30%.     REVIEW OF SYSTEMS:  As per HPI  OBJECTIVE:  VITALS:  T(F): 98.2 (02-17-22 @ 04:00), Max: 98.9 (02-16-22 @ 12:00)  HR: 73 (02-17-22 @ 06:00) (63 - 84)  BP: --  ABP: 142/51 (02-17-22 @ 06:00) (102/40 - 202/64)  ABP(mean): 80 (02-17-22 @ 06:00) (58 - 116)  RR: 10 (02-17-22 @ 06:00) (10 - 17)  SpO2: 99% (02-17-22 @ 06:00) (99% - 100%)    Vent Settings  Device: Avea, Mode: AC/ CMV (Assist Control/ Continuous Mandatory Ventilation), RR (machine): 12, TV (machine): 300, FiO2: 30, PEEP: 5, ITime: 1, MAP: 9, PIP: 11  Blood Gas  02-17-22 @ 05:18  pH 7.48 | pCO2 35 | pO2 165 | HCO3 26 | O2 Sat 99.5  02-16-22 @ 12:33  pH 7.46 | pCO2 36 | pO2 126 | HCO3 26 | O2 Sat 99.6    Drips  dextrose 5%. 1000 milliLiter(s) (50 mL/Hr) IV Continuous <Continuous>  dextrose 5%. 1000 milliLiter(s) (100 mL/Hr) IV Continuous <Continuous>  fentaNYL   Infusion. 0.587 MICROgram(s)/kG/Hr (2.25 mL/Hr) IV Continuous <Continuous>  norepinephrine Infusion 0.05 MICROgram(s)/kG/Min (4.22 mL/Hr) IV Continuous <Continuous>    I&Os:    02-16-22 @ 07:01  -  02-17-22 @ 07:00  --------------------------------------------------------  IN: 2987.8 mL / OUT: 2460 mL / NET: 527.8 mL      PHYSICAL EXAM:  CONST: well appearing for age  NECK:  supple, + R IJ TLC  CARDIAC:  regular rate and rhythm, normal S1 and S2  RESP:  respiratory rate and effort appear normal for age  ABDOMEN:  soft, nontender  MUSCULOSKELETAL/NEURO:  normal movement, normal tone  SKIN:  + diffuse bullae    ACTIVE MEDICATIONS:  Standing  calcium gluconate IVPB 1 Gram(s) IV Intermittent <User Schedule>  chlorhexidine 0.12% Liquid 15 milliLiter(s) Oral Mucosa every 12 hours  chlorhexidine 4% Liquid 1 Application(s) Topical <User Schedule>  collagenase Ointment 1 Application(s) Topical two times a day  dextrose 40% Gel 15 Gram(s) Oral once  dextrose 5%. 1000 milliLiter(s) (50 mL/Hr) IV Continuous <Continuous>  dextrose 5%. 1000 milliLiter(s) (100 mL/Hr) IV Continuous <Continuous>  dextrose 50% Injectable 25 Gram(s) IV Push once  dextrose 50% Injectable 12.5 Gram(s) IV Push once  dextrose 50% Injectable 25 Gram(s) IV Push once  fentaNYL   Infusion. 0.587 MICROgram(s)/kG/Hr (2.25 mL/Hr) IV Continuous <Continuous>  glucagon  Injectable 1 milliGRAM(s) IntraMuscular once  heparin   Injectable 5000 Unit(s) SubCutaneous every 12 hours  influenza  Vaccine (HIGH DOSE) 0.7 milliLiter(s) IntraMuscular once  insulin lispro (ADMELOG) corrective regimen sliding scale   SubCutaneous every 6 hours  lactulose Syrup 10 Gram(s) Oral two times a day  norepinephrine Infusion 0.05 MICROgram(s)/kG/Min (4.22 mL/Hr) IV Continuous <Continuous>  pantoprazole  Injectable 40 milliGRAM(s) IV Push daily  piperacillin/tazobactam IVPB.. 2.25 Gram(s) IV Intermittent every 6 hours  potassium chloride  20 mEq/100 mL IVPB 20 milliEquivalent(s) IV Intermittent every 2 hours  predniSONE   Tablet 60 milliGRAM(s) Oral daily  vitamin A &amp; D Ointment 1 Application(s) Topical daily    PRN  morphine  - Injectable 2 milliGRAM(s) IV Push two times a day PRN    LABS:  02-17-22 @ 04:10  WBC 14.53<H>, H/H 7.5<L>/23.4<L>, plt 166  Na 136, K 3.7, Cl 102, CO2 25, BUN 36<H>, Cr 0.6<L>, Glu 122<H>    Ca 6.7<L>, Mg 2.2, Phosphorus 3.4    Tot Protein 4.1<L>, Alb 1.6<L>, T. Bili 0.8, D. Bili --  <H>, <H>, Alk phos 113    02-16-22 @ 16:00  WBC --, H/H --/--, plt --  Na 136, K 4.6, Cl 101, CO2 23, BUN 34<H>, Cr 0.6<L>, Glu 199<H>    Ca 6.7<L>, Mg 2.7<H>, Phosphorus --    Tot Protein 4.1<L>, Alb 1.6<L>, T. Bili 1.0, D. Bili --  <H>, <H>, Alk phos 120<H>        02-16-22 @ 08:19:  CRP --, Ferritin --, D-dimer 3908<H>, Procal --, Fungitell --, BNP --    02-10-22 @ 04:30:  TSH 1.39      02-10-22 @ 04:30:  Hgb A1c 6.9<H>% | Mean plasma glucose 151<H>    02-10-22 @ 04:30:  Total Chol 83 | Non-HDL 69 | HDL 14<L>  LDL-Direct -- | LDL-Calc 19 | <H>    02-10-22 @ 04:30:  Troponin T 0.13<HH>  02-09-22 @ 17:41:  Troponin T 0.12<HH>  02-08-22 @ 23:55:  Troponin T 0.10<HH>    02-15-22 @ 05:30:  COVID-19 PCR: Detected  02-09-22 @ 00:00:  COVID-19 PCR: NotDetec        CULTURES:    Culture - Sputum (collected 02-09-22 @ 18:42)  Source: .Sputum Sputum  Gram Stain (02-10-22 @ 07:17):    Moderate polymorphonuclear leukocytes per low power field    No Squamous epithelial cells per low power field    No organisms seen per oil power field  Final Report (02-12-22 @ 15:04):    Rare Pseudomonas aeruginosa    Normal Respiratory Candida absent  Organism: Pseudomonas aeruginosa (02-12-22 @ 15:04)  Organism: Pseudomonas aeruginosa (02-12-22 @ 15:04)      -  Amikacin: S <=16      -  Aztreonam: S <=4      -  Cefepime: S 4      -  Ceftazidime: S 4      -  Ciprofloxacin: S <=0.25      -  Gentamicin: S <=2      -  Imipenem: S <=1      -  Levofloxacin: S <=0.5      -  Meropenem: S <=1      -  Piperacillin/Tazobactam: S <=8      -  Tobramycin: S <=2      Method Type: MARISELA    Culture - Urine (collected 02-09-22 @ 00:00)  Source: Clean Catch Clean Catch (Midstream)  Final Report (02-10-22 @ 06:28):    No growth    Culture - Blood (collected 02-08-22 @ 23:55)  Source: .Blood Blood  Final Report (02-14-22 @ 07:00):    No Growth Final    Culture - Blood (collected 02-08-22 @ 23:55)  Source: .Blood Blood  Final Report (02-14-22 @ 07:00):    No Growth Final      IMAGING:  < from: Xray Chest 1 View-PORTABLE IMMEDIATE (Xray Chest 1 View-PORTABLE IMMEDIATE .) (02.16.22 @ 08:57) >  FINDINGS/  IMPRESSION:    Stable lines and tubes grossly in satisfactory position.    Unchanged bibasal opacities. No pneumothorax.    Stable cardiomediastinal silhouette.    Unchanged osseous structures.    < end of copied text >    ECHO:  < from: TTE Echo Complete w/o Contrast w/ Doppler (02.14.22 @ 11:31) >  Summary:   1. Left ventricular ejection fraction, by visual estimation, is 60 to   65%.   2. Normal global left ventricular systolic function.   3. Spectral Doppler shows impaired relaxation pattern of left   ventricular myocardial filling (Grade I diastolic dysfunction).    < end of copied text >

## 2022-02-17 NOTE — PROGRESS NOTE ADULT - ASSESSMENT
· Assessment	  87 y/o F w/ PMH/o HTN (as per son at bedside) presenting to the hospital BIBA after experiencing respiratory distress. According to the patients family, the patient was being fed when she began choking and had difficulty breathing. Pt was bag masked by EMS and transported to the hospital. According to the family, the patient began to experience diffuse bullae across her entire body as well as ulcerations in her mouth for the past month. The family denied noticing fever or chills.  In the ED, patient was tachypneic and hypoxic,  intubated for airway protection. Pt was found to hypothermic, started on warming blanket. Pt started on IV levophed. S/p IV vanc/ cefepime.    IMPRESSION;  Diffuse bullae : s/p Bx 2/9 > bullous pemphigoid  MSOF  JILL > resolved  Aspiration with chemical pneumonitis   2/9 Sputum > P aeruginosa > colonizer ?  No bacterial PNA on CXR  2/8 BCx NG  2/9 UCx NG    COVID-19 NG 1/9  COVID-19 positive 1/15  Ddimers 3908  CXR no new GGO  Pt is in the early viral replicative phase based on the timeline/onset of symptoms.   Given that she is on a vent see no indication for RDV  As there has been no change in fio2 will not recommend steroids    RECOMMENDATIONS;  CT angio : r/o PE  Zosyn 2.250 gm iv q6h  No RDV/steroids  Off loading to prevent pressure sores and preventive measures to avoid aspiration   If any questions , please call [phone] or send a message on [facility] teams  Please update ID in real time with any pertinent new laboratory /microbiological/radiographically findings or a change in the clinical characteristics

## 2022-02-17 NOTE — PROGRESS NOTE ADULT - SUBJECTIVE AND OBJECTIVE BOX
Patient is a 88y old  Female who presents with a chief complaint of respiratory failure (11 Feb 2022 22:40)      Over Night Events:  Patient seen and examined.   failed weaning trial yesterday also still no significant cuff leak     ROS:  See HPI    PHYSICAL EXAM    ICU Vital Signs Last 24 Hrs  T(C): 36.8 (17 Feb 2022 04:00), Max: 37.2 (16 Feb 2022 12:00)  T(F): 98.2 (17 Feb 2022 04:00), Max: 98.9 (16 Feb 2022 12:00)  HR: 73 (17 Feb 2022 06:00) (63 - 84)  BP: --  BP(mean): --  ABP: 142/51 (17 Feb 2022 06:00) (102/40 - 202/64)  ABP(mean): 80 (17 Feb 2022 06:00) (58 - 116)  RR: 10 (17 Feb 2022 06:00) (10 - 17)  SpO2: 99% (17 Feb 2022 06:00) (99% - 100%)      General: open eyes very weak when sedation lowered   HEENT:   et tube              Lymph Nodes: NO cervical LN   Lungs: Bilateral BS  Cardiovascular: Regular   Abdomen: Soft, Positive BS  Extremities: No clubbing   Skin: warm   Neurological: no focal   Musculoskeletal: move all ext     I&O's Detail    16 Feb 2022 07:01  -  17 Feb 2022 07:00  --------------------------------------------------------  IN:    FentaNYL: 219 mL    FentaNYL: 46 mL    Free Water: 1500 mL    Glucerna 1.5: 770 mL    IV PiggyBack: 450 mL    Norepinephrine: 2.8 mL  Total IN: 2987.8 mL    OUT:    Indwelling Catheter - Urethral (mL): 2460 mL    Stool (mL): 0 mL  Total OUT: 2460 mL    Total NET: 527.8 mL          LABS:                          7.5    14.53 )-----------( 166      ( 17 Feb 2022 04:10 )             23.4         17 Feb 2022 04:10    136    |  102    |  36     ----------------------------<  122    3.7     |  25     |  0.6      Ca    6.7        17 Feb 2022 04:10  Phos  3.4       17 Feb 2022 04:10  Mg     2.2       17 Feb 2022 04:10    TPro  4.1    /  Alb  1.6    /  TBili  0.8    /  DBili  x      /  AST  103    /  ALT  123    /  AlkPhos  113    17 Feb 2022 04:10  Amylase x     lipase x                                                                                                                                                                                                 Mode: AC/ CMV (Assist Control/ Continuous Mandatory Ventilation)  RR (machine): 12  TV (machine): 300  FiO2: 30  PEEP: 5  ITime: 1  MAP: 9  PIP: 11                                      ABG - ( 17 Feb 2022 05:18 )  pH, Arterial: 7.48  pH, Blood: x     /  pCO2: 35    /  pO2: 165   / HCO3: 26    / Base Excess: 2.6   /  SaO2: 99.5                MEDICATIONS  (STANDING):  calcium gluconate IVPB 1 Gram(s) IV Intermittent <User Schedule>  chlorhexidine 0.12% Liquid 15 milliLiter(s) Oral Mucosa every 12 hours  chlorhexidine 4% Liquid 1 Application(s) Topical <User Schedule>  collagenase Ointment 1 Application(s) Topical two times a day  dextrose 40% Gel 15 Gram(s) Oral once  dextrose 5%. 1000 milliLiter(s) (50 mL/Hr) IV Continuous <Continuous>  dextrose 5%. 1000 milliLiter(s) (100 mL/Hr) IV Continuous <Continuous>  dextrose 50% Injectable 25 Gram(s) IV Push once  dextrose 50% Injectable 12.5 Gram(s) IV Push once  dextrose 50% Injectable 25 Gram(s) IV Push once  fentaNYL   Infusion. 0.587 MICROgram(s)/kG/Hr (2.25 mL/Hr) IV Continuous <Continuous>  glucagon  Injectable 1 milliGRAM(s) IntraMuscular once  heparin   Injectable 5000 Unit(s) SubCutaneous every 12 hours  influenza  Vaccine (HIGH DOSE) 0.7 milliLiter(s) IntraMuscular once  insulin lispro (ADMELOG) corrective regimen sliding scale   SubCutaneous every 6 hours  lactulose Syrup 10 Gram(s) Oral two times a day  norepinephrine Infusion 0.05 MICROgram(s)/kG/Min (4.22 mL/Hr) IV Continuous <Continuous>  pantoprazole  Injectable 40 milliGRAM(s) IV Push daily  piperacillin/tazobactam IVPB.. 2.25 Gram(s) IV Intermittent every 6 hours  potassium chloride  20 mEq/100 mL IVPB 20 milliEquivalent(s) IV Intermittent every 2 hours  predniSONE   Tablet 60 milliGRAM(s) Oral daily  vitamin A &amp; D Ointment 1 Application(s) Topical daily    MEDICATIONS  (PRN):  morphine  - Injectable 2 milliGRAM(s) IV Push two times a day PRN Severe Pain (7 - 10)          Xrays:  TLC:  OG:  ET tube:                                                                                    bibasilar mild opacity    ECHO:  [facility] ICU:

## 2022-02-17 NOTE — PROGRESS NOTE ADULT - SUBJECTIVE AND OBJECTIVE BOX
ARI MA  88y, Female    All available historical data reviewed    OVERNIGHT EVENTS:  no fevers  fio2 30%  one BM  off pressors    ROS:  unable to obtain history secondary to patient's mental status and/or sedation     VITALS:  T(F): 98.2, Max: 98.9 (02-16-22 @ 12:00)  HR: 67  BP: --  RR: 12Vital Signs Last 24 Hrs  T(C): 36.8 (17 Feb 2022 04:00), Max: 37.2 (16 Feb 2022 12:00)  T(F): 98.2 (17 Feb 2022 04:00), Max: 98.9 (16 Feb 2022 12:00)  HR: 67 (17 Feb 2022 08:00) (63 - 84)  BP: --  BP(mean): --  RR: 12 (17 Feb 2022 08:00) (10 - 17)  SpO2: 100% (17 Feb 2022 08:00) (99% - 100%)    TESTS & MEASUREMENTS:                        7.5    14.53 )-----------( 166      ( 17 Feb 2022 04:10 )             23.4     02-17    136  |  102  |  36<H>  ----------------------------<  122<H>  3.7   |  25  |  0.6<L>    Ca    6.7<L>      17 Feb 2022 04:10  Phos  3.4     02-17  Mg     2.2     02-17    TPro  4.1<L>  /  Alb  1.6<L>  /  TBili  0.8  /  DBili  x   /  AST  103<H>  /  ALT  123<H>  /  AlkPhos  113  02-17    LIVER FUNCTIONS - ( 17 Feb 2022 04:10 )  Alb: 1.6 g/dL / Pro: 4.1 g/dL / ALK PHOS: 113 U/L / ALT: 123 U/L / AST: 103 U/L / GGT: x                   RADIOLOGY & ADDITIONAL TESTS:  Personal review of radiological diagnostics performed  Echo and EKG results noted when applicable.     MEDICATIONS:  calcium gluconate IVPB 1 Gram(s) IV Intermittent <User Schedule>  chlorhexidine 0.12% Liquid 15 milliLiter(s) Oral Mucosa every 12 hours  chlorhexidine 4% Liquid 1 Application(s) Topical <User Schedule>  collagenase Ointment 1 Application(s) Topical two times a day  dextrose 40% Gel 15 Gram(s) Oral once  dextrose 5%. 1000 milliLiter(s) IV Continuous <Continuous>  dextrose 5%. 1000 milliLiter(s) IV Continuous <Continuous>  dextrose 50% Injectable 25 Gram(s) IV Push once  dextrose 50% Injectable 12.5 Gram(s) IV Push once  dextrose 50% Injectable 25 Gram(s) IV Push once  fentaNYL   Infusion. 0.587 MICROgram(s)/kG/Hr IV Continuous <Continuous>  glucagon  Injectable 1 milliGRAM(s) IntraMuscular once  heparin   Injectable 5000 Unit(s) SubCutaneous every 12 hours  influenza  Vaccine (HIGH DOSE) 0.7 milliLiter(s) IntraMuscular once  insulin lispro (ADMELOG) corrective regimen sliding scale   SubCutaneous every 6 hours  lactulose Syrup 10 Gram(s) Oral two times a day  morphine  - Injectable 2 milliGRAM(s) IV Push two times a day PRN  norepinephrine Infusion 0.05 MICROgram(s)/kG/Min IV Continuous <Continuous>  pantoprazole  Injectable 40 milliGRAM(s) IV Push daily  piperacillin/tazobactam IVPB.. 2.25 Gram(s) IV Intermittent every 6 hours  potassium chloride  20 mEq/100 mL IVPB 20 milliEquivalent(s) IV Intermittent every 2 hours  predniSONE   Tablet 60 milliGRAM(s) Oral daily  vitamin A &amp; D Ointment 1 Application(s) Topical daily      ANTIBIOTICS:  piperacillin/tazobactam IVPB.. 2.25 Gram(s) IV Intermittent every 6 hours

## 2022-02-17 NOTE — PROGRESS NOTE ADULT - ASSESSMENT
89 y/o F w/ PMH/o HTN (as per son at bedside) presenting to the hospital BIBA after experiencing respiratory distress. According to the patients family, the patient was being fed when she began choking and had difficulty breathing. Pt was bag masked by EMS and transported to the hospital. According to the family, the patient began to experience diffuse skin reaction across her entire body as well as ulcerations in her mouth for the past month. The family denied noticing fever or chills.    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 According to the patients family, the patient was being fed when she began choking and had difficulty breathing. Pt was bag masked by EMS and transported to the hospital. According to the family, the patient began to experience diffuse skin reaction across her entire body as well as ulcerations in her mouth for the past month. The family denied noticing fever or chills.    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

## 2022-02-18 LAB
ALBUMIN SERPL ELPH-MCNC: 1.9 G/DL — LOW (ref 3.5–5.2)
ALP SERPL-CCNC: 125 U/L — HIGH (ref 30–115)
ALT FLD-CCNC: 135 U/L — HIGH (ref 0–41)
ANION GAP SERPL CALC-SCNC: 12 MMOL/L — SIGNIFICANT CHANGE UP (ref 7–14)
AST SERPL-CCNC: 91 U/L — HIGH (ref 0–41)
BASOPHILS # BLD AUTO: 0.01 K/UL — SIGNIFICANT CHANGE UP (ref 0–0.2)
BASOPHILS NFR BLD AUTO: 0.1 % — SIGNIFICANT CHANGE UP (ref 0–1)
BILIRUB SERPL-MCNC: 1.3 MG/DL — HIGH (ref 0.2–1.2)
BUN SERPL-MCNC: 31 MG/DL — HIGH (ref 10–20)
CALCIUM SERPL-MCNC: 7.1 MG/DL — LOW (ref 8.5–10.1)
CHLORIDE SERPL-SCNC: 100 MMOL/L — SIGNIFICANT CHANGE UP (ref 98–110)
CO2 SERPL-SCNC: 25 MMOL/L — SIGNIFICANT CHANGE UP (ref 17–32)
CREAT SERPL-MCNC: 0.5 MG/DL — LOW (ref 0.7–1.5)
EOSINOPHIL # BLD AUTO: 0.02 K/UL — SIGNIFICANT CHANGE UP (ref 0–0.7)
EOSINOPHIL NFR BLD AUTO: 0.1 % — SIGNIFICANT CHANGE UP (ref 0–8)
GLUCOSE BLDC GLUCOMTR-MCNC: 112 MG/DL — HIGH (ref 70–99)
GLUCOSE BLDC GLUCOMTR-MCNC: 122 MG/DL — HIGH (ref 70–99)
GLUCOSE BLDC GLUCOMTR-MCNC: 124 MG/DL — HIGH (ref 70–99)
GLUCOSE BLDC GLUCOMTR-MCNC: 127 MG/DL — HIGH (ref 70–99)
GLUCOSE BLDC GLUCOMTR-MCNC: 144 MG/DL — HIGH (ref 70–99)
GLUCOSE BLDC GLUCOMTR-MCNC: 93 MG/DL — SIGNIFICANT CHANGE UP (ref 70–99)
GLUCOSE SERPL-MCNC: 74 MG/DL — SIGNIFICANT CHANGE UP (ref 70–99)
HCT VFR BLD CALC: 25.1 % — LOW (ref 37–47)
HGB BLD-MCNC: 8.2 G/DL — LOW (ref 12–16)
IMM GRANULOCYTES NFR BLD AUTO: 2 % — HIGH (ref 0.1–0.3)
LYMPHOCYTES # BLD AUTO: 0.83 K/UL — LOW (ref 1.2–3.4)
LYMPHOCYTES # BLD AUTO: 5.7 % — LOW (ref 20.5–51.1)
MAGNESIUM SERPL-MCNC: 1.9 MG/DL — SIGNIFICANT CHANGE UP (ref 1.8–2.4)
MCHC RBC-ENTMCNC: 29.7 PG — SIGNIFICANT CHANGE UP (ref 27–31)
MCHC RBC-ENTMCNC: 32.7 G/DL — SIGNIFICANT CHANGE UP (ref 32–37)
MCV RBC AUTO: 90.9 FL — SIGNIFICANT CHANGE UP (ref 81–99)
MONOCYTES # BLD AUTO: 1.08 K/UL — HIGH (ref 0.1–0.6)
MONOCYTES NFR BLD AUTO: 7.4 % — SIGNIFICANT CHANGE UP (ref 1.7–9.3)
NEUTROPHILS # BLD AUTO: 12.38 K/UL — HIGH (ref 1.4–6.5)
NEUTROPHILS NFR BLD AUTO: 84.7 % — HIGH (ref 42.2–75.2)
NRBC # BLD: 0 /100 WBCS — SIGNIFICANT CHANGE UP (ref 0–0)
PHOSPHATE SERPL-MCNC: 3.7 MG/DL — SIGNIFICANT CHANGE UP (ref 2.1–4.9)
PLATELET # BLD AUTO: 228 K/UL — SIGNIFICANT CHANGE UP (ref 130–400)
POTASSIUM SERPL-MCNC: 4.2 MMOL/L — SIGNIFICANT CHANGE UP (ref 3.5–5)
POTASSIUM SERPL-SCNC: 4.2 MMOL/L — SIGNIFICANT CHANGE UP (ref 3.5–5)
PROT SERPL-MCNC: 4.3 G/DL — LOW (ref 6–8)
RBC # BLD: 2.76 M/UL — LOW (ref 4.2–5.4)
RBC # FLD: 15.6 % — HIGH (ref 11.5–14.5)
SODIUM SERPL-SCNC: 137 MMOL/L — SIGNIFICANT CHANGE UP (ref 135–146)
WBC # BLD: 14.61 K/UL — HIGH (ref 4.8–10.8)
WBC # FLD AUTO: 14.61 K/UL — HIGH (ref 4.8–10.8)

## 2022-02-18 PROCEDURE — 93010 ELECTROCARDIOGRAM REPORT: CPT

## 2022-02-18 PROCEDURE — 71275 CT ANGIOGRAPHY CHEST: CPT | Mod: 26

## 2022-02-18 PROCEDURE — 93970 EXTREMITY STUDY: CPT | Mod: 26

## 2022-02-18 PROCEDURE — 93010 ELECTROCARDIOGRAM REPORT: CPT | Mod: 77

## 2022-02-18 PROCEDURE — 99291 CRITICAL CARE FIRST HOUR: CPT

## 2022-02-18 PROCEDURE — 71045 X-RAY EXAM CHEST 1 VIEW: CPT | Mod: 26

## 2022-02-18 PROCEDURE — 99233 SBSQ HOSP IP/OBS HIGH 50: CPT

## 2022-02-18 RX ORDER — MAGNESIUM SULFATE 500 MG/ML
2 VIAL (ML) INJECTION ONCE
Refills: 0 | Status: COMPLETED | OUTPATIENT
Start: 2022-02-18 | End: 2022-02-18

## 2022-02-18 RX ORDER — HYDROMORPHONE HYDROCHLORIDE 2 MG/ML
1 INJECTION INTRAMUSCULAR; INTRAVENOUS; SUBCUTANEOUS ONCE
Refills: 0 | Status: DISCONTINUED | OUTPATIENT
Start: 2022-02-18 | End: 2022-02-18

## 2022-02-18 RX ORDER — LABETALOL HCL 100 MG
10 TABLET ORAL ONCE
Refills: 0 | Status: COMPLETED | OUTPATIENT
Start: 2022-02-18 | End: 2022-02-18

## 2022-02-18 RX ORDER — POLYETHYLENE GLYCOL 3350 17 G/17G
17 POWDER, FOR SOLUTION ORAL DAILY
Refills: 0 | Status: DISCONTINUED | OUTPATIENT
Start: 2022-02-18 | End: 2022-02-19

## 2022-02-18 RX ADMIN — PIPERACILLIN AND TAZOBACTAM 200 GRAM(S): 4; .5 INJECTION, POWDER, LYOPHILIZED, FOR SOLUTION INTRAVENOUS at 18:31

## 2022-02-18 RX ADMIN — Medication 100 GRAM(S): at 18:21

## 2022-02-18 RX ADMIN — CHLORHEXIDINE GLUCONATE 15 MILLILITER(S): 213 SOLUTION TOPICAL at 06:29

## 2022-02-18 RX ADMIN — LACTULOSE 10 GRAM(S): 10 SOLUTION ORAL at 18:01

## 2022-02-18 RX ADMIN — CHLORHEXIDINE GLUCONATE 1 APPLICATION(S): 213 SOLUTION TOPICAL at 06:29

## 2022-02-18 RX ADMIN — Medication 100 GRAM(S): at 02:36

## 2022-02-18 RX ADMIN — POLYETHYLENE GLYCOL 3350 17 GRAM(S): 17 POWDER, FOR SOLUTION ORAL at 13:15

## 2022-02-18 RX ADMIN — MORPHINE SULFATE 2 MILLIGRAM(S): 50 CAPSULE, EXTENDED RELEASE ORAL at 21:45

## 2022-02-18 RX ADMIN — Medication 100 GRAM(S): at 11:13

## 2022-02-18 RX ADMIN — PIPERACILLIN AND TAZOBACTAM 200 GRAM(S): 4; .5 INJECTION, POWDER, LYOPHILIZED, FOR SOLUTION INTRAVENOUS at 11:29

## 2022-02-18 RX ADMIN — CHLORHEXIDINE GLUCONATE 15 MILLILITER(S): 213 SOLUTION TOPICAL at 18:01

## 2022-02-18 RX ADMIN — Medication 10 MILLIGRAM(S): at 20:20

## 2022-02-18 RX ADMIN — PIPERACILLIN AND TAZOBACTAM 200 GRAM(S): 4; .5 INJECTION, POWDER, LYOPHILIZED, FOR SOLUTION INTRAVENOUS at 23:13

## 2022-02-18 RX ADMIN — HYDROMORPHONE HYDROCHLORIDE 1 MILLIGRAM(S): 2 INJECTION INTRAMUSCULAR; INTRAVENOUS; SUBCUTANEOUS at 23:15

## 2022-02-18 RX ADMIN — PANTOPRAZOLE SODIUM 40 MILLIGRAM(S): 20 TABLET, DELAYED RELEASE ORAL at 11:29

## 2022-02-18 RX ADMIN — HEPARIN SODIUM 5000 UNIT(S): 5000 INJECTION INTRAVENOUS; SUBCUTANEOUS at 18:01

## 2022-02-18 RX ADMIN — MORPHINE SULFATE 2 MILLIGRAM(S): 50 CAPSULE, EXTENDED RELEASE ORAL at 03:38

## 2022-02-18 RX ADMIN — Medication 1 APPLICATION(S): at 18:31

## 2022-02-18 RX ADMIN — Medication 25 GRAM(S): at 09:11

## 2022-02-18 RX ADMIN — FENTANYL CITRATE 2.25 MICROGRAM(S)/KG/HR: 50 INJECTION INTRAVENOUS at 19:45

## 2022-02-18 RX ADMIN — HYDROMORPHONE HYDROCHLORIDE 1 MILLIGRAM(S): 2 INJECTION INTRAMUSCULAR; INTRAVENOUS; SUBCUTANEOUS at 23:30

## 2022-02-18 RX ADMIN — Medication 1 APPLICATION(S): at 06:50

## 2022-02-18 RX ADMIN — MORPHINE SULFATE 2 MILLIGRAM(S): 50 CAPSULE, EXTENDED RELEASE ORAL at 22:00

## 2022-02-18 NOTE — PROGRESS NOTE ADULT - SUBJECTIVE AND OBJECTIVE BOX
ARI MA, 88y, Female; MRN# [id number]  Hospital Stay: 9d.    Patient is a 88y old Female who presents with a chief complaint of respiratory failure (11 Feb 2022 22:40)  Currently admitted to the Burn ICU with the primary diagnosis of Acute respiratory failure with hypoxia      87 y/o F w/ PMH/o HTN (as per son at bedside) presented to the hospital [facility] after experiencing respiratory distress. According to the patients family, the patient was being fed when she began choking and had difficulty breathing. Pt was bag masked by EMS and transported to the hospital. According to the family, the patient began to experience diffuse skin reaction across her entire body as well as ulcerations in her mouth for the past month. The family denied noticing fever or chills.    In the ED, patient was tachypneic and hypoxic,  intubated for airway protection. Pt was found to hypothermic, started on warming blanket. Pt started on IV levophed. S/p IV vanc/ cefepime in the ED. Derm and Burn were called to evaluate the patient's skin condition. The patient's sodium was noted to be 180. An A-line was also placed to monitor the patient's pressures.     While in the CCU the patient slowly had her sodium corrected via fluids. She was initially started on D5 and slowly transitioned to D51/2NS. The patient's skin lesions were also biopsied and showed bullous pemphigoid. She was started on steroids and a short course of doxycycline. Per ID the patient was to be switched to Zosyn. The patient's sugars became extremely high due to the combination of D5 and steroids so the patient was also started on an insulin drip to control the sugars. The patient's sugars eventually came under control and the insulin drip was stopped. She was transitioned to subq insulin. On 2/14 the patient went into an episode of Torsades. Her electrolytes were noted to be low and her QTc was 733. The patient was started on calcium gluconate 1g q8 and her electrolytes were corrected. She has not had an episode of torsades since. The patient has remained intubated since arrival with a slow weaning of sedation. She only remains on Fentanyl. Patient tested positive for COVID on 2/15. She is already on steroids and intubated. Patient on and off low dose Levo for pressure support.    Patient's family adamant that patient should remain full code.       SUBJECTIVE:  Interval/Overnight events: No overnight events. Pt on 12/300/5/30%.     REVIEW OF SYSTEMS:  As per HPI  OBJECTIVE:  VITALS:  ICU Vital Signs Last 24 Hrs  T(C): 35.9 (18 Feb 2022 08:00), Max: 36.6 (17 Feb 2022 19:00)  T(F): 96.7 (18 Feb 2022 08:00), Max: 97.8 (17 Feb 2022 19:00)  HR: 73 (18 Feb 2022 09:00) (62 - 83)  BP: 152/70 (18 Feb 2022 09:00) (103/51 - 190/82)  BP(mean): 100 (18 Feb 2022 09:00) (73 - 118)  ABP: 102/42 (17 Feb 2022 10:00) (102/42 - 102/42)  ABP(mean): 62 (17 Feb 2022 10:00) (62 - 62)  RR: 9 (18 Feb 2022 09:00) (9 - 21)  SpO2: 100% (18 Feb 2022 09:00) (98% - 100%)      LABS:                        8.2    14.61 )-----------( 228      ( 18 Feb 2022 07:30 )             25.1     02-18    137  |  100  |  31<H>  ----------------------------<  74  4.2   |  25  |  0.5<L>    Ca    7.1<L>      18 Feb 2022 07:30  Phos  3.7     02-18  Mg     1.9     02-18    TPro  4.3<L>  /  Alb  1.9<L>  /  TBili  1.3<H>  /  DBili  x   /  AST  91<H>  /  ALT  135<H>  /  AlkPhos  125<H>  02-18      MEDICATIONS  (STANDING):  calcium gluconate IVPB 1 Gram(s) IV Intermittent <User Schedule>  chlorhexidine 0.12% Liquid 15 milliLiter(s) Oral Mucosa every 12 hours  chlorhexidine 4% Liquid 1 Application(s) Topical <User Schedule>  collagenase Ointment 1 Application(s) Topical two times a day  dextrose 40% Gel 15 Gram(s) Oral once  dextrose 5%. 1000 milliLiter(s) (50 mL/Hr) IV Continuous <Continuous>  dextrose 5%. 1000 milliLiter(s) (100 mL/Hr) IV Continuous <Continuous>  dextrose 50% Injectable 25 Gram(s) IV Push once  dextrose 50% Injectable 12.5 Gram(s) IV Push once  dextrose 50% Injectable 25 Gram(s) IV Push once  fentaNYL   Infusion. 0.587 MICROgram(s)/kG/Hr (2.25 mL/Hr) IV Continuous <Continuous>  glucagon  Injectable 1 milliGRAM(s) IntraMuscular once  heparin   Injectable 5000 Unit(s) SubCutaneous every 12 hours  influenza  Vaccine (HIGH DOSE) 0.7 milliLiter(s) IntraMuscular once  insulin lispro (ADMELOG) corrective regimen sliding scale   SubCutaneous every 6 hours  lactulose Syrup 10 Gram(s) Oral two times a day  norepinephrine Infusion 0.05 MICROgram(s)/kG/Min (4.22 mL/Hr) IV Continuous <Continuous>  pantoprazole  Injectable 40 milliGRAM(s) IV Push daily  piperacillin/tazobactam IVPB.. 2.25 Gram(s) IV Intermittent every 6 hours  predniSONE   Tablet 60 milliGRAM(s) Oral daily  vitamin A &amp; D Ointment 1 Application(s) Topical daily    MEDICATIONS  (PRN):  morphine  - Injectable 2 milliGRAM(s) IV Push two times a day PRN Severe Pain (7 - 10)      PHYSICAL EXAM:  CONST: well appearing for age  NECK:  supple, + R IJ TLC  CARDIAC:  regular rate and rhythm, normal S1 and S2  RESP:  respiratory rate and effort appear normal for age  ABDOMEN:  soft, nontender  MUSCULOSKELETAL/NEURO:  normal movement, normal tone  SKIN:  + diffuse bullae        IMAGING:  < from: Xray Chest 1 View-PORTABLE IMMEDIATE (Xray Chest 1 View-PORTABLE IMMEDIATE .) (02.16.22 @ 08:57) >  FINDINGS/  IMPRESSION:    Stable lines and tubes grossly in satisfactory position.    Unchanged bibasal opacities. No pneumothorax.    Stable cardiomediastinal silhouette.    Unchanged osseous structures.    < end of copied text >    ECHO:  < from: TTE Echo Complete w/o Contrast w/ Doppler (02.14.22 @ 11:31) >  Summary:   1. Left ventricular ejection fraction, by visual estimation, is 60 to   65%.   2. Normal global left ventricular systolic function.   3. Spectral Doppler shows impaired relaxation pattern of left   ventricular myocardial filling (Grade I diastolic dysfunction).    < end of copied text >

## 2022-02-18 NOTE — PROGRESS NOTE ADULT - ASSESSMENT
· Assessment	  87 y/o F w/ PMH/o HTN (as per son at bedside) presenting to the hospital [facility] after experiencing respiratory distress. According to the patients family, the patient was being fed when she began choking and had difficulty breathing. Pt was bag masked by EMS and transported to the hospital. According to the family, the patient began to experience diffuse bullae across her entire body as well as ulcerations in her mouth for the past month. The family denied noticing fever or chills.  In the ED, patient was tachypneic and hypoxic,  intubated for airway protection. Pt was found to hypothermic, started on warming blanket. Pt started on IV levophed. S/p IV vanc/ cefepime.    IMPRESSION;  Diffuse bullae : s/p Bx 2/9 > bullous pemphigoid  MSOF  JILL > resolved  Aspiration with chemical pneumonitis   2/9 Sputum > P aeruginosa > colonizer ?  No bacterial PNA on CXR  2/8 BCx NG  2/9 UCx NG    COVID-19 NG 1/9  COVID-19 positive 1/15  Ddimers 3908  Ferritin 521  Procal 1.14    CXR no new GGO  Pt is in the early viral replicative phase based on the timeline/onset of symptoms.   Given that she is on a vent see no indication for RDV  As there has been no change in fio2 will not recommend steroids    RECOMMENDATIONS;  CT angio : r/o PE  Zosyn 2.250 gm iv q6h  Off loading to prevent pressure sores and preventive measures to avoid aspiration   If any questions , please call [phone] or send a message on [facility] teams  Please update ID in real time with any pertinent new laboratory /microbiological/radiographically findings or a change in the clinical characteristics

## 2022-02-18 NOTE — PROGRESS NOTE ADULT - ASSESSMENT
IMPRESSION:  Acute Hypoxic Respiratory Failure   Possible aspiration  COVID 19 infection   JILL resolved   HO bullous pemphigoid     PLAN:    CNS: SAT.  Adequate sedation     HEENT: Oral care    PULMONARY:  HOB at 45 degrees.  SBT.  NO vent changes.  Will likely need tracheostomy       CARDIOVASCULAR:  Avoid overload.      GI: GI prophylaxis.  Bowel regimen.  O(G Feeding     RENAL:  FU Lytes.  Correct as needed.  DC free H2O.      INFECTIOUS DISEASE:  ABX per ID.  Trend Procal.  Follow up cultures.  follow burn     HEMATOLOGICAL:  DVT prophylaxis. LE duplex     ENDOCRINE:  Follow up FS. Insulin protocol if needed.  Steroids per burn and Derm      MUSCULOSKELETAL;  Bed rest.      Prognosis poor    Palliative care    GO

## 2022-02-18 NOTE — PROGRESS NOTE ADULT - SUBJECTIVE AND OBJECTIVE BOX
Patient is a 88y old  Female who presents with a chief complaint of hypoxia (18 Feb 2022 09:47)        Over Night Events:  Remains critically ill on MV>  Sedated.  Off pressors.         ROS:     All ROS are negative except HPI         PHYSICAL EXAM    ICU Vital Signs Last 24 Hrs  T(C): 35.9 (18 Feb 2022 08:00), Max: 36.6 (17 Feb 2022 19:00)  T(F): 96.7 (18 Feb 2022 08:00), Max: 97.8 (17 Feb 2022 19:00)  HR: 74 (18 Feb 2022 13:00) (62 - 83)  BP: 179/77 (18 Feb 2022 13:00) (103/51 - 190/82)  BP(mean): 110 (18 Feb 2022 13:00) (73 - 118)  ABP: --  ABP(mean): --  RR: 17 (18 Feb 2022 13:00) (9 - 21)  SpO2: 100% (18 Feb 2022 13:00) (98% - 100%)      CONSTITUTIONAL:  Ill appearing in NAD    ENT:   Airway patent,   Mouth with normal mucosa.   No thrush    EYES:   Pupils equal,   Round and reactive to light.    CARDIAC:   Normal rate,   Regular rhythm.     edema      Vascular:  Normal systolic impulse  No Carotid bruits    RESPIRATORY:   No wheezing  Bilateral BS  Normal chest expansion  Not tachypneic,  No use of accessory muscles    GASTROINTESTINAL:  Abdomen soft,   Non-tender,   No guarding,   + BS    MUSCULOSKELETAL:   Range of motion is not limited,  No clubbing, cyanosis    NEUROLOGICAL:   Sedated     SKIN:   Skin normal color for race,     PSYCHIATRIC:   Sedated   No apparent risk to self or others.    HEMATOLOGICAL:  No cervical  lymphadenopathy.  no inguinal lymphadenopathy      02-17-22 @ 07:01  -  02-18-22 @ 07:00  --------------------------------------------------------  IN:    FentaNYL: 360.8 mL    Free Water: 1000 mL    Glucerna 1.5: 595 mL    IV PiggyBack: 400 mL  Total IN: 2355.8 mL    OUT:    Indwelling Catheter - Urethral (mL): 120 mL    Voided (mL): 650 mL  Total OUT: 770 mL    Total NET: 1585.8 mL      02-18-22 @ 07:01  -  02-18-22 @ 13:21  --------------------------------------------------------  IN:    FentaNYL: 115.2 mL    Glucerna 1.5: 10 mL    IV PiggyBack: 50 mL  Total IN: 175.2 mL    OUT:  Total OUT: 0 mL    Total NET: 175.2 mL          LABS:                            8.2    14.61 )-----------( 228      ( 18 Feb 2022 07:30 )             25.1                                               02-18    137  |  100  |  31<H>  ----------------------------<  74  4.2   |  25  |  0.5<L>    Ca    7.1<L>      18 Feb 2022 07:30  Phos  3.7     02-18  Mg     1.9     02-18    TPro  4.3<L>  /  Alb  1.9<L>  /  TBili  1.3<H>  /  DBili  x   /  AST  91<H>  /  ALT  135<H>  /  AlkPhos  125<H>  02-18                                                                                           LIVER FUNCTIONS - ( 18 Feb 2022 07:30 )  Alb: 1.9 g/dL / Pro: 4.3 g/dL / ALK PHOS: 125 U/L / ALT: 135 U/L / AST: 91 U/L / GGT: x                                                                                               Mode: AC/ CMV (Assist Control/ Continuous Mandatory Ventilation)  RR (machine): 12  TV (machine): 300  FiO2: 30  PEEP: 5  ITime: 0.1  MAP: 8  PIP: 19                                      ABG - ( 17 Feb 2022 08:56 )  pH, Arterial: 7.45  pH, Blood: x     /  pCO2: 38    /  pO2: 148   / HCO3: 26    / Base Excess: 2.3   /  SaO2: 100.0               MEDICATIONS  (STANDING):  calcium gluconate IVPB 1 Gram(s) IV Intermittent <User Schedule>  chlorhexidine 0.12% Liquid 15 milliLiter(s) Oral Mucosa every 12 hours  chlorhexidine 4% Liquid 1 Application(s) Topical <User Schedule>  collagenase Ointment 1 Application(s) Topical two times a day  dextrose 40% Gel 15 Gram(s) Oral once  dextrose 5%. 1000 milliLiter(s) (50 mL/Hr) IV Continuous <Continuous>  dextrose 5%. 1000 milliLiter(s) (100 mL/Hr) IV Continuous <Continuous>  dextrose 50% Injectable 25 Gram(s) IV Push once  dextrose 50% Injectable 12.5 Gram(s) IV Push once  dextrose 50% Injectable 25 Gram(s) IV Push once  fentaNYL   Infusion. 0.587 MICROgram(s)/kG/Hr (2.25 mL/Hr) IV Continuous <Continuous>  glucagon  Injectable 1 milliGRAM(s) IntraMuscular once  heparin   Injectable 5000 Unit(s) SubCutaneous every 12 hours  influenza  Vaccine (HIGH DOSE) 0.7 milliLiter(s) IntraMuscular once  insulin lispro (ADMELOG) corrective regimen sliding scale   SubCutaneous every 6 hours  lactulose Syrup 10 Gram(s) Oral two times a day  norepinephrine Infusion 0.05 MICROgram(s)/kG/Min (4.22 mL/Hr) IV Continuous <Continuous>  pantoprazole  Injectable 40 milliGRAM(s) IV Push daily  piperacillin/tazobactam IVPB.. 2.25 Gram(s) IV Intermittent every 6 hours  polyethylene glycol 3350 17 Gram(s) Oral daily  predniSONE   Tablet 60 milliGRAM(s) Oral daily  vitamin A &amp; D Ointment 1 Application(s) Topical daily    MEDICATIONS  (PRN):  morphine  - Injectable 2 milliGRAM(s) IV Push two times a day PRN Severe Pain (7 - 10)      New X-rays reviewed:                                                                                  ECHO    CXR interpreted by me:  ET OG OK.  Bibasilar opacities

## 2022-02-18 NOTE — PROGRESS NOTE ADULT - ASSESSMENT
89 y/o F w/ PMH/o HTN (as per son at bedside) presenting to the hospital BIBA after experiencing respiratory distress. According to the patients family, the patient was being fed when she began choking and had difficulty breathing. Pt was bag masked by EMS and transported to the hospital. According to the family, the patient began to experience diffuse skin reaction across her entire body as well as ulcerations in her mouth for the past month. The family denied noticing fever or chills.    # Acute hypoxemic respiratory failure 2/2 aspiration  - Intubated on arrival  - Vent settings 12/300/5/30%   - DTA 2/9 negative  - Blood cx 2/8 negative     #Elevated D dimer  -F/U CTA and duplex  - D Dimer 2/16: 3,908    # Episode of torsades on 2/14  - QTc 2/14: 733  - Keep K >4, Mg>2  - Give calcium gluconate 1g q8 as per nephro  - Kphos 02/16  - Cardio following  - TTE 2/14: EF 60-65%    # Hypernatremia, resolved  # JILL  - Na >180 on presentation  - BUN/Cr 116/3.3 on presentation (baseline Cr 0.5)   - Trend BMP  - Goal decrease by 8-10 per 24hrs  - S/p Calcium gluconate as per nephro  - Na WNL now    # 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  - S/p Unasyn (2/9-2/15)  - Biopsy 2/9: bullous pemphigoid  - C/w prednisone (start 2/11)  - S/p doxycycline (2/12-2/15)  - Derm on board  - C/w zosyn as per ID (start 2/15)    # COVID-19  - COVID + on 2/15  - On prednisone 60 daily  - Ferritin 521, procal 1.14, ,   - ID following    DVT ppx: HSQ  Right radial A line 2/9 -> dc'ed 2/12  Left fem A line 2/13 -> dc'ed 2/17  R IJ TLC 2/14    TO FOLLOW UP:  #Electrolytes, keep Mag >2 and K >4 to prevent torsades  #Follow up with Derm for steroids  #Follow up with ID for abx  #Follow up with Pulm for extubation  #Keep R IJ in patient if possible, blood draws are very difficult due to patient's skin ulcerations  #F/U CTA and LE duplex

## 2022-02-18 NOTE — PROGRESS NOTE ADULT - SUBJECTIVE AND OBJECTIVE BOX
ARI MA  88y, Female    All available historical data reviewed    OVERNIGHT EVENTS:  no fevers  fio2 30%  no BM  off pressors      ROS:  unable to obtain history secondary to patient's mental status and/or sedation     VITALS:  T(F): 96.7, Max: 97.8 (02-17-22 @ 19:00)  HR: 74  BP: 179/77  RR: 17Vital Signs Last 24 Hrs  T(C): 35.9 (18 Feb 2022 08:00), Max: 36.6 (17 Feb 2022 19:00)  T(F): 96.7 (18 Feb 2022 08:00), Max: 97.8 (17 Feb 2022 19:00)  HR: 74 (18 Feb 2022 13:00) (62 - 83)  BP: 179/77 (18 Feb 2022 13:00) (103/51 - 190/82)  BP(mean): 110 (18 Feb 2022 13:00) (73 - 118)  RR: 17 (18 Feb 2022 13:00) (9 - 21)  SpO2: 100% (18 Feb 2022 13:00) (98% - 100%)    TESTS & MEASUREMENTS:                        8.2    14.61 )-----------( 228      ( 18 Feb 2022 07:30 )             25.1     02-18    137  |  100  |  31<H>  ----------------------------<  74  4.2   |  25  |  0.5<L>    Ca    7.1<L>      18 Feb 2022 07:30  Phos  3.7     02-18  Mg     1.9     02-18    TPro  4.3<L>  /  Alb  1.9<L>  /  TBili  1.3<H>  /  DBili  x   /  AST  91<H>  /  ALT  135<H>  /  AlkPhos  125<H>  02-18    LIVER FUNCTIONS - ( 18 Feb 2022 07:30 )  Alb: 1.9 g/dL / Pro: 4.3 g/dL / ALK PHOS: 125 U/L / ALT: 135 U/L / AST: 91 U/L / GGT: x                   RADIOLOGY & ADDITIONAL TESTS:  Personal review of radiological diagnostics performed  Echo and EKG results noted when applicable.     MEDICATIONS:  calcium gluconate IVPB 1 Gram(s) IV Intermittent <User Schedule>  chlorhexidine 0.12% Liquid 15 milliLiter(s) Oral Mucosa every 12 hours  chlorhexidine 4% Liquid 1 Application(s) Topical <User Schedule>  collagenase Ointment 1 Application(s) Topical two times a day  dextrose 40% Gel 15 Gram(s) Oral once  dextrose 5%. 1000 milliLiter(s) IV Continuous <Continuous>  dextrose 5%. 1000 milliLiter(s) IV Continuous <Continuous>  dextrose 50% Injectable 25 Gram(s) IV Push once  dextrose 50% Injectable 12.5 Gram(s) IV Push once  dextrose 50% Injectable 25 Gram(s) IV Push once  fentaNYL   Infusion. 0.587 MICROgram(s)/kG/Hr IV Continuous <Continuous>  glucagon  Injectable 1 milliGRAM(s) IntraMuscular once  heparin   Injectable 5000 Unit(s) SubCutaneous every 12 hours  influenza  Vaccine (HIGH DOSE) 0.7 milliLiter(s) IntraMuscular once  insulin lispro (ADMELOG) corrective regimen sliding scale   SubCutaneous every 6 hours  lactulose Syrup 10 Gram(s) Oral two times a day  morphine  - Injectable 2 milliGRAM(s) IV Push two times a day PRN  norepinephrine Infusion 0.05 MICROgram(s)/kG/Min IV Continuous <Continuous>  pantoprazole  Injectable 40 milliGRAM(s) IV Push daily  piperacillin/tazobactam IVPB.. 2.25 Gram(s) IV Intermittent every 6 hours  polyethylene glycol 3350 17 Gram(s) Oral daily  predniSONE   Tablet 60 milliGRAM(s) Oral daily  vitamin A &amp; D Ointment 1 Application(s) Topical daily      ANTIBIOTICS:  piperacillin/tazobactam IVPB.. 2.25 Gram(s) IV Intermittent every 6 hours

## 2022-02-19 LAB
ALBUMIN SERPL ELPH-MCNC: 1.8 G/DL — LOW (ref 3.5–5.2)
ALP SERPL-CCNC: 102 U/L — SIGNIFICANT CHANGE UP (ref 30–115)
ALT FLD-CCNC: 130 U/L — HIGH (ref 0–41)
ANION GAP SERPL CALC-SCNC: 9 MMOL/L — SIGNIFICANT CHANGE UP (ref 7–14)
AST SERPL-CCNC: 72 U/L — HIGH (ref 0–41)
BASE EXCESS BLDV CALC-SCNC: -0.4 MMOL/L — SIGNIFICANT CHANGE UP (ref -2–3)
BASOPHILS # BLD AUTO: 0.01 K/UL — SIGNIFICANT CHANGE UP (ref 0–0.2)
BASOPHILS NFR BLD AUTO: 0.1 % — SIGNIFICANT CHANGE UP (ref 0–1)
BILIRUB SERPL-MCNC: 1.3 MG/DL — HIGH (ref 0.2–1.2)
BUN SERPL-MCNC: 25 MG/DL — HIGH (ref 10–20)
CA-I SERPL-SCNC: 1.08 MMOL/L — LOW (ref 1.15–1.33)
CALCIUM SERPL-MCNC: 7.4 MG/DL — LOW (ref 8.5–10.1)
CHLORIDE SERPL-SCNC: 100 MMOL/L — SIGNIFICANT CHANGE UP (ref 98–110)
CO2 SERPL-SCNC: 25 MMOL/L — SIGNIFICANT CHANGE UP (ref 17–32)
CREAT SERPL-MCNC: <0.5 MG/DL — LOW (ref 0.7–1.5)
EOSINOPHIL # BLD AUTO: 0.01 K/UL — SIGNIFICANT CHANGE UP (ref 0–0.7)
EOSINOPHIL NFR BLD AUTO: 0.1 % — SIGNIFICANT CHANGE UP (ref 0–8)
GAS PNL BLDV: 129 MMOL/L — LOW (ref 136–145)
GAS PNL BLDV: SIGNIFICANT CHANGE UP
GAS PNL BLDV: SIGNIFICANT CHANGE UP
GLUCOSE BLDC GLUCOMTR-MCNC: 103 MG/DL — HIGH (ref 70–99)
GLUCOSE BLDC GLUCOMTR-MCNC: 114 MG/DL — HIGH (ref 70–99)
GLUCOSE BLDC GLUCOMTR-MCNC: 70 MG/DL — SIGNIFICANT CHANGE UP (ref 70–99)
GLUCOSE SERPL-MCNC: 78 MG/DL — SIGNIFICANT CHANGE UP (ref 70–99)
HCO3 BLDV-SCNC: 25 MMOL/L — SIGNIFICANT CHANGE UP (ref 22–29)
HCT VFR BLD CALC: 23.8 % — LOW (ref 37–47)
HCT VFR BLDA CALC: 31 % — LOW (ref 39–51)
HGB BLD CALC-MCNC: 10.4 G/DL — LOW (ref 12.6–17.4)
HGB BLD-MCNC: 7.6 G/DL — LOW (ref 12–16)
HOROWITZ INDEX BLDV+IHG-RTO: 30 — SIGNIFICANT CHANGE UP
IMM GRANULOCYTES NFR BLD AUTO: 1.9 % — HIGH (ref 0.1–0.3)
LACTATE BLDV-MCNC: 1.6 MMOL/L — SIGNIFICANT CHANGE UP (ref 0.5–2)
LYMPHOCYTES # BLD AUTO: 0.53 K/UL — LOW (ref 1.2–3.4)
LYMPHOCYTES # BLD AUTO: 4.4 % — LOW (ref 20.5–51.1)
MAGNESIUM SERPL-MCNC: 2.3 MG/DL — SIGNIFICANT CHANGE UP (ref 1.8–2.4)
MCHC RBC-ENTMCNC: 29.1 PG — SIGNIFICANT CHANGE UP (ref 27–31)
MCHC RBC-ENTMCNC: 31.9 G/DL — LOW (ref 32–37)
MCV RBC AUTO: 91.2 FL — SIGNIFICANT CHANGE UP (ref 81–99)
MONOCYTES # BLD AUTO: 0.82 K/UL — HIGH (ref 0.1–0.6)
MONOCYTES NFR BLD AUTO: 6.8 % — SIGNIFICANT CHANGE UP (ref 1.7–9.3)
NEUTROPHILS # BLD AUTO: 10.53 K/UL — HIGH (ref 1.4–6.5)
NEUTROPHILS NFR BLD AUTO: 86.7 % — HIGH (ref 42.2–75.2)
NRBC # BLD: 0 /100 WBCS — SIGNIFICANT CHANGE UP (ref 0–0)
PCO2 BLDV: 42 MMHG — SIGNIFICANT CHANGE UP (ref 39–42)
PH BLDV: 7.38 — SIGNIFICANT CHANGE UP (ref 7.32–7.43)
PLATELET # BLD AUTO: 240 K/UL — SIGNIFICANT CHANGE UP (ref 130–400)
PO2 BLDV: 42 MMHG — SIGNIFICANT CHANGE UP
POTASSIUM BLDV-SCNC: 4.4 MMOL/L — SIGNIFICANT CHANGE UP (ref 3.5–5.1)
POTASSIUM SERPL-MCNC: 3.6 MMOL/L — SIGNIFICANT CHANGE UP (ref 3.5–5)
POTASSIUM SERPL-SCNC: 3.6 MMOL/L — SIGNIFICANT CHANGE UP (ref 3.5–5)
PROT SERPL-MCNC: 4.1 G/DL — LOW (ref 6–8)
RBC # BLD: 2.61 M/UL — LOW (ref 4.2–5.4)
RBC # FLD: 15.6 % — HIGH (ref 11.5–14.5)
SAO2 % BLDV: 71.4 % — SIGNIFICANT CHANGE UP
SODIUM SERPL-SCNC: 134 MMOL/L — LOW (ref 135–146)
WBC # BLD: 12.13 K/UL — HIGH (ref 4.8–10.8)
WBC # FLD AUTO: 12.13 K/UL — HIGH (ref 4.8–10.8)

## 2022-02-19 PROCEDURE — 74018 RADEX ABDOMEN 1 VIEW: CPT | Mod: 26

## 2022-02-19 PROCEDURE — 71045 X-RAY EXAM CHEST 1 VIEW: CPT | Mod: 26,77

## 2022-02-19 PROCEDURE — 99233 SBSQ HOSP IP/OBS HIGH 50: CPT

## 2022-02-19 PROCEDURE — 93010 ELECTROCARDIOGRAM REPORT: CPT

## 2022-02-19 PROCEDURE — 71045 X-RAY EXAM CHEST 1 VIEW: CPT | Mod: 26

## 2022-02-19 RX ORDER — MORPHINE SULFATE 50 MG/1
2 CAPSULE, EXTENDED RELEASE ORAL THREE TIMES A DAY
Refills: 0 | Status: DISCONTINUED | OUTPATIENT
Start: 2022-02-19 | End: 2022-02-26

## 2022-02-19 RX ORDER — SODIUM CHLORIDE 9 MG/ML
1000 INJECTION, SOLUTION INTRAVENOUS
Refills: 0 | Status: DISCONTINUED | OUTPATIENT
Start: 2022-02-19 | End: 2022-02-20

## 2022-02-19 RX ORDER — LACTULOSE 10 G/15ML
20 SOLUTION ORAL
Refills: 0 | Status: DISCONTINUED | OUTPATIENT
Start: 2022-02-19 | End: 2022-03-01

## 2022-02-19 RX ORDER — POLYETHYLENE GLYCOL 3350 17 G/17G
17 POWDER, FOR SOLUTION ORAL EVERY 12 HOURS
Refills: 0 | Status: DISCONTINUED | OUTPATIENT
Start: 2022-02-19 | End: 2022-03-01

## 2022-02-19 RX ORDER — LACTULOSE 10 G/15ML
200 SOLUTION ORAL
Refills: 0 | Status: DISCONTINUED | OUTPATIENT
Start: 2022-02-19 | End: 2022-03-01

## 2022-02-19 RX ORDER — POTASSIUM CHLORIDE 20 MEQ
40 PACKET (EA) ORAL ONCE
Refills: 0 | Status: COMPLETED | OUTPATIENT
Start: 2022-02-19 | End: 2022-02-19

## 2022-02-19 RX ADMIN — MORPHINE SULFATE 2 MILLIGRAM(S): 50 CAPSULE, EXTENDED RELEASE ORAL at 02:34

## 2022-02-19 RX ADMIN — CHLORHEXIDINE GLUCONATE 1 APPLICATION(S): 213 SOLUTION TOPICAL at 05:02

## 2022-02-19 RX ADMIN — Medication 60 MILLIGRAM(S): at 05:02

## 2022-02-19 RX ADMIN — Medication 100 GRAM(S): at 02:45

## 2022-02-19 RX ADMIN — PANTOPRAZOLE SODIUM 40 MILLIGRAM(S): 20 TABLET, DELAYED RELEASE ORAL at 11:37

## 2022-02-19 RX ADMIN — MORPHINE SULFATE 2 MILLIGRAM(S): 50 CAPSULE, EXTENDED RELEASE ORAL at 21:38

## 2022-02-19 RX ADMIN — Medication 100 GRAM(S): at 21:36

## 2022-02-19 RX ADMIN — HEPARIN SODIUM 5000 UNIT(S): 5000 INJECTION INTRAVENOUS; SUBCUTANEOUS at 19:58

## 2022-02-19 RX ADMIN — LACTULOSE 20 GRAM(S): 10 SOLUTION ORAL at 19:59

## 2022-02-19 RX ADMIN — CHLORHEXIDINE GLUCONATE 15 MILLILITER(S): 213 SOLUTION TOPICAL at 18:31

## 2022-02-19 RX ADMIN — PIPERACILLIN AND TAZOBACTAM 200 GRAM(S): 4; .5 INJECTION, POWDER, LYOPHILIZED, FOR SOLUTION INTRAVENOUS at 19:58

## 2022-02-19 RX ADMIN — Medication 1 APPLICATION(S): at 11:00

## 2022-02-19 RX ADMIN — SODIUM CHLORIDE 50 MILLILITER(S): 9 INJECTION, SOLUTION INTRAVENOUS at 02:17

## 2022-02-19 RX ADMIN — MORPHINE SULFATE 2 MILLIGRAM(S): 50 CAPSULE, EXTENDED RELEASE ORAL at 22:43

## 2022-02-19 RX ADMIN — PIPERACILLIN AND TAZOBACTAM 200 GRAM(S): 4; .5 INJECTION, POWDER, LYOPHILIZED, FOR SOLUTION INTRAVENOUS at 05:02

## 2022-02-19 RX ADMIN — Medication 1 APPLICATION(S): at 05:03

## 2022-02-19 RX ADMIN — MORPHINE SULFATE 2 MILLIGRAM(S): 50 CAPSULE, EXTENDED RELEASE ORAL at 02:49

## 2022-02-19 RX ADMIN — LACTULOSE 10 GRAM(S): 10 SOLUTION ORAL at 05:01

## 2022-02-19 RX ADMIN — POLYETHYLENE GLYCOL 3350 17 GRAM(S): 17 POWDER, FOR SOLUTION ORAL at 19:59

## 2022-02-19 RX ADMIN — POLYETHYLENE GLYCOL 3350 17 GRAM(S): 17 POWDER, FOR SOLUTION ORAL at 11:36

## 2022-02-19 RX ADMIN — Medication 40 MILLIEQUIVALENT(S): at 10:22

## 2022-02-19 RX ADMIN — HEPARIN SODIUM 5000 UNIT(S): 5000 INJECTION INTRAVENOUS; SUBCUTANEOUS at 05:01

## 2022-02-19 RX ADMIN — Medication 1 APPLICATION(S): at 18:31

## 2022-02-19 RX ADMIN — PIPERACILLIN AND TAZOBACTAM 200 GRAM(S): 4; .5 INJECTION, POWDER, LYOPHILIZED, FOR SOLUTION INTRAVENOUS at 11:36

## 2022-02-19 RX ADMIN — MORPHINE SULFATE 2 MILLIGRAM(S): 50 CAPSULE, EXTENDED RELEASE ORAL at 10:22

## 2022-02-19 RX ADMIN — FENTANYL CITRATE 2.25 MICROGRAM(S)/KG/HR: 50 INJECTION INTRAVENOUS at 13:42

## 2022-02-19 RX ADMIN — CHLORHEXIDINE GLUCONATE 15 MILLILITER(S): 213 SOLUTION TOPICAL at 05:02

## 2022-02-19 NOTE — PROGRESS NOTE ADULT - TIME BILLING
#Acute hypoxic resp failure; presumed due to aspiration  s/p intubation 2/9  cxr with bl opacities  cta chest no pe; c/w aspiration  sputum cx with pseudomonas  va duplex neg  zosyn  SAT, SBT  vent per icu  #Torsades de pointes  noted on 2/14, no further episodes  in setting of prolong qtc  tte with preserved ef; grade I diastolic dysfunction  qtc now 488  trend ekg  mg >2  monitor on tele  #Bullous pemphigoid  s/p skin bx 2/9  prednisone 60  f/u derm  #Covid  appears incidental  prednisone as above  cta as above    #Progress Note Handoff:  Pending (specify):  Consults_________, Tests________, Test Results_______, Other____intubated_____  Family discussion:  Disposition: Home___/SNF___/Other________/Unknown at this time____x____ #Acute hypoxic resp failure; presumed due to aspiration  s/p intubation 2/9  cxr with bl opacities  cta chest no pe; c/w aspiration  sputum cx with pseudomonas  va duplex neg  zosyn  SAT, SBT  vent per icu  #Torsades de pointes  noted on 2/14, no further episodes  in setting of prolong qtc  tte with preserved ef; grade I diastolic dysfunction  qtc now 488  trend ekg  mg >2  monitor on tele  #Bullous pemphigoid  s/p skin bx 2/9  prednisone 60  f/u derm  #Covid  appears incidental  prednisone as above  cta as above  #Transaminitis, hepatocellular predom  check ruq us  trend    #Progress Note Handoff:  Pending (specify):  Consults_________, Tests________, Test Results_______, Other____intubated_____  Family discussion:  Disposition: Home___/SNF___/Other________/Unknown at this time____x____

## 2022-02-19 NOTE — PROGRESS NOTE ADULT - SUBJECTIVE AND OBJECTIVE BOX
Patient is a 88y old  Female who presents with a chief complaint of hypoxia (19 Feb 2022 09:16)        Over Night Events:  remains critically ill on MV>  Sedated.  Off pressors         ROS:     All ROS are negative except HPI         PHYSICAL EXAM    ICU Vital Signs Last 24 Hrs  T(C): 36.1 (19 Feb 2022 04:00), Max: 36.3 (18 Feb 2022 16:00)  T(F): 97 (19 Feb 2022 04:00), Max: 97.3 (18 Feb 2022 16:00)  HR: 56 (19 Feb 2022 09:00) (55 - 80)  BP: 125/59 (19 Feb 2022 09:00) (103/55 - 227/93)  BP(mean): 85 (19 Feb 2022 09:00) (76 - 134)  ABP: --  ABP(mean): --  RR: 17 (19 Feb 2022 09:00) (9 - 23)  SpO2: 100% (19 Feb 2022 09:00) (95% - 100%)      CONSTITUTIONAL:  Ill appearing in NAD    ENT:   Airway patent,   Mouth with normal mucosa.   No thrush    EYES:   Pupils equal,   Round and reactive to light.    CARDIAC:   Normal rate,   Regular rhythm.     edema      Vascular:  Normal systolic impulse  No Carotid bruits    RESPIRATORY:   No wheezing  Bilateral BS  Normal chest expansion  Not tachypneic,  No use of accessory muscles    GASTROINTESTINAL:  Abdomen soft,   Non-tender,   No guarding,   + BS    MUSCULOSKELETAL:   Range of motion is not limited,  No clubbing, cyanosis    NEUROLOGICAL:   Sedated     SKIN:   Skin normal color for race,     PSYCHIATRIC:   Sedated   No apparent risk to self or others.    HEMATOLOGICAL:  No cervical  lymphadenopathy.  no inguinal lymphadenopathy      02-18-22 @ 07:01  -  02-19-22 @ 07:00  --------------------------------------------------------  IN:    Enteral Tube Flush: 60 mL    FentaNYL: 443.3 mL    Glucerna 1.5: 10 mL    IV PiggyBack: 300 mL    Lactated Ringers: 250 mL  Total IN: 1063.3 mL    OUT:    Indwelling Catheter - Urethral (mL): 550 mL    Rectal Tube (mL): 50 mL    Voided (mL): 550 mL  Total OUT: 1150 mL    Total NET: -86.7 mL          LABS:                            7.6    12.13 )-----------( 240      ( 19 Feb 2022 05:34 )             23.8                                               02-19    134<L>  |  100  |  25<H>  ----------------------------<  78  3.6   |  25  |  <0.5<L>    Ca    7.4<L>      19 Feb 2022 05:34  Phos  3.7     02-18  Mg     2.3     02-19    TPro  4.1<L>  /  Alb  1.8<L>  /  TBili  1.3<H>  /  DBili  x   /  AST  72<H>  /  ALT  130<H>  /  AlkPhos  102  02-19                                                                                           LIVER FUNCTIONS - ( 19 Feb 2022 05:34 )  Alb: 1.8 g/dL / Pro: 4.1 g/dL / ALK PHOS: 102 U/L / ALT: 130 U/L / AST: 72 U/L / GGT: x                                                                                               Mode: AC/ CMV (Assist Control/ Continuous Mandatory Ventilation)  RR (machine): 12  TV (machine): 300  FiO2: 30  PEEP: 5  ITime: 1  MAP: 9  PIP: 29                                          MEDICATIONS  (STANDING):  calcium gluconate IVPB 1 Gram(s) IV Intermittent <User Schedule>  chlorhexidine 0.12% Liquid 15 milliLiter(s) Oral Mucosa every 12 hours  chlorhexidine 4% Liquid 1 Application(s) Topical <User Schedule>  collagenase Ointment 1 Application(s) Topical two times a day  dextrose 40% Gel 15 Gram(s) Oral once  dextrose 5%. 1000 milliLiter(s) (50 mL/Hr) IV Continuous <Continuous>  dextrose 5%. 1000 milliLiter(s) (100 mL/Hr) IV Continuous <Continuous>  dextrose 50% Injectable 25 Gram(s) IV Push once  dextrose 50% Injectable 12.5 Gram(s) IV Push once  dextrose 50% Injectable 25 Gram(s) IV Push once  fentaNYL   Infusion. 0.587 MICROgram(s)/kG/Hr (2.25 mL/Hr) IV Continuous <Continuous>  glucagon  Injectable 1 milliGRAM(s) IntraMuscular once  heparin   Injectable 5000 Unit(s) SubCutaneous every 12 hours  influenza  Vaccine (HIGH DOSE) 0.7 milliLiter(s) IntraMuscular once  insulin lispro (ADMELOG) corrective regimen sliding scale   SubCutaneous every 6 hours  lactated ringers. 1000 milliLiter(s) (50 mL/Hr) IV Continuous <Continuous>  lactulose Syrup 10 Gram(s) Oral two times a day  norepinephrine Infusion 0.05 MICROgram(s)/kG/Min (4.22 mL/Hr) IV Continuous <Continuous>  pantoprazole  Injectable 40 milliGRAM(s) IV Push daily  piperacillin/tazobactam IVPB.. 2.25 Gram(s) IV Intermittent every 6 hours  polyethylene glycol 3350 17 Gram(s) Oral daily  potassium chloride   Powder 40 milliEquivalent(s) Oral once  predniSONE   Tablet 60 milliGRAM(s) Oral daily  vitamin A &amp; D Ointment 1 Application(s) Topical daily    MEDICATIONS  (PRN):  morphine  - Injectable 2 milliGRAM(s) IV Push two times a day PRN Severe Pain (7 - 10)      New X-rays reviewed:                                                                                  ECHO    CXR interpreted by me:  ET OG OK>  No infiltrate

## 2022-02-19 NOTE — PROGRESS NOTE ADULT - ASSESSMENT
IMPRESSION:  Acute Hypoxic Respiratory Failure   Possible aspiration  COVID 19 infection   JILL resolved   HO bullous pemphigoid     PLAN:    CNS: SAT.  Adequate sedation     HEENT: Oral care    PULMONARY:  HOB at 45 degrees.  SBT.  NO vent changes.  Will likely need tracheostomy       CARDIOVASCULAR:  Avoid overload.      GI: GI prophylaxis.  Bowel regimen.  OG Feeding.  KUB.       RENAL:  FU Lytes.  Correct as needed.      INFECTIOUS DISEASE:  ABX per ID.  Trend Procal.  Follow up cultures.    HEMATOLOGICAL:  DVT prophylaxis. LE duplex and CTA negative     ENDOCRINE:  Follow up FS. Insulin protocol if needed.  Steroids per burn and Derm      MUSCULOSKELETAL;  Bed rest.      Prognosis poor    Palliative care    GO

## 2022-02-19 NOTE — PROGRESS NOTE ADULT - SUBJECTIVE AND OBJECTIVE BOX
ARI MA, 88y, Female; MRN# [id number]  Hospital Stay: 10d.    Patient is a 88y old Female who presents with a chief complaint of respiratory failure (11 Feb 2022 22:40)  Currently admitted to the Burn ICU with the primary diagnosis of Acute respiratory failure with hypoxia      89 y/o F w/ PMH/o HTN (as per son at bedside) presented to the hospital [facility] after experiencing respiratory distress. According to the patients family, the patient was being fed when she began choking and had difficulty breathing. Pt was bag masked by EMS and transported to the hospital. According to the family, the patient began to experience diffuse skin reaction across her entire body as well as ulcerations in her mouth for the past month. The family denied noticing fever or chills.    In the ED, patient was tachypneic and hypoxic,  intubated for airway protection. Pt was found to hypothermic, started on warming blanket. Pt started on IV levophed. S/p IV vanc/ cefepime in the ED. Derm and Burn were called to evaluate the patient's skin condition. The patient's sodium was noted to be 180. An A-line was also placed to monitor the patient's pressures.     While in the CCU the patient slowly had her sodium corrected via fluids. She was initially started on D5 and slowly transitioned to D51/2NS. The patient's skin lesions were also biopsied and showed bullous pemphigoid. She was started on steroids and a short course of doxycycline. Per ID the patient was to be switched to Zosyn. The patient's sugars became extremely high due to the combination of D5 and steroids so the patient was also started on an insulin drip to control the sugars. The patient's sugars eventually came under control and the insulin drip was stopped. She was transitioned to subq insulin. On 2/14 the patient went into an episode of Torsades. Her electrolytes were noted to be low and her QTc was 733. The patient was started on calcium gluconate 1g q8 and her electrolytes were corrected. She has not had an episode of torsades since. The patient has remained intubated since arrival with a slow weaning of sedation. She only remains on Fentanyl. Patient tested positive for COVID on 2/15. She is already on steroids and intubated. Patient on and off low dose Levo for pressure support.    Patient's family adamant that patient should remain full code.       SUBJECTIVE:  Interval/Overnight events: No overnight events. Pt on 12/300/5/30%.     Will do SAT SBT today 2/19    REVIEW OF SYSTEMS:  As per HPI  OBJECTIVE:  VITALS:  ICU Vital Signs Last 24 Hrs  T(C): 36.1 (19 Feb 2022 04:00), Max: 36.3 (18 Feb 2022 16:00)  T(F): 97 (19 Feb 2022 04:00), Max: 97.3 (18 Feb 2022 16:00)  HR: 56 (19 Feb 2022 09:00) (55 - 80)  BP: 125/59 (19 Feb 2022 09:00) (103/55 - 227/93)  BP(mean): 85 (19 Feb 2022 09:00) (76 - 134)  ABP: --  ABP(mean): --  RR: 17 (19 Feb 2022 09:00) (9 - 23)  SpO2: 100% (19 Feb 2022 09:00) (95% - 100%)      LABS:  cret                        7.6    12.13 )-----------( 240      ( 19 Feb 2022 05:34 )             23.8     02-19    134<L>  |  100  |  25<H>  ----------------------------<  78  3.6   |  25  |  <0.5<L>    Ca    7.4<L>      19 Feb 2022 05:34  Phos  3.7     02-18  Mg     2.3     02-19    TPro  4.1<L>  /  Alb  1.8<L>  /  TBili  1.3<H>  /  DBili  x   /  AST  72<H>  /  ALT  130<H>  /  AlkPhos  102  02-19        MEDICATIONS  (STANDING):  calcium gluconate IVPB 1 Gram(s) IV Intermittent <User Schedule>  chlorhexidine 0.12% Liquid 15 milliLiter(s) Oral Mucosa every 12 hours  chlorhexidine 4% Liquid 1 Application(s) Topical <User Schedule>  collagenase Ointment 1 Application(s) Topical two times a day  dextrose 40% Gel 15 Gram(s) Oral once  dextrose 5%. 1000 milliLiter(s) (50 mL/Hr) IV Continuous <Continuous>  dextrose 5%. 1000 milliLiter(s) (100 mL/Hr) IV Continuous <Continuous>  dextrose 50% Injectable 25 Gram(s) IV Push once  dextrose 50% Injectable 12.5 Gram(s) IV Push once  dextrose 50% Injectable 25 Gram(s) IV Push once  fentaNYL   Infusion. 0.587 MICROgram(s)/kG/Hr (2.25 mL/Hr) IV Continuous <Continuous>  glucagon  Injectable 1 milliGRAM(s) IntraMuscular once  heparin   Injectable 5000 Unit(s) SubCutaneous every 12 hours  influenza  Vaccine (HIGH DOSE) 0.7 milliLiter(s) IntraMuscular once  insulin lispro (ADMELOG) corrective regimen sliding scale   SubCutaneous every 6 hours  lactated ringers. 1000 milliLiter(s) (50 mL/Hr) IV Continuous <Continuous>  lactulose Syrup 10 Gram(s) Oral two times a day  norepinephrine Infusion 0.05 MICROgram(s)/kG/Min (4.22 mL/Hr) IV Continuous <Continuous>  pantoprazole  Injectable 40 milliGRAM(s) IV Push daily  piperacillin/tazobactam IVPB.. 2.25 Gram(s) IV Intermittent every 6 hours  polyethylene glycol 3350 17 Gram(s) Oral daily  potassium chloride   Powder 40 milliEquivalent(s) Oral once  predniSONE   Tablet 60 milliGRAM(s) Oral daily  vitamin A &amp; D Ointment 1 Application(s) Topical daily    MEDICATIONS  (PRN):  morphine  - Injectable 2 milliGRAM(s) IV Push two times a day PRN Severe Pain (7 - 10)      PHYSICAL EXAM:  CONST: geriatric female  NECK:  supple, + R IJ TLC  CARDIAC:  regular rate and rhythm, normal S1 and S2  RESP:  respiratory rate and effort appear normal for age  ABDOMEN:  soft, nontender  MUSCULOSKELETAL/NEURO:  normal movement, normal tone  SKIN:  + diffuse bullae        IMAGING:  < from: Xray Chest 1 View-PORTABLE IMMEDIATE (Xray Chest 1 View-PORTABLE IMMEDIATE .) (02.16.22 @ 08:57) >  FINDINGS/  IMPRESSION:    Stable lines and tubes grossly in satisfactory position.    Unchanged bibasal opacities. No pneumothorax.    Stable cardiomediastinal silhouette.    Unchanged osseous structures.    < end of copied text >    ECHO:  < from: TTE Echo Complete w/o Contrast w/ Doppler (02.14.22 @ 11:31) >  Summary:   1. Left ventricular ejection fraction, by visual estimation, is 60 to   65%.   2. Normal global left ventricular systolic function.   3. Spectral Doppler shows impaired relaxation pattern of left   ventricular myocardial filling (Grade I diastolic dysfunction).    < end of copied text >

## 2022-02-19 NOTE — PROGRESS NOTE ADULT - ASSESSMENT
87 y/o F w/ PMH/o HTN (as per son at bedside) presenting to the hospital [facility] after experiencing respiratory distress. According to the patients family, the patient was being fed when she began choking and had difficulty breathing. Pt was bag masked by EMS and transported to the hospital. According to the family, the patient began to experience diffuse skin reaction across her entire body as well as ulcerations in her mouth for the past month. The family denied noticing fever or chills.    # Acute hypoxemic respiratory failure 2/2 aspiration  - Intubated on arrival  - Vent settings 12/300/5/30%   - DTA 2/9 negative  - Blood cx 2/8 negative     #Elevated D dimer  - CTA and duplex both negative 2/18  - D Dimer 2/16: 3,908    # Episode of torsades on 2/14  - QTc 2/14: 733  - Keep K >4, Mg>2  - Give calcium gluconate 1g q8 as per nephro  - Kphos 02/16  - Cardio following  - TTE 2/14: EF 60-65%    # Hypernatremia, resolved  # JILL  - Na >180 on presentation  - BUN/Cr 116/3.3 on presentation (baseline Cr 0.5)   - Trend BMP  - Goal decrease by 8-10 per 24hrs  - S/p Calcium gluconate as per nephro  - Na WNL now    # 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  - S/p Unasyn (2/9-2/15)  - Biopsy 2/9: bullous pemphigoid  - C/w prednisone (start 2/11)  - S/p doxycycline (2/12-2/15)  - Derm on board  - C/w zosyn as per ID (start 2/15)    # COVID-19  - COVID + on 2/15  - On prednisone 60 daily  - Ferritin 521, procal 1.14, ,   - ID following    DVT ppx: HSQ  Right radial A line 2/9 -> dc'ed 2/12  Left fem A line 2/13 -> dc'ed 2/17  R IJ TLC 2/14    TO FOLLOW UP:  #Electrolytes, keep Mag >2 and K >4 to prevent torsades  #Follow up with Derm for steroids  #Follow up with ID for abx  #Follow up with Pulm for extubation  #Keep R IJ in patient if possible, blood draws are very difficult due to patient's skin ulcerations

## 2022-02-20 LAB
ALBUMIN SERPL ELPH-MCNC: 1.8 G/DL — LOW (ref 3.5–5.2)
ALP SERPL-CCNC: 106 U/L — SIGNIFICANT CHANGE UP (ref 30–115)
ALT FLD-CCNC: 130 U/L — HIGH (ref 0–41)
ANION GAP SERPL CALC-SCNC: 11 MMOL/L — SIGNIFICANT CHANGE UP (ref 7–14)
AST SERPL-CCNC: 64 U/L — HIGH (ref 0–41)
BASE EXCESS BLDV CALC-SCNC: 0.4 MMOL/L — SIGNIFICANT CHANGE UP (ref -2–3)
BASOPHILS # BLD AUTO: 0.02 K/UL — SIGNIFICANT CHANGE UP (ref 0–0.2)
BASOPHILS NFR BLD AUTO: 0.1 % — SIGNIFICANT CHANGE UP (ref 0–1)
BILIRUB SERPL-MCNC: 1.7 MG/DL — HIGH (ref 0.2–1.2)
BUN SERPL-MCNC: 20 MG/DL — SIGNIFICANT CHANGE UP (ref 10–20)
CA-I SERPL-SCNC: 1.13 MMOL/L — LOW (ref 1.15–1.33)
CALCIUM SERPL-MCNC: 7.9 MG/DL — LOW (ref 8.5–10.1)
CHLORIDE SERPL-SCNC: 96 MMOL/L — LOW (ref 98–110)
CK SERPL-CCNC: 480 U/L — HIGH (ref 0–225)
CO2 SERPL-SCNC: 24 MMOL/L — SIGNIFICANT CHANGE UP (ref 17–32)
CREAT SERPL-MCNC: <0.5 MG/DL — LOW (ref 0.7–1.5)
D DIMER BLD IA.RAPID-MCNC: 2123 NG/ML DDU — HIGH (ref 0–230)
EOSINOPHIL # BLD AUTO: 0.04 K/UL — SIGNIFICANT CHANGE UP (ref 0–0.7)
EOSINOPHIL NFR BLD AUTO: 0.3 % — SIGNIFICANT CHANGE UP (ref 0–8)
GAS PNL BLDV: 129 MMOL/L — LOW (ref 136–145)
GAS PNL BLDV: SIGNIFICANT CHANGE UP
GAS PNL BLDV: SIGNIFICANT CHANGE UP
GLUCOSE BLDC GLUCOMTR-MCNC: 102 MG/DL — HIGH (ref 70–99)
GLUCOSE BLDC GLUCOMTR-MCNC: 105 MG/DL — HIGH (ref 70–99)
GLUCOSE BLDC GLUCOMTR-MCNC: 109 MG/DL — HIGH (ref 70–99)
GLUCOSE BLDC GLUCOMTR-MCNC: 90 MG/DL — SIGNIFICANT CHANGE UP (ref 70–99)
GLUCOSE SERPL-MCNC: 94 MG/DL — SIGNIFICANT CHANGE UP (ref 70–99)
HCO3 BLDV-SCNC: 25 MMOL/L — SIGNIFICANT CHANGE UP (ref 22–29)
HCT VFR BLD CALC: 24.9 % — LOW (ref 37–47)
HCT VFR BLDA CALC: 26 % — LOW (ref 39–51)
HGB BLD CALC-MCNC: 8.5 G/DL — LOW (ref 12.6–17.4)
HGB BLD-MCNC: 8.2 G/DL — LOW (ref 12–16)
HOROWITZ INDEX BLDV+IHG-RTO: 30 — SIGNIFICANT CHANGE UP
IMM GRANULOCYTES NFR BLD AUTO: 1.6 % — HIGH (ref 0.1–0.3)
LACTATE BLDV-MCNC: 1.3 MMOL/L — SIGNIFICANT CHANGE UP (ref 0.5–2)
LYMPHOCYTES # BLD AUTO: 0.77 K/UL — LOW (ref 1.2–3.4)
LYMPHOCYTES # BLD AUTO: 5 % — LOW (ref 20.5–51.1)
MAGNESIUM SERPL-MCNC: 1.6 MG/DL — LOW (ref 1.8–2.4)
MCHC RBC-ENTMCNC: 29.6 PG — SIGNIFICANT CHANGE UP (ref 27–31)
MCHC RBC-ENTMCNC: 32.9 G/DL — SIGNIFICANT CHANGE UP (ref 32–37)
MCV RBC AUTO: 89.9 FL — SIGNIFICANT CHANGE UP (ref 81–99)
MONOCYTES # BLD AUTO: 1.02 K/UL — HIGH (ref 0.1–0.6)
MONOCYTES NFR BLD AUTO: 6.6 % — SIGNIFICANT CHANGE UP (ref 1.7–9.3)
NEUTROPHILS # BLD AUTO: 13.43 K/UL — HIGH (ref 1.4–6.5)
NEUTROPHILS NFR BLD AUTO: 86.4 % — HIGH (ref 42.2–75.2)
NRBC # BLD: 0 /100 WBCS — SIGNIFICANT CHANGE UP (ref 0–0)
PCO2 BLDV: 42 MMHG — SIGNIFICANT CHANGE UP (ref 39–42)
PH BLDV: 7.39 — SIGNIFICANT CHANGE UP (ref 7.32–7.43)
PLATELET # BLD AUTO: 285 K/UL — SIGNIFICANT CHANGE UP (ref 130–400)
PO2 BLDV: 44 MMHG — SIGNIFICANT CHANGE UP
POTASSIUM BLDV-SCNC: 3.8 MMOL/L — SIGNIFICANT CHANGE UP (ref 3.5–5.1)
POTASSIUM SERPL-MCNC: 3.8 MMOL/L — SIGNIFICANT CHANGE UP (ref 3.5–5)
POTASSIUM SERPL-SCNC: 3.8 MMOL/L — SIGNIFICANT CHANGE UP (ref 3.5–5)
PROT SERPL-MCNC: 4.6 G/DL — LOW (ref 6–8)
RBC # BLD: 2.77 M/UL — LOW (ref 4.2–5.4)
RBC # FLD: 15.8 % — HIGH (ref 11.5–14.5)
SAO2 % BLDV: 73.4 % — SIGNIFICANT CHANGE UP
SODIUM SERPL-SCNC: 131 MMOL/L — LOW (ref 135–146)
WBC # BLD: 15.53 K/UL — HIGH (ref 4.8–10.8)
WBC # FLD AUTO: 15.53 K/UL — HIGH (ref 4.8–10.8)

## 2022-02-20 PROCEDURE — 76705 ECHO EXAM OF ABDOMEN: CPT | Mod: 26

## 2022-02-20 PROCEDURE — 99233 SBSQ HOSP IP/OBS HIGH 50: CPT

## 2022-02-20 PROCEDURE — 71045 X-RAY EXAM CHEST 1 VIEW: CPT | Mod: 26

## 2022-02-20 PROCEDURE — 99232 SBSQ HOSP IP/OBS MODERATE 35: CPT

## 2022-02-20 RX ORDER — FUROSEMIDE 40 MG
60 TABLET ORAL ONCE
Refills: 0 | Status: COMPLETED | OUTPATIENT
Start: 2022-02-20 | End: 2022-02-20

## 2022-02-20 RX ORDER — HEPARIN SODIUM 5000 [USP'U]/ML
5000 INJECTION INTRAVENOUS; SUBCUTANEOUS EVERY 8 HOURS
Refills: 0 | Status: DISCONTINUED | OUTPATIENT
Start: 2022-02-20 | End: 2022-02-23

## 2022-02-20 RX ORDER — MAGNESIUM SULFATE 500 MG/ML
2 VIAL (ML) INJECTION
Refills: 0 | Status: COMPLETED | OUTPATIENT
Start: 2022-02-20 | End: 2022-02-20

## 2022-02-20 RX ADMIN — CHLORHEXIDINE GLUCONATE 15 MILLILITER(S): 213 SOLUTION TOPICAL at 18:07

## 2022-02-20 RX ADMIN — PANTOPRAZOLE SODIUM 40 MILLIGRAM(S): 20 TABLET, DELAYED RELEASE ORAL at 13:49

## 2022-02-20 RX ADMIN — HEPARIN SODIUM 5000 UNIT(S): 5000 INJECTION INTRAVENOUS; SUBCUTANEOUS at 05:40

## 2022-02-20 RX ADMIN — Medication 60 MILLIGRAM(S): at 14:28

## 2022-02-20 RX ADMIN — CHLORHEXIDINE GLUCONATE 1 APPLICATION(S): 213 SOLUTION TOPICAL at 05:42

## 2022-02-20 RX ADMIN — Medication 100 GRAM(S): at 04:00

## 2022-02-20 RX ADMIN — POLYETHYLENE GLYCOL 3350 17 GRAM(S): 17 POWDER, FOR SOLUTION ORAL at 05:44

## 2022-02-20 RX ADMIN — Medication 1 APPLICATION(S): at 05:44

## 2022-02-20 RX ADMIN — HEPARIN SODIUM 5000 UNIT(S): 5000 INJECTION INTRAVENOUS; SUBCUTANEOUS at 22:37

## 2022-02-20 RX ADMIN — Medication 60 MILLIGRAM(S): at 05:40

## 2022-02-20 RX ADMIN — CHLORHEXIDINE GLUCONATE 15 MILLILITER(S): 213 SOLUTION TOPICAL at 05:39

## 2022-02-20 RX ADMIN — Medication 100 GRAM(S): at 19:08

## 2022-02-20 RX ADMIN — Medication 1 APPLICATION(S): at 18:12

## 2022-02-20 RX ADMIN — Medication 25 GRAM(S): at 08:35

## 2022-02-20 RX ADMIN — LACTULOSE 20 GRAM(S): 10 SOLUTION ORAL at 05:39

## 2022-02-20 RX ADMIN — PIPERACILLIN AND TAZOBACTAM 200 GRAM(S): 4; .5 INJECTION, POWDER, LYOPHILIZED, FOR SOLUTION INTRAVENOUS at 14:27

## 2022-02-20 RX ADMIN — LACTULOSE 20 GRAM(S): 10 SOLUTION ORAL at 18:07

## 2022-02-20 RX ADMIN — POLYETHYLENE GLYCOL 3350 17 GRAM(S): 17 POWDER, FOR SOLUTION ORAL at 18:12

## 2022-02-20 RX ADMIN — Medication 1 APPLICATION(S): at 12:00

## 2022-02-20 RX ADMIN — PIPERACILLIN AND TAZOBACTAM 200 GRAM(S): 4; .5 INJECTION, POWDER, LYOPHILIZED, FOR SOLUTION INTRAVENOUS at 19:09

## 2022-02-20 RX ADMIN — Medication 100 GRAM(S): at 14:27

## 2022-02-20 RX ADMIN — PIPERACILLIN AND TAZOBACTAM 200 GRAM(S): 4; .5 INJECTION, POWDER, LYOPHILIZED, FOR SOLUTION INTRAVENOUS at 05:43

## 2022-02-20 RX ADMIN — Medication 25 GRAM(S): at 18:09

## 2022-02-20 RX ADMIN — HEPARIN SODIUM 5000 UNIT(S): 5000 INJECTION INTRAVENOUS; SUBCUTANEOUS at 14:34

## 2022-02-20 RX ADMIN — PIPERACILLIN AND TAZOBACTAM 200 GRAM(S): 4; .5 INJECTION, POWDER, LYOPHILIZED, FOR SOLUTION INTRAVENOUS at 00:43

## 2022-02-20 NOTE — PROGRESS NOTE ADULT - SUBJECTIVE AND OBJECTIVE BOX
Patient is a 88y old  Female who presents with a chief complaint of hypoxia (19 Feb 2022 09:16)        Over Night Events:  Remains on MV.  Sedated  Off pressors.          ROS:     All ROS are negative except HPI         PHYSICAL EXAM    ICU Vital Signs Last 24 Hrs  T(C): 36.2 (20 Feb 2022 04:00), Max: 36.4 (20 Feb 2022 00:00)  T(F): 97.2 (20 Feb 2022 04:00), Max: 97.5 (20 Feb 2022 00:00)  HR: 66 (20 Feb 2022 14:00) (55 - 81)  BP: 133/60 (20 Feb 2022 14:00) (94/44 - 214/95)  BP(mean): 86 (20 Feb 2022 14:00) (64 - 137)  ABP: --  ABP(mean): --  RR: 9 (20 Feb 2022 14:00) (9 - 23)  SpO2: 100% (20 Feb 2022 14:00) (97% - 100%)      CONSTITUTIONAL:  Ill appearing in  NAD    ENT:   Airway patent,   Mouth with normal mucosa.   No thrush    EYES:   Pupils equal,   Round and reactive to light.    CARDIAC:   Normal rate,   Regular rhythm.    No edema      Vascular:  Normal systolic impulse  No Carotid bruits    RESPIRATORY:   No wheezing  Bilateral crackles   Normal chest expansion  Not tachypneic,  No use of accessory muscles    GASTROINTESTINAL:  Abdomen soft,   Non-tender,   No guarding,   + BS    MUSCULOSKELETAL:   Range of motion is not limited,  No clubbing, cyanosis    NEUROLOGICAL:   Sedated     SKIN:   Skin normal color for race,     PSYCHIATRIC:   Sedated   No apparent risk to self or others.    HEMATOLOGICAL:  No cervical  lymphadenopathy.  no inguinal lymphadenopathy      02-19-22 @ 07:01  -  02-20-22 @ 07:00  --------------------------------------------------------  IN:    FentaNYL: 321.6 mL    Lactated Ringers: 1200 mL  Total IN: 1521.6 mL    OUT:    Indwelling Catheter - Urethral (mL): 1415 mL    Rectal Tube (mL): 30 mL  Total OUT: 1445 mL    Total NET: 76.6 mL      02-20-22 @ 07:01  -  02-20-22 @ 16:27  --------------------------------------------------------  IN:    Enteral Tube Flush: 175 mL    FentaNYL: 107.2 mL    Free Water: 500 mL  Total IN: 782.2 mL    OUT:    Indwelling Catheter - Urethral (mL): 1035 mL  Total OUT: 1035 mL    Total NET: -252.8 mL          LABS:                            8.2    15.53 )-----------( 285      ( 20 Feb 2022 05:30 )             24.9                                               02-20    131<L>  |  96<L>  |  20  ----------------------------<  94  3.8   |  24  |  <0.5<L>    Ca    7.9<L>      20 Feb 2022 05:30  Mg     1.6     02-20    TPro  4.6<L>  /  Alb  1.8<L>  /  TBili  1.7<H>  /  DBili  x   /  AST  64<H>  /  ALT  130<H>  /  AlkPhos  106  02-20                                                 CARDIAC MARKERS ( 20 Feb 2022 05:30 )  x     / x     / 480 U/L / x     / x                                                LIVER FUNCTIONS - ( 20 Feb 2022 05:30 )  Alb: 1.8 g/dL / Pro: 4.6 g/dL / ALK PHOS: 106 U/L / ALT: 130 U/L / AST: 64 U/L / GGT: x                                                                                               Mode: AC/ CMV (Assist Control/ Continuous Mandatory Ventilation)  RR (machine): 12  TV (machine): 300  FiO2: 30  PEEP: 5  ITime: 1.5  MAP: 8  PIP: 23                                          MEDICATIONS  (STANDING):  calcium gluconate IVPB 1 Gram(s) IV Intermittent <User Schedule>  chlorhexidine 0.12% Liquid 15 milliLiter(s) Oral Mucosa every 12 hours  chlorhexidine 4% Liquid 1 Application(s) Topical <User Schedule>  collagenase Ointment 1 Application(s) Topical two times a day  dextrose 40% Gel 15 Gram(s) Oral once  dextrose 5%. 1000 milliLiter(s) (100 mL/Hr) IV Continuous <Continuous>  dextrose 5%. 1000 milliLiter(s) (50 mL/Hr) IV Continuous <Continuous>  dextrose 50% Injectable 25 Gram(s) IV Push once  dextrose 50% Injectable 12.5 Gram(s) IV Push once  dextrose 50% Injectable 25 Gram(s) IV Push once  fentaNYL   Infusion. 0.587 MICROgram(s)/kG/Hr (2.25 mL/Hr) IV Continuous <Continuous>  glucagon  Injectable 1 milliGRAM(s) IntraMuscular once  heparin   Injectable 5000 Unit(s) SubCutaneous every 8 hours  influenza  Vaccine (HIGH DOSE) 0.7 milliLiter(s) IntraMuscular once  insulin lispro (ADMELOG) corrective regimen sliding scale   SubCutaneous every 6 hours  lactulose Syrup 20 Gram(s) Oral two times a day  magnesium sulfate  IVPB 2 Gram(s) IV Intermittent every 2 hours  norepinephrine Infusion 0.05 MICROgram(s)/kG/Min (4.22 mL/Hr) IV Continuous <Continuous>  pantoprazole  Injectable 40 milliGRAM(s) IV Push daily  piperacillin/tazobactam IVPB.. 2.25 Gram(s) IV Intermittent every 6 hours  polyethylene glycol 3350 17 Gram(s) Oral every 12 hours  predniSONE   Tablet 60 milliGRAM(s) Oral daily  vitamin A &amp; D Ointment 1 Application(s) Topical daily    MEDICATIONS  (PRN):  lactulose Retention Enema 200 Gram(s) Rectal two times a day PRN constipation  morphine  - Injectable 2 milliGRAM(s) IV Push three times a day PRN Severe Pain (7 - 10)      New X-rays reviewed:                                                                                  ECHO    CXR interpreted by me:  ET OG OK>  No infiltrates

## 2022-02-20 NOTE — PROGRESS NOTE ADULT - TIME BILLING
88F PMHx HTN here with acute hypoxic resp failure, s/p intubation. Torsades.    #Acute hypoxic resp failure; presumed due to aspiration  s/p intubation 2/9  cxr with bl opacities  cta chest no pe; c/w aspiration  sputum cx with pseudomonas  va duplex neg  zosyn  give lasix 60 iv x1 now  SAT, SBT  vent per icu  #Torsades de pointes  noted on 2/14, no further episodes  in setting of prolong qtc  tte with preserved ef; grade I diastolic dysfunction  qtc now 488  trend ekg  mg >2  monitor on tele  #Bullous pemphigoid  s/p skin bx 2/9  prednisone 60  f/u derm  #Covid  appears incidental  prednisone as above  cta as above  #Transaminitis, hepatocellular predom  ruq with gb wall edema; suspect due to volume overload  lasix as above  trend  #HTN  hold bp meds  #DVT ppx  subq hep    #Progress Note Handoff:  Pending (specify):  Consults_________, Tests________, Test Results_______, Other____intubated_____  Family discussion:  Disposition: Home___/SNF___/Other________/Unknown at this time____x____.

## 2022-02-20 NOTE — PROGRESS NOTE ADULT - SUBJECTIVE AND OBJECTIVE BOX
INTERVAL HPI/OVERNIGHT EVENTS:    SUBJECTIVE: Patient seen and examined at bedside.     unable to obtain ros    OBJECTIVE:    VITAL SIGNS:  Vital Signs Last 24 Hrs  T(C): 36.2 (20 Feb 2022 04:00), Max: 36.4 (20 Feb 2022 00:00)  T(F): 97.2 (20 Feb 2022 04:00), Max: 97.5 (20 Feb 2022 00:00)  HR: 57 (20 Feb 2022 09:03) (55 - 81)  BP: 94/44 (20 Feb 2022 08:00) (94/44 - 214/95)  BP(mean): 64 (20 Feb 2022 08:00) (64 - 137)  RR: 11 (20 Feb 2022 08:00) (11 - 27)  SpO2: 100% (20 Feb 2022 09:03) (97% - 100%)      PHYSICAL EXAM:    General: intubated  HEENT: NC/AT; PERRL, clear conjunctiva  Neck: supple  Respiratory: decreased bs at bases  Cardiovascular: +S1/S2; RRR  Abdomen: soft, NT/ND; +BS x4  Extremities: WWP, 2+ peripheral pulses b/l; no LE edema  Skin:   Neurological:    MEDICATIONS:  MEDICATIONS  (STANDING):  calcium gluconate IVPB 1 Gram(s) IV Intermittent <User Schedule>  chlorhexidine 0.12% Liquid 15 milliLiter(s) Oral Mucosa every 12 hours  chlorhexidine 4% Liquid 1 Application(s) Topical <User Schedule>  collagenase Ointment 1 Application(s) Topical two times a day  dextrose 40% Gel 15 Gram(s) Oral once  dextrose 5%. 1000 milliLiter(s) (50 mL/Hr) IV Continuous <Continuous>  dextrose 5%. 1000 milliLiter(s) (100 mL/Hr) IV Continuous <Continuous>  dextrose 50% Injectable 25 Gram(s) IV Push once  dextrose 50% Injectable 12.5 Gram(s) IV Push once  dextrose 50% Injectable 25 Gram(s) IV Push once  fentaNYL   Infusion. 0.587 MICROgram(s)/kG/Hr (2.25 mL/Hr) IV Continuous <Continuous>  furosemide   Injectable 60 milliGRAM(s) IV Push once  glucagon  Injectable 1 milliGRAM(s) IntraMuscular once  heparin   Injectable 5000 Unit(s) SubCutaneous every 12 hours  influenza  Vaccine (HIGH DOSE) 0.7 milliLiter(s) IntraMuscular once  insulin lispro (ADMELOG) corrective regimen sliding scale   SubCutaneous every 6 hours  lactulose Syrup 20 Gram(s) Oral two times a day  magnesium sulfate  IVPB 2 Gram(s) IV Intermittent every 2 hours  norepinephrine Infusion 0.05 MICROgram(s)/kG/Min (4.22 mL/Hr) IV Continuous <Continuous>  pantoprazole  Injectable 40 milliGRAM(s) IV Push daily  piperacillin/tazobactam IVPB.. 2.25 Gram(s) IV Intermittent every 6 hours  polyethylene glycol 3350 17 Gram(s) Oral every 12 hours  predniSONE   Tablet 60 milliGRAM(s) Oral daily  vitamin A &amp; D Ointment 1 Application(s) Topical daily    MEDICATIONS  (PRN):  lactulose Retention Enema 200 Gram(s) Rectal two times a day PRN constipation  morphine  - Injectable 2 milliGRAM(s) IV Push three times a day PRN Severe Pain (7 - 10)      ALLERGIES:  Allergies    No Known Allergies    Intolerances        LABS:                        8.2    15.53 )-----------( 285      ( 20 Feb 2022 05:30 )             24.9     Hemoglobin: 8.2 g/dL (02-20 @ 05:30)  Hemoglobin: 7.6 g/dL (02-19 @ 05:34)  Hemoglobin: 8.2 g/dL (02-18 @ 07:30)  Hemoglobin: 8.7 g/dL (02-17 @ 17:15)  Hemoglobin: 7.5 g/dL (02-17 @ 04:10)    CBC Full  -  ( 20 Feb 2022 05:30 )  WBC Count : 15.53 K/uL  RBC Count : 2.77 M/uL  Hemoglobin : 8.2 g/dL  Hematocrit : 24.9 %  Platelet Count - Automated : 285 K/uL  Mean Cell Volume : 89.9 fL  Mean Cell Hemoglobin : 29.6 pg  Mean Cell Hemoglobin Concentration : 32.9 g/dL  Auto Neutrophil # : 13.43 K/uL  Auto Lymphocyte # : 0.77 K/uL  Auto Monocyte # : 1.02 K/uL  Auto Eosinophil # : 0.04 K/uL  Auto Basophil # : 0.02 K/uL  Auto Neutrophil % : 86.4 %  Auto Lymphocyte % : 5.0 %  Auto Monocyte % : 6.6 %  Auto Eosinophil % : 0.3 %  Auto Basophil % : 0.1 %    02-20    131<L>  |  96<L>  |  20  ----------------------------<  94  3.8   |  24  |  <0.5<L>    Ca    7.9<L>      20 Feb 2022 05:30  Mg     1.6     02-20    TPro  4.6<L>  /  Alb  1.8<L>  /  TBili  1.7<H>  /  DBili  x   /  AST  64<H>  /  ALT  130<H>  /  AlkPhos  106  02-20    Creatinine Trend: <0.5<--, <0.5<--, 0.5<--, 0.5<--, 0.6<--, 0.6<--  LIVER FUNCTIONS - ( 20 Feb 2022 05:30 )  Alb: 1.8 g/dL / Pro: 4.6 g/dL / ALK PHOS: 106 U/L / ALT: 130 U/L / AST: 64 U/L / GGT: x               hs Troponin:        03:01 - VBG - pH: 7.39  | pCO2: 42    | pO2: 44    | Lactate: 1.30   17:20 - VBG - pH: 7.38  | pCO2: 42    | pO2: 42    | Lactate: 1.60         CSF:                      EKG:   MICROBIOLOGY:    IMAGING:      Labs, imaging, EKG personally reviewed    RADIOLOGY & ADDITIONAL TESTS: Reviewed.

## 2022-02-20 NOTE — PROGRESS NOTE ADULT - ASSESSMENT
IMPRESSION:  Acute Hypoxic Respiratory Failure   Possible aspiration  COVID 19 infection   JILL resolved   HO bullous pemphigoid     PLAN:    CNS: SAT.  Adequate sedation     HEENT: Oral care    PULMONARY:  HOB at 45 degrees.  SBT if awake.  No vent changes.  Will likely need tracheostomy       CARDIOVASCULAR:  Avoid overload.      GI: GI prophylaxis.  Bowel regimen.  OG Feeding.  KUB noted.  Enemas.  GI eval.       RENAL:  FU Lytes.  Correct as needed.      INFECTIOUS DISEASE:  ABX per ID.  Trend Procal.  Follow up cultures.    HEMATOLOGICAL:  DVT prophylaxis. LE duplex and CTA negative     ENDOCRINE:  Follow up FS. Insulin protocol if needed.  Steroids per burn and Derm      MUSCULOSKELETAL;  Bed rest.      Prognosis poor    Palliative care    GO

## 2022-02-21 LAB
ALBUMIN SERPL ELPH-MCNC: 2 G/DL — LOW (ref 3.5–5.2)
ALP SERPL-CCNC: 107 U/L — SIGNIFICANT CHANGE UP (ref 30–115)
ALT FLD-CCNC: 115 U/L — HIGH (ref 0–41)
ANION GAP SERPL CALC-SCNC: 11 MMOL/L — SIGNIFICANT CHANGE UP (ref 7–14)
ANION GAP SERPL CALC-SCNC: 11 MMOL/L — SIGNIFICANT CHANGE UP (ref 7–14)
AST SERPL-CCNC: 51 U/L — HIGH (ref 0–41)
BASE EXCESS BLDV CALC-SCNC: 4.1 MMOL/L — HIGH (ref -2–3)
BASOPHILS # BLD AUTO: 0.01 K/UL — SIGNIFICANT CHANGE UP (ref 0–0.2)
BASOPHILS NFR BLD AUTO: 0.1 % — SIGNIFICANT CHANGE UP (ref 0–1)
BILIRUB SERPL-MCNC: 1.5 MG/DL — HIGH (ref 0.2–1.2)
BUN SERPL-MCNC: 16 MG/DL — SIGNIFICANT CHANGE UP (ref 10–20)
BUN SERPL-MCNC: 17 MG/DL — SIGNIFICANT CHANGE UP (ref 10–20)
CA-I SERPL-SCNC: 1.13 MMOL/L — LOW (ref 1.15–1.33)
CALCIUM SERPL-MCNC: 6.9 MG/DL — LOW (ref 8.5–10.1)
CALCIUM SERPL-MCNC: 7.4 MG/DL — LOW (ref 8.5–10.1)
CHLORIDE SERPL-SCNC: 95 MMOL/L — LOW (ref 98–110)
CHLORIDE SERPL-SCNC: 97 MMOL/L — LOW (ref 98–110)
CO2 SERPL-SCNC: 26 MMOL/L — SIGNIFICANT CHANGE UP (ref 17–32)
CO2 SERPL-SCNC: 26 MMOL/L — SIGNIFICANT CHANGE UP (ref 17–32)
CREAT SERPL-MCNC: <0.5 MG/DL — LOW (ref 0.7–1.5)
CREAT SERPL-MCNC: <0.5 MG/DL — LOW (ref 0.7–1.5)
EOSINOPHIL # BLD AUTO: 0.01 K/UL — SIGNIFICANT CHANGE UP (ref 0–0.7)
EOSINOPHIL NFR BLD AUTO: 0.1 % — SIGNIFICANT CHANGE UP (ref 0–8)
GAS PNL BLDV: 129 MMOL/L — LOW (ref 136–145)
GAS PNL BLDV: SIGNIFICANT CHANGE UP
GLUCOSE BLDC GLUCOMTR-MCNC: 103 MG/DL — HIGH (ref 70–99)
GLUCOSE BLDC GLUCOMTR-MCNC: 125 MG/DL — HIGH (ref 70–99)
GLUCOSE BLDC GLUCOMTR-MCNC: 153 MG/DL — HIGH (ref 70–99)
GLUCOSE BLDC GLUCOMTR-MCNC: 193 MG/DL — HIGH (ref 70–99)
GLUCOSE BLDC GLUCOMTR-MCNC: 215 MG/DL — HIGH (ref 70–99)
GLUCOSE SERPL-MCNC: 115 MG/DL — HIGH (ref 70–99)
GLUCOSE SERPL-MCNC: 181 MG/DL — HIGH (ref 70–99)
HCO3 BLDV-SCNC: 28 MMOL/L — SIGNIFICANT CHANGE UP (ref 22–29)
HCT VFR BLD CALC: 24 % — LOW (ref 37–47)
HCT VFR BLDA CALC: 23 % — LOW (ref 39–51)
HGB BLD CALC-MCNC: 7.6 G/DL — LOW (ref 12.6–17.4)
HGB BLD-MCNC: 8 G/DL — LOW (ref 12–16)
IMM GRANULOCYTES NFR BLD AUTO: 1.2 % — HIGH (ref 0.1–0.3)
LACTATE BLDV-MCNC: 1.2 MMOL/L — SIGNIFICANT CHANGE UP (ref 0.5–2)
LYMPHOCYTES # BLD AUTO: 0.45 K/UL — LOW (ref 1.2–3.4)
LYMPHOCYTES # BLD AUTO: 3.4 % — LOW (ref 20.5–51.1)
MAGNESIUM SERPL-MCNC: 2 MG/DL — SIGNIFICANT CHANGE UP (ref 1.8–2.4)
MCHC RBC-ENTMCNC: 30 PG — SIGNIFICANT CHANGE UP (ref 27–31)
MCHC RBC-ENTMCNC: 33.3 G/DL — SIGNIFICANT CHANGE UP (ref 32–37)
MCV RBC AUTO: 89.9 FL — SIGNIFICANT CHANGE UP (ref 81–99)
MONOCYTES # BLD AUTO: 0.56 K/UL — SIGNIFICANT CHANGE UP (ref 0.1–0.6)
MONOCYTES NFR BLD AUTO: 4.2 % — SIGNIFICANT CHANGE UP (ref 1.7–9.3)
NEUTROPHILS # BLD AUTO: 12.23 K/UL — HIGH (ref 1.4–6.5)
NEUTROPHILS NFR BLD AUTO: 91 % — HIGH (ref 42.2–75.2)
NRBC # BLD: 0 /100 WBCS — SIGNIFICANT CHANGE UP (ref 0–0)
PCO2 BLDV: 41 MMHG — SIGNIFICANT CHANGE UP (ref 39–42)
PH BLDV: 7.45 — HIGH (ref 7.32–7.43)
PLATELET # BLD AUTO: 311 K/UL — SIGNIFICANT CHANGE UP (ref 130–400)
PO2 BLDV: 44 MMHG — SIGNIFICANT CHANGE UP
POTASSIUM BLDV-SCNC: 2.5 MMOL/L — CRITICAL LOW (ref 3.5–5.1)
POTASSIUM SERPL-MCNC: 2.9 MMOL/L — LOW (ref 3.5–5)
POTASSIUM SERPL-MCNC: 3.5 MMOL/L — SIGNIFICANT CHANGE UP (ref 3.5–5)
POTASSIUM SERPL-SCNC: 2.9 MMOL/L — LOW (ref 3.5–5)
POTASSIUM SERPL-SCNC: 3.5 MMOL/L — SIGNIFICANT CHANGE UP (ref 3.5–5)
PROCALCITONIN SERPL-MCNC: 0.35 NG/ML — HIGH (ref 0.02–0.1)
PROT SERPL-MCNC: 4.5 G/DL — LOW (ref 6–8)
RBC # BLD: 2.67 M/UL — LOW (ref 4.2–5.4)
RBC # FLD: 16.2 % — HIGH (ref 11.5–14.5)
SAO2 % BLDV: 77.1 % — SIGNIFICANT CHANGE UP
SODIUM SERPL-SCNC: 132 MMOL/L — LOW (ref 135–146)
SODIUM SERPL-SCNC: 134 MMOL/L — LOW (ref 135–146)
WBC # BLD: 13.42 K/UL — HIGH (ref 4.8–10.8)
WBC # FLD AUTO: 13.42 K/UL — HIGH (ref 4.8–10.8)

## 2022-02-21 PROCEDURE — 93010 ELECTROCARDIOGRAM REPORT: CPT

## 2022-02-21 PROCEDURE — 71045 X-RAY EXAM CHEST 1 VIEW: CPT | Mod: 26

## 2022-02-21 PROCEDURE — 99233 SBSQ HOSP IP/OBS HIGH 50: CPT

## 2022-02-21 PROCEDURE — 99232 SBSQ HOSP IP/OBS MODERATE 35: CPT

## 2022-02-21 RX ORDER — POTASSIUM CHLORIDE 20 MEQ
20 PACKET (EA) ORAL
Refills: 0 | Status: COMPLETED | OUTPATIENT
Start: 2022-02-21 | End: 2022-02-21

## 2022-02-21 RX ORDER — CALCIUM CARBONATE 500(1250)
1 TABLET ORAL EVERY 8 HOURS
Refills: 0 | Status: DISCONTINUED | OUTPATIENT
Start: 2022-02-21 | End: 2022-02-21

## 2022-02-21 RX ORDER — DEXMEDETOMIDINE HYDROCHLORIDE IN 0.9% SODIUM CHLORIDE 4 UG/ML
0.2 INJECTION INTRAVENOUS
Qty: 400 | Refills: 0 | Status: DISCONTINUED | OUTPATIENT
Start: 2022-02-21 | End: 2022-02-24

## 2022-02-21 RX ORDER — NOREPINEPHRINE BITARTRATE/D5W 8 MG/250ML
0.05 PLASTIC BAG, INJECTION (ML) INTRAVENOUS
Qty: 16 | Refills: 0 | Status: DISCONTINUED | OUTPATIENT
Start: 2022-02-21 | End: 2022-02-26

## 2022-02-21 RX ORDER — ACETAMINOPHEN 500 MG
650 TABLET ORAL ONCE
Refills: 0 | Status: COMPLETED | OUTPATIENT
Start: 2022-02-21 | End: 2022-02-22

## 2022-02-21 RX ADMIN — POLYETHYLENE GLYCOL 3350 17 GRAM(S): 17 POWDER, FOR SOLUTION ORAL at 06:29

## 2022-02-21 RX ADMIN — PANTOPRAZOLE SODIUM 40 MILLIGRAM(S): 20 TABLET, DELAYED RELEASE ORAL at 11:29

## 2022-02-21 RX ADMIN — Medication 100 GRAM(S): at 02:43

## 2022-02-21 RX ADMIN — Medication 1 APPLICATION(S): at 06:37

## 2022-02-21 RX ADMIN — Medication 50 MILLIEQUIVALENT(S): at 07:56

## 2022-02-21 RX ADMIN — PIPERACILLIN AND TAZOBACTAM 200 GRAM(S): 4; .5 INJECTION, POWDER, LYOPHILIZED, FOR SOLUTION INTRAVENOUS at 11:30

## 2022-02-21 RX ADMIN — PIPERACILLIN AND TAZOBACTAM 200 GRAM(S): 4; .5 INJECTION, POWDER, LYOPHILIZED, FOR SOLUTION INTRAVENOUS at 23:19

## 2022-02-21 RX ADMIN — Medication 1: at 11:30

## 2022-02-21 RX ADMIN — CHLORHEXIDINE GLUCONATE 1 APPLICATION(S): 213 SOLUTION TOPICAL at 06:26

## 2022-02-21 RX ADMIN — PIPERACILLIN AND TAZOBACTAM 200 GRAM(S): 4; .5 INJECTION, POWDER, LYOPHILIZED, FOR SOLUTION INTRAVENOUS at 00:17

## 2022-02-21 RX ADMIN — DEXMEDETOMIDINE HYDROCHLORIDE IN 0.9% SODIUM CHLORIDE 1.92 MICROGRAM(S)/KG/HR: 4 INJECTION INTRAVENOUS at 12:00

## 2022-02-21 RX ADMIN — LACTULOSE 20 GRAM(S): 10 SOLUTION ORAL at 06:28

## 2022-02-21 RX ADMIN — HEPARIN SODIUM 5000 UNIT(S): 5000 INJECTION INTRAVENOUS; SUBCUTANEOUS at 21:00

## 2022-02-21 RX ADMIN — Medication 60 MILLIGRAM(S): at 06:29

## 2022-02-21 RX ADMIN — HEPARIN SODIUM 5000 UNIT(S): 5000 INJECTION INTRAVENOUS; SUBCUTANEOUS at 14:19

## 2022-02-21 RX ADMIN — PIPERACILLIN AND TAZOBACTAM 200 GRAM(S): 4; .5 INJECTION, POWDER, LYOPHILIZED, FOR SOLUTION INTRAVENOUS at 17:44

## 2022-02-21 RX ADMIN — Medication 50 MILLIEQUIVALENT(S): at 06:53

## 2022-02-21 RX ADMIN — Medication 1: at 17:42

## 2022-02-21 RX ADMIN — HEPARIN SODIUM 5000 UNIT(S): 5000 INJECTION INTRAVENOUS; SUBCUTANEOUS at 06:28

## 2022-02-21 RX ADMIN — MORPHINE SULFATE 2 MILLIGRAM(S): 50 CAPSULE, EXTENDED RELEASE ORAL at 23:23

## 2022-02-21 RX ADMIN — PIPERACILLIN AND TAZOBACTAM 200 GRAM(S): 4; .5 INJECTION, POWDER, LYOPHILIZED, FOR SOLUTION INTRAVENOUS at 06:26

## 2022-02-21 RX ADMIN — CHLORHEXIDINE GLUCONATE 15 MILLILITER(S): 213 SOLUTION TOPICAL at 06:36

## 2022-02-21 RX ADMIN — POLYETHYLENE GLYCOL 3350 17 GRAM(S): 17 POWDER, FOR SOLUTION ORAL at 17:42

## 2022-02-21 RX ADMIN — CHLORHEXIDINE GLUCONATE 15 MILLILITER(S): 213 SOLUTION TOPICAL at 17:43

## 2022-02-21 RX ADMIN — Medication 2: at 23:19

## 2022-02-21 RX ADMIN — LACTULOSE 20 GRAM(S): 10 SOLUTION ORAL at 17:42

## 2022-02-21 RX ADMIN — Medication 1.8 MICROGRAM(S)/KG/MIN: at 11:29

## 2022-02-21 NOTE — PROGRESS NOTE ADULT - ASSESSMENT
87 y/o F w/ PMH/o HTN (as per son at bedside) presenting to the hospital BIBA after experiencing respiratory distress. According to the patients family, the patient was being fed when she began choking and had difficulty breathing. Pt was bag masked by EMS and transported to the hospital. According to the family, the patient began to experience diffuse skin reaction across her entire body as well as ulcerations in her mouth for the past month. The family denied noticing fever or chills.    # Acute hypoxemic respiratory failure 2/2 aspiration  - Intubated on arrival  - Vent settings 12/300/5/30%   - DTA 2/9 negative  - Blood cx 2/8 negative     #Elevated D dimer  - CTA and duplex both negative 2/18  - D Dimer 2/20--->2123    # Episode of torsades on 2/14  - QTc 2/14: 733, repeat QTc 447 on 2/19; daily EKG's  - Keep K >4, Mg>2  - continue calcium gluconate 1g q8 as per nephro recs  - Cardio following  - TTE 2/14: EF 60-65%  -levo started as pt hypotensive this morning    # Hypernatremia, resolved  # JILL  - Na >180 on presentation  - BUN/Cr 116/3.3 on presentation (baseline Cr 0.5)   - Trend BMP  - Goal decrease by 8-10 per 24hrs  - S/p Calcium gluconate as per nephro  - Na WNL now    # 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  - S/p Unasyn (2/9-2/15)  - Biopsy 2/9: bullous pemphigoid  - C/w prednisone (start 2/11)  - S/p doxycycline (2/12-2/15)  - Derm on board  - C/w zosyn as per ID (start 2/15)    # COVID-19  - COVID + on 2/15  - On prednisone 60 daily  - Ferritin 521, procal 1.14, ,   - ID following    DVT ppx: HSQ  GI ppx: protonix  Dispo: acute  Right radial A line 2/9 -> dc'ed 2/12  Left fem A line 2/13 -> dc'ed 2/17  R IJ TLC 2/14

## 2022-02-21 NOTE — PROGRESS NOTE ADULT - TIME BILLING
#Acute hypoxic resp failure; presumed due to aspiration  s/p intubation 2/9  cxr with bl opacities  cta chest no pe; c/w aspiration  sputum cx with pseudomonas  va duplex neg  zosyn  off sedation, try SBT today  vent per icu  #Torsades de pointes  noted on 2/14, no further episodes  in setting of prolong qtc  tte with preserved ef; grade I diastolic dysfunction  qtc now 488  trend ekg  mg >2  monitor on tele  #Bullous pemphigoid  s/p skin bx 2/9  prednisone 60  f/u derm  #Covid  appears incidental  prednisone as above  cta as above  #Transaminitis, hepatocellular predom  ruq with gb wall edema; suspect due to volume overload  lasix as above  trend    #Progress Note Handoff:  Pending (specify):  Consults_________, Tests________, Test Results_______, Other____intubated_____  Family discussion:  Disposition: Home___/SNF___/Other________/Unknown at this time____x____.

## 2022-02-21 NOTE — PROGRESS NOTE ADULT - SUBJECTIVE AND OBJECTIVE BOX
----------Daily Progress Note----------    HISTORY OF PRESENT ILLNESS:  Patient is a 88y old Female who presents with a chief complaint of hypoxia (19 Feb 2022 09:16)    Currently admitted to medicine with the primary diagnosis of Acute respiratory failure with hypoxia       Today is hospital day 12d.     INTERVAL HOSPITAL COURSE / OVERNIGHT EVENTS:    No events overnight. Pt intubated and sedated    Review of Systems: Otherwise unremarkable     <<<<<PAST MEDICAL & SURGICAL HISTORY>>>>>    ALLERGIES  No Known Allergies      Home Medications:        MEDICATIONS  STANDING MEDICATIONS  calcium gluconate IVPB 1 Gram(s) IV Intermittent <User Schedule>  chlorhexidine 0.12% Liquid 15 milliLiter(s) Oral Mucosa every 12 hours  chlorhexidine 4% Liquid 1 Application(s) Topical <User Schedule>  collagenase Ointment 1 Application(s) Topical two times a day  dextrose 40% Gel 15 Gram(s) Oral once  dextrose 5%. 1000 milliLiter(s) IV Continuous <Continuous>  dextrose 5%. 1000 milliLiter(s) IV Continuous <Continuous>  dextrose 50% Injectable 25 Gram(s) IV Push once  dextrose 50% Injectable 12.5 Gram(s) IV Push once  dextrose 50% Injectable 25 Gram(s) IV Push once  fentaNYL   Infusion. 0.587 MICROgram(s)/kG/Hr IV Continuous <Continuous>  glucagon  Injectable 1 milliGRAM(s) IntraMuscular once  heparin   Injectable 5000 Unit(s) SubCutaneous every 8 hours  influenza  Vaccine (HIGH DOSE) 0.7 milliLiter(s) IntraMuscular once  insulin lispro (ADMELOG) corrective regimen sliding scale   SubCutaneous every 6 hours  lactulose Syrup 20 Gram(s) Oral two times a day  norepinephrine Infusion 0.05 MICROgram(s)/kG/Min IV Continuous <Continuous>  pantoprazole  Injectable 40 milliGRAM(s) IV Push daily  piperacillin/tazobactam IVPB.. 2.25 Gram(s) IV Intermittent every 6 hours  polyethylene glycol 3350 17 Gram(s) Oral every 12 hours  predniSONE   Tablet 60 milliGRAM(s) Oral daily  vitamin A &amp; D Ointment 1 Application(s) Topical daily    PRN MEDICATIONS  lactulose Retention Enema 200 Gram(s) Rectal two times a day PRN  morphine  - Injectable 2 milliGRAM(s) IV Push three times a day PRN    VITALS:  T(F): 99.2  HR: 82  BP: 100/49  RR: 24  SpO2: 96%    <<<<<LABS>>>>>                        8.0    13.42 )-----------( 311      ( 21 Feb 2022 06:21 )             24.0     02-21    132<L>  |  95<L>  |  17  ----------------------------<  115<H>  2.9<L>   |  26  |  <0.5<L>    Ca    7.4<L>      21 Feb 2022 06:21  Mg     2.0     02-21    TPro  4.5<L>  /  Alb  2.0<L>  /  TBili  1.5<H>  /  DBili  x   /  AST  51<H>  /  ALT  115<H>  /  AlkPhos  107  02-21            [id number]  CARDIAC MARKERS ( 20 Feb 2022 05:30 )  x     / x     / 480 U/L / x     / x            <<<<<RADIOLOGY>>>>>    < from: Xray Chest 1 View- PORTABLE-Routine (Xray Chest 1 View- PORTABLE-Routine in AM.) (02.21.22 @ 06:05) >  PROCEDURE DATE:  02/21/2022          INTERPRETATION:  Clinical History / Reason for exam: Line placement    Comparison : Chest radiograph February 20, 2022.    Technique/Positioning: Single AP view of the chest.    Findings:    Support devices: Endotracheal tube, feeding tube and right IJ central   venous catheter are unchanged.    Cardiac/mediastinum/hilum: Unchanged.    Lung parenchyma/Pleura: Bilateral opacities, unchanged. No pneumothorax.    Skeleton/soft tissues: Unchanged.    Impression:    Bilateral opacities, unchanged.    < end of copied text >      <<<<<PHYSICAL EXAM>>>>>  GENERAL: intubated and sedated  PULMONARY: Clear to auscultation bilaterally. Intubed  CARDIOVASCULAR: Regular rate and rhythm, S1-S2, no murmurs  GASTROINTESTINAL: Soft, non-tender, non-distended, no guarding.  SKIN/EXTREMITIES: diffuse bullae  NEUROLOGIC: intubated and sedated      -----------------------------------------------------------------------------------------------------------------------------------------------------------------------------------------------

## 2022-02-21 NOTE — PROGRESS NOTE ADULT - ASSESSMENT
IMPRESSION:  Acute Hypoxic Respiratory Failure   Possible aspiration  COVID 19 infection   JILL resolved   HO bullous pemphigoid     PLAN:    CNS: SAT.  Adequate sedation     HEENT: Oral care    PULMONARY:  HOB at 45 degrees.  No vent changes.  Will likely need tracheostomy       CARDIOVASCULAR:  Avoid overload.  Keep even balance     GI: GI prophylaxis.  Bowel regimen.  OG Feeding.     RENAL:  FU Lytes.  Correct as needed.      INFECTIOUS DISEASE:  ABX per ID.  Trend Procal.  Follow up cultures.    HEMATOLOGICAL:  DVT prophylaxis. LE duplex and CTA negative     ENDOCRINE:  Follow up FS. Insulin protocol if needed.  Steroids per burn and Derm      MUSCULOSKELETAL;  Bed rest.      Prognosis poor

## 2022-02-21 NOTE — PROGRESS NOTE ADULT - SUBJECTIVE AND OBJECTIVE BOX
Patient is a 88y old  Female who presents with a chief complaint of hypoxia (19 Feb 2022 09:16)        Over Night Events:  Remains critically ill on MV.  Sedated.          ROS:     All ROS are negative except HPI         PHYSICAL EXAM    ICU Vital Signs Last 24 Hrs  T(C): 37.3 (20 Feb 2022 16:00), Max: 37.3 (20 Feb 2022 16:00)  T(F): 99.2 (20 Feb 2022 16:00), Max: 99.2 (20 Feb 2022 16:00)  HR: 94 (21 Feb 2022 07:00) (55 - 94)  BP: 116/59 (21 Feb 2022 06:00) (89/40 - 169/77)  BP(mean): 82 (21 Feb 2022 06:00) (58 - 110)  ABP: --  ABP(mean): --  RR: 31 (21 Feb 2022 07:00) (1 - 41)  SpO2: 94% (21 Feb 2022 07:00) (94% - 100%)      CONSTITUTIONAL:  Ill appearing in NAD    ENT:   Airway patent,   Mouth with normal mucosa.   No thrush    EYES:   Pupils equal,   Round and reactive to light.    CARDIAC:   Tachycardic    Regular rhythm.    edema      Vascular:  Normal systolic impulse  No Carotid bruits    RESPIRATORY:   No wheezing  Bilateral BS  Normal chest expansion  Not tachypneic,  No use of accessory muscles    GASTROINTESTINAL:  Abdomen soft,   Non-tender,   No guarding,   + BS    MUSCULOSKELETAL:   Range of motion is not limited,  No clubbing, cyanosis    NEUROLOGICAL:   Sedated    SKIN:   Skin normal color for race,     PSYCHIATRIC:   Sedated   No apparent risk to self or others.    HEMATOLOGICAL:  No cervical  lymphadenopathy.  no inguinal lymphadenopathy      02-20-22 @ 07:01  -  02-21-22 @ 07:00  --------------------------------------------------------  IN:    Enteral Tube Flush: 50 mL    FentaNYL: 218.2 mL    Free Water: 400 mL    Glucerna 1.5: 735 mL    IV PiggyBack: 650 mL  Total IN: 2053.2 mL    OUT:    Indwelling Catheter - Urethral (mL): 2880 mL  Total OUT: 2880 mL    Total NET: -826.8 mL          LABS:                            8.0    13.42 )-----------( 311      ( 21 Feb 2022 06:21 )             24.0                                               02-21    132<L>  |  95<L>  |  17  ----------------------------<  115<H>  2.9<L>   |  26  |  <0.5<L>    Ca    7.4<L>      21 Feb 2022 06:21  Mg     2.0     02-21    TPro  4.5<L>  /  Alb  2.0<L>  /  TBili  1.5<H>  /  DBili  x   /  AST  51<H>  /  ALT  115<H>  /  AlkPhos  107  02-21                                                 CARDIAC MARKERS ( 20 Feb 2022 05:30 )  x     / x     / 480 U/L / x     / x                                                LIVER FUNCTIONS - ( 21 Feb 2022 06:21 )  Alb: 2.0 g/dL / Pro: 4.5 g/dL / ALK PHOS: 107 U/L / ALT: 115 U/L / AST: 51 U/L / GGT: x                                                                                               Mode: AC/ CMV (Assist Control/ Continuous Mandatory Ventilation)  RR (machine): 12  TV (machine): 300  FiO2: 30  PEEP: 5  ITime: 1.5  MAP: 7  PIP: 24                                          MEDICATIONS  (STANDING):  calcium gluconate IVPB 1 Gram(s) IV Intermittent <User Schedule>  chlorhexidine 0.12% Liquid 15 milliLiter(s) Oral Mucosa every 12 hours  chlorhexidine 4% Liquid 1 Application(s) Topical <User Schedule>  collagenase Ointment 1 Application(s) Topical two times a day  dextrose 40% Gel 15 Gram(s) Oral once  dextrose 5%. 1000 milliLiter(s) (100 mL/Hr) IV Continuous <Continuous>  dextrose 5%. 1000 milliLiter(s) (50 mL/Hr) IV Continuous <Continuous>  dextrose 50% Injectable 25 Gram(s) IV Push once  dextrose 50% Injectable 12.5 Gram(s) IV Push once  dextrose 50% Injectable 25 Gram(s) IV Push once  fentaNYL   Infusion. 0.587 MICROgram(s)/kG/Hr (2.25 mL/Hr) IV Continuous <Continuous>  glucagon  Injectable 1 milliGRAM(s) IntraMuscular once  heparin   Injectable 5000 Unit(s) SubCutaneous every 8 hours  influenza  Vaccine (HIGH DOSE) 0.7 milliLiter(s) IntraMuscular once  insulin lispro (ADMELOG) corrective regimen sliding scale   SubCutaneous every 6 hours  lactulose Syrup 20 Gram(s) Oral two times a day  norepinephrine Infusion 0.05 MICROgram(s)/kG/Min (4.22 mL/Hr) IV Continuous <Continuous>  pantoprazole  Injectable 40 milliGRAM(s) IV Push daily  piperacillin/tazobactam IVPB.. 2.25 Gram(s) IV Intermittent every 6 hours  polyethylene glycol 3350 17 Gram(s) Oral every 12 hours  potassium chloride  20 mEq/100 mL IVPB 20 milliEquivalent(s) IV Intermittent every 2 hours  predniSONE   Tablet 60 milliGRAM(s) Oral daily  vitamin A &amp; D Ointment 1 Application(s) Topical daily    MEDICATIONS  (PRN):  lactulose Retention Enema 200 Gram(s) Rectal two times a day PRN constipation  morphine  - Injectable 2 milliGRAM(s) IV Push three times a day PRN Severe Pain (7 - 10)      New X-rays reviewed:                                                                                  ECHO    CXR interpreted by me:  ET OG OK>  no infiltrates

## 2022-02-22 LAB
ALBUMIN SERPL ELPH-MCNC: 1.7 G/DL — LOW (ref 3.5–5.2)
ALP SERPL-CCNC: 101 U/L — SIGNIFICANT CHANGE UP (ref 30–115)
ALT FLD-CCNC: 85 U/L — HIGH (ref 0–41)
ANION GAP SERPL CALC-SCNC: 11 MMOL/L — SIGNIFICANT CHANGE UP (ref 7–14)
ANION GAP SERPL CALC-SCNC: 12 MMOL/L — SIGNIFICANT CHANGE UP (ref 7–14)
APTT BLD: 30 SEC — SIGNIFICANT CHANGE UP (ref 27–39.2)
AST SERPL-CCNC: 33 U/L — SIGNIFICANT CHANGE UP (ref 0–41)
BASOPHILS # BLD AUTO: 0.02 K/UL — SIGNIFICANT CHANGE UP (ref 0–0.2)
BASOPHILS NFR BLD AUTO: 0.1 % — SIGNIFICANT CHANGE UP (ref 0–1)
BILIRUB SERPL-MCNC: 0.9 MG/DL — SIGNIFICANT CHANGE UP (ref 0.2–1.2)
BLD GP AB SCN SERPL QL: SIGNIFICANT CHANGE UP
BUN SERPL-MCNC: 16 MG/DL — SIGNIFICANT CHANGE UP (ref 10–20)
BUN SERPL-MCNC: 19 MG/DL — SIGNIFICANT CHANGE UP (ref 10–20)
CALCIUM SERPL-MCNC: 6.8 MG/DL — LOW (ref 8.5–10.1)
CALCIUM SERPL-MCNC: 6.9 MG/DL — LOW (ref 8.5–10.1)
CHLORIDE SERPL-SCNC: 96 MMOL/L — LOW (ref 98–110)
CHLORIDE SERPL-SCNC: 96 MMOL/L — LOW (ref 98–110)
CO2 SERPL-SCNC: 25 MMOL/L — SIGNIFICANT CHANGE UP (ref 17–32)
CO2 SERPL-SCNC: 25 MMOL/L — SIGNIFICANT CHANGE UP (ref 17–32)
CREAT SERPL-MCNC: <0.5 MG/DL — LOW (ref 0.7–1.5)
CREAT SERPL-MCNC: <0.5 MG/DL — LOW (ref 0.7–1.5)
EOSINOPHIL # BLD AUTO: 0 K/UL — SIGNIFICANT CHANGE UP (ref 0–0.7)
EOSINOPHIL NFR BLD AUTO: 0 % — SIGNIFICANT CHANGE UP (ref 0–8)
GLUCOSE BLDC GLUCOMTR-MCNC: 167 MG/DL — HIGH (ref 70–99)
GLUCOSE BLDC GLUCOMTR-MCNC: 167 MG/DL — HIGH (ref 70–99)
GLUCOSE BLDC GLUCOMTR-MCNC: 172 MG/DL — HIGH (ref 70–99)
GLUCOSE SERPL-MCNC: 150 MG/DL — HIGH (ref 70–99)
GLUCOSE SERPL-MCNC: 161 MG/DL — HIGH (ref 70–99)
HCT VFR BLD CALC: 22.4 % — LOW (ref 37–47)
HGB BLD-MCNC: 7.5 G/DL — LOW (ref 12–16)
IMM GRANULOCYTES NFR BLD AUTO: 0.9 % — HIGH (ref 0.1–0.3)
INR BLD: 1.23 RATIO — SIGNIFICANT CHANGE UP (ref 0.65–1.3)
LYMPHOCYTES # BLD AUTO: 0.67 K/UL — LOW (ref 1.2–3.4)
LYMPHOCYTES # BLD AUTO: 3.6 % — LOW (ref 20.5–51.1)
MAGNESIUM SERPL-MCNC: 1.7 MG/DL — LOW (ref 1.8–2.4)
MCHC RBC-ENTMCNC: 30.5 PG — SIGNIFICANT CHANGE UP (ref 27–31)
MCHC RBC-ENTMCNC: 33.5 G/DL — SIGNIFICANT CHANGE UP (ref 32–37)
MCV RBC AUTO: 91.1 FL — SIGNIFICANT CHANGE UP (ref 81–99)
MONOCYTES # BLD AUTO: 0.51 K/UL — SIGNIFICANT CHANGE UP (ref 0.1–0.6)
MONOCYTES NFR BLD AUTO: 2.8 % — SIGNIFICANT CHANGE UP (ref 1.7–9.3)
NEUTROPHILS # BLD AUTO: 17.08 K/UL — HIGH (ref 1.4–6.5)
NEUTROPHILS NFR BLD AUTO: 92.6 % — HIGH (ref 42.2–75.2)
NRBC # BLD: 0 /100 WBCS — SIGNIFICANT CHANGE UP (ref 0–0)
PLATELET # BLD AUTO: 298 K/UL — SIGNIFICANT CHANGE UP (ref 130–400)
POTASSIUM SERPL-MCNC: 3.4 MMOL/L — LOW (ref 3.5–5)
POTASSIUM SERPL-MCNC: 4.6 MMOL/L — SIGNIFICANT CHANGE UP (ref 3.5–5)
POTASSIUM SERPL-SCNC: 3.4 MMOL/L — LOW (ref 3.5–5)
POTASSIUM SERPL-SCNC: 4.6 MMOL/L — SIGNIFICANT CHANGE UP (ref 3.5–5)
PROT SERPL-MCNC: 4.2 G/DL — LOW (ref 6–8)
PROTHROM AB SERPL-ACNC: 14.1 SEC — HIGH (ref 9.95–12.87)
RBC # BLD: 2.46 M/UL — LOW (ref 4.2–5.4)
RBC # FLD: 17.9 % — HIGH (ref 11.5–14.5)
SARS-COV-2 RNA SPEC QL NAA+PROBE: SIGNIFICANT CHANGE UP
SODIUM SERPL-SCNC: 132 MMOL/L — LOW (ref 135–146)
SODIUM SERPL-SCNC: 133 MMOL/L — LOW (ref 135–146)
WBC # BLD: 18.45 K/UL — HIGH (ref 4.8–10.8)
WBC # FLD AUTO: 18.45 K/UL — HIGH (ref 4.8–10.8)

## 2022-02-22 PROCEDURE — 93010 ELECTROCARDIOGRAM REPORT: CPT

## 2022-02-22 PROCEDURE — 99221 1ST HOSP IP/OBS SF/LOW 40: CPT

## 2022-02-22 PROCEDURE — 71045 X-RAY EXAM CHEST 1 VIEW: CPT | Mod: 26

## 2022-02-22 PROCEDURE — 99232 SBSQ HOSP IP/OBS MODERATE 35: CPT

## 2022-02-22 PROCEDURE — 99233 SBSQ HOSP IP/OBS HIGH 50: CPT

## 2022-02-22 RX ORDER — MAGNESIUM SULFATE 500 MG/ML
2 VIAL (ML) INJECTION ONCE
Refills: 0 | Status: COMPLETED | OUTPATIENT
Start: 2022-02-22 | End: 2022-02-22

## 2022-02-22 RX ORDER — POTASSIUM CHLORIDE 20 MEQ
20 PACKET (EA) ORAL
Refills: 0 | Status: COMPLETED | OUTPATIENT
Start: 2022-02-22 | End: 2022-02-22

## 2022-02-22 RX ADMIN — Medication 60 MILLIGRAM(S): at 05:21

## 2022-02-22 RX ADMIN — HEPARIN SODIUM 5000 UNIT(S): 5000 INJECTION INTRAVENOUS; SUBCUTANEOUS at 05:21

## 2022-02-22 RX ADMIN — Medication 50 MILLIEQUIVALENT(S): at 13:15

## 2022-02-22 RX ADMIN — PANTOPRAZOLE SODIUM 40 MILLIGRAM(S): 20 TABLET, DELAYED RELEASE ORAL at 11:08

## 2022-02-22 RX ADMIN — Medication 50 MILLIEQUIVALENT(S): at 10:42

## 2022-02-22 RX ADMIN — Medication 650 MILLIGRAM(S): at 02:11

## 2022-02-22 RX ADMIN — CHLORHEXIDINE GLUCONATE 15 MILLILITER(S): 213 SOLUTION TOPICAL at 17:43

## 2022-02-22 RX ADMIN — HEPARIN SODIUM 5000 UNIT(S): 5000 INJECTION INTRAVENOUS; SUBCUTANEOUS at 13:16

## 2022-02-22 RX ADMIN — Medication 1: at 11:19

## 2022-02-22 RX ADMIN — HEPARIN SODIUM 5000 UNIT(S): 5000 INJECTION INTRAVENOUS; SUBCUTANEOUS at 21:27

## 2022-02-22 RX ADMIN — PIPERACILLIN AND TAZOBACTAM 200 GRAM(S): 4; .5 INJECTION, POWDER, LYOPHILIZED, FOR SOLUTION INTRAVENOUS at 11:08

## 2022-02-22 RX ADMIN — Medication 1: at 05:32

## 2022-02-22 RX ADMIN — LACTULOSE 20 GRAM(S): 10 SOLUTION ORAL at 05:21

## 2022-02-22 RX ADMIN — PIPERACILLIN AND TAZOBACTAM 200 GRAM(S): 4; .5 INJECTION, POWDER, LYOPHILIZED, FOR SOLUTION INTRAVENOUS at 17:43

## 2022-02-22 RX ADMIN — Medication 1 APPLICATION(S): at 17:43

## 2022-02-22 RX ADMIN — MORPHINE SULFATE 2 MILLIGRAM(S): 50 CAPSULE, EXTENDED RELEASE ORAL at 00:35

## 2022-02-22 RX ADMIN — Medication 1: at 17:43

## 2022-02-22 RX ADMIN — Medication 25 GRAM(S): at 08:08

## 2022-02-22 RX ADMIN — MORPHINE SULFATE 2 MILLIGRAM(S): 50 CAPSULE, EXTENDED RELEASE ORAL at 13:16

## 2022-02-22 RX ADMIN — POLYETHYLENE GLYCOL 3350 17 GRAM(S): 17 POWDER, FOR SOLUTION ORAL at 05:21

## 2022-02-22 RX ADMIN — Medication 1 APPLICATION(S): at 05:22

## 2022-02-22 RX ADMIN — CHLORHEXIDINE GLUCONATE 15 MILLILITER(S): 213 SOLUTION TOPICAL at 05:21

## 2022-02-22 RX ADMIN — CHLORHEXIDINE GLUCONATE 1 APPLICATION(S): 213 SOLUTION TOPICAL at 05:21

## 2022-02-22 RX ADMIN — Medication 50 MILLIEQUIVALENT(S): at 09:25

## 2022-02-22 RX ADMIN — Medication 1 APPLICATION(S): at 11:08

## 2022-02-22 RX ADMIN — PIPERACILLIN AND TAZOBACTAM 200 GRAM(S): 4; .5 INJECTION, POWDER, LYOPHILIZED, FOR SOLUTION INTRAVENOUS at 05:22

## 2022-02-22 NOTE — PRE-ANESTHESIA EVALUATION ADULT - NSRADCARDRESULTSFT_GEN_ALL_CORE
2/14/22 TTE Summary:   1. Left ventricular ejection fraction, by visual estimation, is 60 to   65%.   2. Normal global left ventricular systolic function.   3. Spectral Doppler shows impaired relaxation pattern of left   ventricular myocardial filling (Grade I diastolic dysfunction).    PHYSICIAN INTERPRETATION:  Left Ventricle: The left ventricular internal cavity size is normal. Left   ventricular wall thickness is normal. Global LV systolic function was   normal. Left ventricular ejection fraction, by visual estimation, is 60   to 65%. Spectral Doppler shows impaired relaxation pattern of left   ventricular myocardial filling (Grade I diastolic dysfunction).  Right Ventricle: Normal right ventricular size and function.  Left Atrium: Normal left atrial size.  Right Atrium: Normal right atrial size.  Pericardium: There is no evidence of pericardial effusion.  Mitral Valve: There is mild mitral annular calcification. No evidence of   mitral stenosis. No mitral regurgitation.  Tricuspid Valve: The tricuspid valve is grossly normal in structure. No   tricuspid regurgitation.  Aortic Valve: The aortic valve was not well visualized. Peak transaortic   gradient equals 8.4 mmHg, mean transaortic gradient equals 5.5 mmHg, the   calculated aortic valve area equals 2.86 cm² by the continuity equation   consistent with normally opening aortic valve. Trivial aortic   regurgitation.  Pulmonic Valve: The pulmonic valve was not well visualized.  Pulmonary Artery: The pulmonary artery is not well visualized.  Venous: The inferior vena cava was normal sized, with respiratory size   variation greater than 50%.

## 2022-02-22 NOTE — PROGRESS NOTE ADULT - ASSESSMENT
· Assessment	  89 y/o F w/ PMH/o HTN (as per son at bedside) presenting to the hospital BIBA after experiencing respiratory distress. According to the patients family, the patient was being fed when she began choking and had difficulty breathing. Pt was bag masked by EMS and transported to the hospital. According to the family, the patient began to experience diffuse bullae across her entire body as well as ulcerations in her mouth for the past month. The family denied noticing fever or chills.  In the ED, patient was tachypneic and hypoxic,  intubated for airway protection. Pt was found to hypothermic, started on warming blanket. Pt started on IV levophed. S/p IV vanc/ cefepime.    IMPRESSION;  Diffuse bullae : s/p Bx 2/9 > bullous pemphigoid  MSOF  JILL > resolved  Aspiration with chemical pneumonitis   2/9 Sputum > P aeruginosa > colonizer ?  2/8 BCx NG  2/9 UCx NG  2/18 CT : no PE    COVID-19 NG 1/9  COVID-19 positive 1/15  Ddimers 3908  Ferritin 521  Procal 1.14    CXR opacities right  Presently on treatment for possible bacterial PNA right  Pt was in the early viral replicative phase based on the timeline/onset of symptoms.       RECOMMENDATIONS;  Zosyn 2.250 gm iv q6h  Dexamethasone 6 mg iv q24h for 10 days.  Monitor for side effects: hyperglycemia, neurological ( agitation/confusion), adrenal suppression, bacterial and fungal infections   Anticoagulation as per team.   Off loading to prevent pressure sores and preventive measures to avoid aspiration   If any questions , please call [phone] or send a message on [facility] teams  Please update ID in real time with any pertinent new laboratory /microbiological/radiographically findings or a change in the clinical characteristics   Prognosis is extremely poor given her advanced age and poor nutritional status

## 2022-02-22 NOTE — CONSULT NOTE ADULT - SUBJECTIVE AND OBJECTIVE BOX
GENERAL SURGERY CONSULT NOTE    Patient: ARI MA , 88y ([birth year])Female   MRN: [id number]  [facility]  Visit: 02-09-22 Inpatient  Date: 02-22-22 @ 11:08    HPI:  87 y/o F w/ PMH/o HTN (as per son at bedside) presenting to the hospital BIBA after experiencing respiratory distress. According to the patients family, the patient was being fed when she began choking and had difficulty breathing. Pt was bag masked by EMS and transported to the hospital. According to the family, the patient began to experience diffuse skin reaction across her entire body as well as ulcerations in her mouth for the past month. The family denied noticing fever or chills.    In the ED, patient was tachypneic and hypoxic,  intubated for airway protection. Pt was found to hypothermic, started on warming blanket. Pt started on IV levophed. S/p IV vanc/ cefepime. called to evaluate   (09 Feb 2022 04:51)    Today surgery was consulted because patient requires a tracheostomy and PEG placement. Patient has been intubated since arrival on 2/9/22, and the primary team has been unable to wean from vent.  Patient has failed multiple spontaneous breathing trials. Family is aware of plan for trach and peg and agrees.     PAST MEDICAL & SURGICAL HISTORY:      Home Medications:        VITALS:  T(F): 97.9 (02-22-22 @ 08:00), Max: 101.1 (02-21-22 @ 16:00)  HR: 68 (02-22-22 @ 10:45) (66 - 106)  BP: 105/56 (02-22-22 @ 10:30) (83/41 - 163/71)  RR: 24 (02-22-22 @ 10:45) (18 - 35)  SpO2: 96% (02-22-22 @ 10:45) (94% - 100%)    PHYSICAL EXAM:  General: Intubated, sedated  Cardiac: RRR S1, S2  Respiratory: Intubated  Abdomen: Soft, non-distended, non-tender, no rebound, no guarding. No abdominal scars    MEDICATIONS  (STANDING):  chlorhexidine 0.12% Liquid 15 milliLiter(s) Oral Mucosa every 12 hours  chlorhexidine 4% Liquid 1 Application(s) Topical <User Schedule>  collagenase Ointment 1 Application(s) Topical two times a day  dexMEDEtomidine Infusion 0.2 MICROgram(s)/kG/Hr (1.92 mL/Hr) IV Continuous <Continuous>  dextrose 40% Gel 15 Gram(s) Oral once  dextrose 5%. 1000 milliLiter(s) (50 mL/Hr) IV Continuous <Continuous>  dextrose 5%. 1000 milliLiter(s) (100 mL/Hr) IV Continuous <Continuous>  dextrose 50% Injectable 25 Gram(s) IV Push once  dextrose 50% Injectable 12.5 Gram(s) IV Push once  dextrose 50% Injectable 25 Gram(s) IV Push once  fentaNYL   Infusion. 0.587 MICROgram(s)/kG/Hr (2.25 mL/Hr) IV Continuous <Continuous>  glucagon  Injectable 1 milliGRAM(s) IntraMuscular once  heparin   Injectable 5000 Unit(s) SubCutaneous every 8 hours  influenza  Vaccine (HIGH DOSE) 0.7 milliLiter(s) IntraMuscular once  insulin lispro (ADMELOG) corrective regimen sliding scale   SubCutaneous every 6 hours  lactulose Syrup 20 Gram(s) Oral two times a day  norepinephrine Infusion 0.05 MICROgram(s)/kG/Min (1.8 mL/Hr) IV Continuous <Continuous>  pantoprazole  Injectable 40 milliGRAM(s) IV Push daily  piperacillin/tazobactam IVPB.. 2.25 Gram(s) IV Intermittent every 6 hours  polyethylene glycol 3350 17 Gram(s) Oral every 12 hours  potassium chloride  20 mEq/100 mL IVPB 20 milliEquivalent(s) IV Intermittent every 2 hours  predniSONE   Tablet 60 milliGRAM(s) Oral daily  vitamin A &amp; D Ointment 1 Application(s) Topical daily    MEDICATIONS  (PRN):  lactulose Retention Enema 200 Gram(s) Rectal two times a day PRN constipation  morphine  - Injectable 2 milliGRAM(s) IV Push three times a day PRN Severe Pain (7 - 10)      LAB/STUDIES:                        7.5    18.45 )-----------( 298      ( 22 Feb 2022 04:20 )             22.4     02-22    133<L>  |  96<L>  |  16  ----------------------------<  150<H>  3.4<L>   |  25  |  <0.5<L>    Ca    6.8<L>      22 Feb 2022 04:20  Mg     1.7     02-22    TPro  4.2<L>  /  Alb  1.7<L>  /  TBili  0.9  /  DBili  x   /  AST  33  /  ALT  85<H>  /  AlkPhos  101  02-22      LIVER FUNCTIONS - ( 22 Feb 2022 04:20 )  Alb: 1.7 g/dL / Pro: 4.2 g/dL / ALK PHOS: 101 U/L / ALT: 85 U/L / AST: 33 U/L / GGT: x             IMAGING:  < from: Xray Chest 1 View- PORTABLE-Routine (Xray Chest 1 View- PORTABLE-Routine in AM.) (02.22.22 @ 06:36) >  Impression:    Patchy right-sided opacity similar to prior    < end of copied text >

## 2022-02-22 NOTE — CONSULT NOTE ADULT - ASSESSMENT
ASSESSMENT:  88yF w/ PMHx as above, here with acute hypoxic respiratory failure, unable to wean from vent. Physical exam findings, imaging, and labs as documented above.     PLAN:  - Continue Hep subq  - Try to wean off pressors  - Vent settings are adequate  - Please prepare for OR for tomorrow, 2/23 for trach and peg  - Consent still needs to be obtained    Above plan discussed with Attending Surgeon Dr. Andrzej Hernandez ,patient, patient family, and Primary team  02-22-22 @ 11:08

## 2022-02-22 NOTE — PROGRESS NOTE ADULT - ASSESSMENT
87 y/o F w/ PMH/o HTN (as per son at bedside) presenting to the hospital BIB after experiencing respiratory distress. According to the patients family, the patient was being fed when she began choking and had difficulty breathing. Pt was bag masked by EMS and transported to the hospital. According to the family, the patient began to experience diffuse skin reaction across her entire body as well as ulcerations in her mouth for the past month. The family denied noticing fever or chills.    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 According to the patients family, the patient was being fed when she began choking and had difficulty breathing. Pt was bag masked by EMS and transported to the hospital. According to the family, the patient began to experience diffuse skin reaction across her entire body as well as ulcerations in her mouth for the past month. The family denied noticing fever or chills.    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

## 2022-02-22 NOTE — PROGRESS NOTE ADULT - TIME BILLING
#Acute hypoxic resp failure; presumed due to aspiration; in setting of covid  s/p intubation 2/9  cxr with bl opacities  cta chest no pe; c/w aspiration  sputum cx with pseudomonas  va duplex neg  zosyn  prednisone as below  failed SAT  vent per icu  CTS eval for for trach, peg  #Elevated lactate  in setting of hypotension, on pressors  trend  hold on lasix for now  #Torsades de pointes  noted on 2/14, no further episodes  in setting of prolong qtc  tte with preserved ef; grade I diastolic dysfunction  qtc now 488  trend ekg  mg >2  monitor on tele  #Bullous pemphigoid  s/p skin bx 2/9  prednisone 60  f/u derm  #Transaminitis, hepatocellular predom  ruq with gb wall edema; suspect due to volume overload  lasix as above  trend    #Progress Note Handoff:  Pending (specify):  Consults_________, Tests________, Test Results_______, Other____intubated_____  Family discussion:  Disposition: Home___/SNF___/Other________/Unknown at this time____x____.

## 2022-02-22 NOTE — PROGRESS NOTE ADULT - SUBJECTIVE AND OBJECTIVE BOX
----------Daily Progress Note----------    HISTORY OF PRESENT ILLNESS:  Patient is a 88y old Female who presents with a chief complaint of hypoxia (19 Feb 2022 09:16)    Currently admitted to medicine with the primary diagnosis of Acute respiratory failure with hypoxia       Today is hospital day 13d.     INTERVAL HOSPITAL COURSE / OVERNIGHT EVENTS:    No overnight events. SAT today    Review of Systems: Otherwise unremarkable     <<<<<PAST MEDICAL & SURGICAL HISTORY>>>>>    ALLERGIES  No Known Allergies      Home Medications:        MEDICATIONS  STANDING MEDICATIONS  chlorhexidine 0.12% Liquid 15 milliLiter(s) Oral Mucosa every 12 hours  chlorhexidine 4% Liquid 1 Application(s) Topical <User Schedule>  collagenase Ointment 1 Application(s) Topical two times a day  dexMEDEtomidine Infusion 0.2 MICROgram(s)/kG/Hr IV Continuous <Continuous>  dextrose 40% Gel 15 Gram(s) Oral once  dextrose 5%. 1000 milliLiter(s) IV Continuous <Continuous>  dextrose 5%. 1000 milliLiter(s) IV Continuous <Continuous>  dextrose 50% Injectable 25 Gram(s) IV Push once  dextrose 50% Injectable 12.5 Gram(s) IV Push once  dextrose 50% Injectable 25 Gram(s) IV Push once  fentaNYL   Infusion. 0.587 MICROgram(s)/kG/Hr IV Continuous <Continuous>  glucagon  Injectable 1 milliGRAM(s) IntraMuscular once  heparin   Injectable 5000 Unit(s) SubCutaneous every 8 hours  influenza  Vaccine (HIGH DOSE) 0.7 milliLiter(s) IntraMuscular once  insulin lispro (ADMELOG) corrective regimen sliding scale   SubCutaneous every 6 hours  lactulose Syrup 20 Gram(s) Oral two times a day  norepinephrine Infusion 0.05 MICROgram(s)/kG/Min IV Continuous <Continuous>  pantoprazole  Injectable 40 milliGRAM(s) IV Push daily  piperacillin/tazobactam IVPB.. 2.25 Gram(s) IV Intermittent every 6 hours  polyethylene glycol 3350 17 Gram(s) Oral every 12 hours  potassium chloride  20 mEq/100 mL IVPB 20 milliEquivalent(s) IV Intermittent every 2 hours  predniSONE   Tablet 60 milliGRAM(s) Oral daily  vitamin A &amp; D Ointment 1 Application(s) Topical daily    PRN MEDICATIONS  lactulose Retention Enema 200 Gram(s) Rectal two times a day PRN  morphine  - Injectable 2 milliGRAM(s) IV Push three times a day PRN    VITALS:  T(F): 99.2  HR: 72  BP: 108/55  RR: 26  SpO2: 100%    <<<<<LABS>>>>>                        7.5    18.45 )-----------( 298      ( 22 Feb 2022 04:20 )             22.4     02-22    133<L>  |  96<L>  |  16  ----------------------------<  150<H>  3.4<L>   |  25  |  <0.5<L>    Ca    6.8<L>      22 Feb 2022 04:20  Mg     1.7     02-22    TPro  4.2<L>  /  Alb  1.7<L>  /  TBili  0.9  /  DBili  x   /  AST  33  /  ALT  85<H>  /  AlkPhos  101  02-22            [id number]        <<<<<RADIOLOGY>>>>>    < from: Xray Chest 1 View- PORTABLE-Routine (Xray Chest 1 View- PORTABLE-Routine in AM.) (02.21.22 @ 06:05) >  PROCEDURE DATE:  02/21/2022          INTERPRETATION:  Clinical History / Reason for exam: Line placement    Comparison : Chest radiograph February 20, 2022.    Technique/Positioning: Single AP view of the chest.    Findings:    Support devices: Endotracheal tube, feeding tube and right IJ central   venous catheter are unchanged.    Cardiac/mediastinum/hilum: Unchanged.    Lung parenchyma/Pleura: Bilateral opacities, unchanged. No pneumothorax.    Skeleton/soft tissues: Unchanged.    Impression:    Bilateral opacities, unchanged.    < end of copied text >      <<<<<PHYSICAL EXAM>>>>>  GENERAL: intubated   PULMONARY: Clear to auscultation bilaterally. Intubated  CARDIOVASCULAR: Regular rate and rhythm, S1-S2, no murmurs  GASTROINTESTINAL: Soft, non-tender, non-distended, no guarding.  SKIN/EXTREMITIES: bullae on hands b/l; b/l UE/LE wrapped  NEUROLOGIC: intubated       -----------------------------------------------------------------------------------------------------------------------------------------------------------------------------------------------

## 2022-02-22 NOTE — PRE-ANESTHESIA EVALUATION ADULT - NSANTHPMHFT_GEN_ALL_CORE
87 y/o F w/ PMH/o HTN (as per son at bedside) presenting to the hospital BIBA after experiencing respiratory distress. According to the patients family, the patient was being fed when she began choking and had difficulty breathing. Pt was bag masked by EMS and transported to the hospital. According to the family, the patient began to experience diffuse skin reaction across her entire body as well as ulcerations in her mouth for the past month. The family denied noticing fever or chills.    # Acute hypoxemic respiratory failure 2/2 aspiration  - Intubated on arrival  - Vent settings 12/300/5/30%   - DTA 2/9 negative  - Blood cx 2/8 negative     #Elevated D dimer  -F/U CTA and duplex  - D Dimer 2/16: 3,908    # Episode of torsades on 2/14  - QTc 2/14: 733  - Keep K >4, Mg>2  - Give calcium gluconate 1g q8 as per nephro  - Kphos 02/16  - Cardio following  - TTE 2/14: EF 60-65%    # Hypernatremia, resolved  # JILL  - Na >180 on presentation  - BUN/Cr 116/3.3 on presentation (baseline Cr 0.5)   - Trend BMP  - Goal decrease by 8-10 per 24hrs  - S/p Calcium gluconate as per nephro  - Na WNL now    # 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  - S/p Unasyn (2/9-2/15)  - Biopsy 2/9: bullous pemphigoid  - C/w prednisone (start 2/11)  - S/p doxycycline (2/12-2/15)  - Derm on board  - C/w zosyn as per ID (start 2/15)    # COVID-19  - COVID + on 2/15  - On prednisone 60 daily  - Ferritin 521, procal 1.14, ,   - ID following    DVT ppx: HSQ  Right radial A line 2/9 -> dc'ed 2/12  Left fem A line 2/13 -> dc'ed 2/17  R IJ TLC 2/14    TO FOLLOW UP:  #Electrolytes, keep Mag >2 and K >4 to prevent torsades  #Follow up with Derm for steroids  #Follow up with ID for abx  #Follow up with Pulm for extubation  #Keep R IJ in patient if possible, blood draws are very difficult due to patient's skin ulcerations  #F/U CTA and LE duplex

## 2022-02-22 NOTE — PROGRESS NOTE ADULT - ATTENDING COMMENTS
Agree with above  event of torsade de point secondary to prolonged QTC in setting of electrolytes imbalance , and medication induced.   2/15 repeated EKG showed significant improvement of QT/QTc interval   maintain electrolytes within normal ranges and avoid QT prolongation agents  Thank you for the consult and call cardio prn
as below
#Acute hypoxic resp failure  s/p intubation 2/9  cxr with bl opacities  check cta chest  va duplex  zosyn  SAT, SBT if able  vent per icu  #Torsades  noted on 2/14, no further episodes  in setting of prolong qtc  tte with preserved ef; grade I diastolic dysfunction  qtc now 488  trend ekg  mg >2  monitor on tele  #Bullous pemphigoid  s/p skin bx 2/9  prednisone 60  f/u derm  #Covid  appears incidental  prednisone as above  cta as above    #Progress Note Handoff:  Pending (specify):  Consults_________, Tests________, Test Results_______, Other____intubated_____  Family discussion:  Disposition: Home___/SNF___/Other________/Unknown at this time____x____
As above   Pt seen in ICU, Remains intubated on vent     Wounds- large pink wounds - torso and extremities as above   smaller wounds perioral and feet   Sacrum -pressure ulcer = dry black adherent eschar   dry healed areas     right long finger biopsy site - sutures intact - denuded skin -pink wound     large dressing change done     A/P   critical condition   Bullous pemphigoid vs p. vulgaris   continue wound care   Biopsy results pending   Derm for dz mgmt based on bx results and clinical condition
as below
as below

## 2022-02-22 NOTE — PROGRESS NOTE ADULT - SUBJECTIVE AND OBJECTIVE BOX
ARI MA  88y, Female    All available historical data reviewed    OVERNIGHT EVENTS:  fevers  fio2 30%  no diarrhea    ROS:  unable to obtain history secondary to patient's mental status and/or sedation     VITALS:  T(F): 97.9, Max: 101.1 (02-21-22 @ 16:00)  HR: 67  BP: 104/51  RR: 26Vital Signs Last 24 Hrs  T(C): 36.6 (22 Feb 2022 08:00), Max: 38.4 (21 Feb 2022 16:00)  T(F): 97.9 (22 Feb 2022 08:00), Max: 101.1 (21 Feb 2022 16:00)  HR: 67 (22 Feb 2022 10:15) (66 - 106)  BP: 104/51 (22 Feb 2022 10:15) (83/41 - 163/71)  BP(mean): 73 (22 Feb 2022 10:15) (59 - 102)  RR: 26 (22 Feb 2022 10:15) (18 - 35)  SpO2: 97% (22 Feb 2022 10:15) (94% - 100%)    TESTS & MEASUREMENTS:                        7.5    18.45 )-----------( 298      ( 22 Feb 2022 04:20 )             22.4     02-22    133<L>  |  96<L>  |  16  ----------------------------<  150<H>  3.4<L>   |  25  |  <0.5<L>    Ca    6.8<L>      22 Feb 2022 04:20  Mg     1.7     02-22    TPro  4.2<L>  /  Alb  1.7<L>  /  TBili  0.9  /  DBili  x   /  AST  33  /  ALT  85<H>  /  AlkPhos  101  02-22    LIVER FUNCTIONS - ( 22 Feb 2022 04:20 )  Alb: 1.7 g/dL / Pro: 4.2 g/dL / ALK PHOS: 101 U/L / ALT: 85 U/L / AST: 33 U/L / GGT: x                   RADIOLOGY & ADDITIONAL TESTS:  Personal review of radiological diagnostics performed  Echo and EKG results noted when applicable.     MEDICATIONS:  chlorhexidine 0.12% Liquid 15 milliLiter(s) Oral Mucosa every 12 hours  chlorhexidine 4% Liquid 1 Application(s) Topical <User Schedule>  collagenase Ointment 1 Application(s) Topical two times a day  dexMEDEtomidine Infusion 0.2 MICROgram(s)/kG/Hr IV Continuous <Continuous>  dextrose 40% Gel 15 Gram(s) Oral once  dextrose 5%. 1000 milliLiter(s) IV Continuous <Continuous>  dextrose 5%. 1000 milliLiter(s) IV Continuous <Continuous>  dextrose 50% Injectable 25 Gram(s) IV Push once  dextrose 50% Injectable 12.5 Gram(s) IV Push once  dextrose 50% Injectable 25 Gram(s) IV Push once  fentaNYL   Infusion. 0.587 MICROgram(s)/kG/Hr IV Continuous <Continuous>  glucagon  Injectable 1 milliGRAM(s) IntraMuscular once  heparin   Injectable 5000 Unit(s) SubCutaneous every 8 hours  influenza  Vaccine (HIGH DOSE) 0.7 milliLiter(s) IntraMuscular once  insulin lispro (ADMELOG) corrective regimen sliding scale   SubCutaneous every 6 hours  lactulose Retention Enema 200 Gram(s) Rectal two times a day PRN  lactulose Syrup 20 Gram(s) Oral two times a day  morphine  - Injectable 2 milliGRAM(s) IV Push three times a day PRN  norepinephrine Infusion 0.05 MICROgram(s)/kG/Min IV Continuous <Continuous>  pantoprazole  Injectable 40 milliGRAM(s) IV Push daily  piperacillin/tazobactam IVPB.. 2.25 Gram(s) IV Intermittent every 6 hours  polyethylene glycol 3350 17 Gram(s) Oral every 12 hours  potassium chloride  20 mEq/100 mL IVPB 20 milliEquivalent(s) IV Intermittent every 2 hours  predniSONE   Tablet 60 milliGRAM(s) Oral daily  vitamin A &amp; D Ointment 1 Application(s) Topical daily      ANTIBIOTICS:  piperacillin/tazobactam IVPB.. 2.25 Gram(s) IV Intermittent every 6 hours

## 2022-02-22 NOTE — PROGRESS NOTE ADULT - SUBJECTIVE AND OBJECTIVE BOX
Patient is a 88y old  Female who presents with a chief complaint of hypoxia (19 Feb 2022 09:16)        Over Night Events:  On MV.  Sedated.  On Levophed          ROS:     All ROS are negative except HPI         PHYSICAL EXAM    ICU Vital Signs Last 24 Hrs  T(C): 37.3 (22 Feb 2022 04:00), Max: 38.4 (21 Feb 2022 16:00)  T(F): 99.2 (22 Feb 2022 04:00), Max: 101.1 (21 Feb 2022 16:00)  HR: 82 (22 Feb 2022 06:00) (79 - 106)  BP: 109/55 (22 Feb 2022 06:00) (76/37 - 147/64)  BP(mean): 77 (22 Feb 2022 06:00) (49 - 94)  ABP: --  ABP(mean): --  RR: 27 (22 Feb 2022 06:00) (18 - 35)  SpO2: 98% (22 Feb 2022 06:00) (94% - 100%)      CONSTITUTIONAL:  Ill appearing in  NAD    ENT:   Airway patent,   Mouth with normal mucosa.   No thrush    EYES:   Pupils equal,   Round and reactive to light.    CARDIAC:   Normal rate,   Regular rhythm.     edema      Vascular:  Normal systolic impulse  No Carotid bruits    RESPIRATORY:   No wheezing  Bilateral BS  Normal chest expansion  Not tachypneic,  No use of accessory muscles    GASTROINTESTINAL:  Abdomen soft,   Non-tender,   No guarding,   + BS    MUSCULOSKELETAL:   Range of motion is not limited,  No clubbing, cyanosis    NEUROLOGICAL:   Sedated     SKIN:   Skin normal color for race,     PSYCHIATRIC:   Sedated   No apparent risk to self or others.    HEMATOLOGICAL:  No cervical  lymphadenopathy.  no inguinal lymphadenopathy      02-21-22 @ 07:01  -  02-22-22 @ 07:00  --------------------------------------------------------  IN:    Dexmedetomidine: 102 mL    FentaNYL: 5.7 mL    Free Water: 1250 mL    Glucerna 1.5: 665 mL    Norepinephrine: 111.5 mL  Total IN: 2134.2 mL    OUT:    Indwelling Catheter - Urethral (mL): 1670 mL  Total OUT: 1670 mL    Total NET: 464.2 mL          LABS:                            7.5    18.45 )-----------( 298      ( 22 Feb 2022 04:20 )             22.4                                               02-22    133<L>  |  96<L>  |  16  ----------------------------<  150<H>  3.4<L>   |  25  |  <0.5<L>    Ca    6.8<L>      22 Feb 2022 04:20  Mg     1.7     02-22    TPro  4.2<L>  /  Alb  1.7<L>  /  TBili  0.9  /  DBili  x   /  AST  33  /  ALT  85<H>  /  AlkPhos  101  02-22                                                                                           LIVER FUNCTIONS - ( 22 Feb 2022 04:20 )  Alb: 1.7 g/dL / Pro: 4.2 g/dL / ALK PHOS: 101 U/L / ALT: 85 U/L / AST: 33 U/L / GGT: x                                                                                               Mode: AC/ CMV (Assist Control/ Continuous Mandatory Ventilation)  RR (machine): 12  TV (machine): 300  FiO2: 30  PEEP: 5  ITime: 1  MAP: 8  PIP: 19                                          MEDICATIONS  (STANDING):  chlorhexidine 0.12% Liquid 15 milliLiter(s) Oral Mucosa every 12 hours  chlorhexidine 4% Liquid 1 Application(s) Topical <User Schedule>  collagenase Ointment 1 Application(s) Topical two times a day  dexMEDEtomidine Infusion 0.2 MICROgram(s)/kG/Hr (1.92 mL/Hr) IV Continuous <Continuous>  dextrose 40% Gel 15 Gram(s) Oral once  dextrose 5%. 1000 milliLiter(s) (50 mL/Hr) IV Continuous <Continuous>  dextrose 5%. 1000 milliLiter(s) (100 mL/Hr) IV Continuous <Continuous>  dextrose 50% Injectable 25 Gram(s) IV Push once  dextrose 50% Injectable 12.5 Gram(s) IV Push once  dextrose 50% Injectable 25 Gram(s) IV Push once  fentaNYL   Infusion. 0.587 MICROgram(s)/kG/Hr (2.25 mL/Hr) IV Continuous <Continuous>  glucagon  Injectable 1 milliGRAM(s) IntraMuscular once  heparin   Injectable 5000 Unit(s) SubCutaneous every 8 hours  influenza  Vaccine (HIGH DOSE) 0.7 milliLiter(s) IntraMuscular once  insulin lispro (ADMELOG) corrective regimen sliding scale   SubCutaneous every 6 hours  lactulose Syrup 20 Gram(s) Oral two times a day  magnesium sulfate  IVPB 2 Gram(s) IV Intermittent once  norepinephrine Infusion 0.05 MICROgram(s)/kG/Min (1.8 mL/Hr) IV Continuous <Continuous>  pantoprazole  Injectable 40 milliGRAM(s) IV Push daily  piperacillin/tazobactam IVPB.. 2.25 Gram(s) IV Intermittent every 6 hours  polyethylene glycol 3350 17 Gram(s) Oral every 12 hours  potassium chloride  20 mEq/100 mL IVPB 20 milliEquivalent(s) IV Intermittent every 2 hours  predniSONE   Tablet 60 milliGRAM(s) Oral daily  vitamin A &amp; D Ointment 1 Application(s) Topical daily    MEDICATIONS  (PRN):  lactulose Retention Enema 200 Gram(s) Rectal two times a day PRN constipation  morphine  - Injectable 2 milliGRAM(s) IV Push three times a day PRN Severe Pain (7 - 10)      New X-rays reviewed:                                                                                  ECHO    CXR interpreted by me:  ET OG OK>  Bilateral infiltrates

## 2022-02-23 LAB
ALBUMIN SERPL ELPH-MCNC: 1.9 G/DL — LOW (ref 3.5–5.2)
ALP SERPL-CCNC: 130 U/L — HIGH (ref 30–115)
ALT FLD-CCNC: 83 U/L — HIGH (ref 0–41)
ANION GAP SERPL CALC-SCNC: 10 MMOL/L — SIGNIFICANT CHANGE UP (ref 7–14)
AST SERPL-CCNC: 31 U/L — SIGNIFICANT CHANGE UP (ref 0–41)
BASOPHILS # BLD AUTO: 0.02 K/UL — SIGNIFICANT CHANGE UP (ref 0–0.2)
BASOPHILS NFR BLD AUTO: 0.1 % — SIGNIFICANT CHANGE UP (ref 0–1)
BILIRUB SERPL-MCNC: 1.3 MG/DL — HIGH (ref 0.2–1.2)
BUN SERPL-MCNC: 19 MG/DL — SIGNIFICANT CHANGE UP (ref 10–20)
CALCIUM SERPL-MCNC: 7 MG/DL — LOW (ref 8.5–10.1)
CHLORIDE SERPL-SCNC: 97 MMOL/L — LOW (ref 98–110)
CO2 SERPL-SCNC: 26 MMOL/L — SIGNIFICANT CHANGE UP (ref 17–32)
CREAT SERPL-MCNC: <0.5 MG/DL — LOW (ref 0.7–1.5)
EOSINOPHIL # BLD AUTO: 0.01 K/UL — SIGNIFICANT CHANGE UP (ref 0–0.7)
EOSINOPHIL NFR BLD AUTO: 0.1 % — SIGNIFICANT CHANGE UP (ref 0–8)
GAS PNL BLDA: SIGNIFICANT CHANGE UP
GLUCOSE BLDC GLUCOMTR-MCNC: 110 MG/DL — HIGH (ref 70–99)
GLUCOSE BLDC GLUCOMTR-MCNC: 122 MG/DL — HIGH (ref 70–99)
GLUCOSE BLDC GLUCOMTR-MCNC: 132 MG/DL — HIGH (ref 70–99)
GLUCOSE BLDC GLUCOMTR-MCNC: 154 MG/DL — HIGH (ref 70–99)
GLUCOSE BLDC GLUCOMTR-MCNC: 155 MG/DL — HIGH (ref 70–99)
GLUCOSE SERPL-MCNC: 109 MG/DL — HIGH (ref 70–99)
HCT VFR BLD CALC: 24.3 % — LOW (ref 37–47)
HGB BLD-MCNC: 8.1 G/DL — LOW (ref 12–16)
IMM GRANULOCYTES NFR BLD AUTO: 2 % — HIGH (ref 0.1–0.3)
LYMPHOCYTES # BLD AUTO: 0.48 K/UL — LOW (ref 1.2–3.4)
LYMPHOCYTES # BLD AUTO: 2.9 % — LOW (ref 20.5–51.1)
MAGNESIUM SERPL-MCNC: 2.1 MG/DL — SIGNIFICANT CHANGE UP (ref 1.8–2.4)
MCHC RBC-ENTMCNC: 30.1 PG — SIGNIFICANT CHANGE UP (ref 27–31)
MCHC RBC-ENTMCNC: 33.3 G/DL — SIGNIFICANT CHANGE UP (ref 32–37)
MCV RBC AUTO: 90.3 FL — SIGNIFICANT CHANGE UP (ref 81–99)
MONOCYTES # BLD AUTO: 0.6 K/UL — SIGNIFICANT CHANGE UP (ref 0.1–0.6)
MONOCYTES NFR BLD AUTO: 3.7 % — SIGNIFICANT CHANGE UP (ref 1.7–9.3)
NEUTROPHILS # BLD AUTO: 14.85 K/UL — HIGH (ref 1.4–6.5)
NEUTROPHILS NFR BLD AUTO: 91.2 % — HIGH (ref 42.2–75.2)
NRBC # BLD: 0 /100 WBCS — SIGNIFICANT CHANGE UP (ref 0–0)
PLATELET # BLD AUTO: 326 K/UL — SIGNIFICANT CHANGE UP (ref 130–400)
POTASSIUM SERPL-MCNC: 4 MMOL/L — SIGNIFICANT CHANGE UP (ref 3.5–5)
POTASSIUM SERPL-SCNC: 4 MMOL/L — SIGNIFICANT CHANGE UP (ref 3.5–5)
PROT SERPL-MCNC: 4.7 G/DL — LOW (ref 6–8)
RBC # BLD: 2.69 M/UL — LOW (ref 4.2–5.4)
RBC # FLD: 18.6 % — HIGH (ref 11.5–14.5)
SODIUM SERPL-SCNC: 133 MMOL/L — LOW (ref 135–146)
WBC # BLD: 16.28 K/UL — HIGH (ref 4.8–10.8)
WBC # FLD AUTO: 16.28 K/UL — HIGH (ref 4.8–10.8)

## 2022-02-23 PROCEDURE — 99233 SBSQ HOSP IP/OBS HIGH 50: CPT

## 2022-02-23 PROCEDURE — 71045 X-RAY EXAM CHEST 1 VIEW: CPT | Mod: 26

## 2022-02-23 PROCEDURE — 99232 SBSQ HOSP IP/OBS MODERATE 35: CPT

## 2022-02-23 PROCEDURE — 43246 EGD PLACE GASTROSTOMY TUBE: CPT

## 2022-02-23 PROCEDURE — 93010 ELECTROCARDIOGRAM REPORT: CPT

## 2022-02-23 PROCEDURE — 31600 PLANNED TRACHEOSTOMY: CPT

## 2022-02-23 RX ORDER — ENOXAPARIN SODIUM 100 MG/ML
40 INJECTION SUBCUTANEOUS DAILY
Refills: 0 | Status: DISCONTINUED | OUTPATIENT
Start: 2022-02-24 | End: 2022-03-01

## 2022-02-23 RX ORDER — HEPARIN SODIUM 5000 [USP'U]/ML
5000 INJECTION INTRAVENOUS; SUBCUTANEOUS EVERY 8 HOURS
Refills: 0 | Status: COMPLETED | OUTPATIENT
Start: 2022-02-23 | End: 2022-02-23

## 2022-02-23 RX ORDER — DEXAMETHASONE 0.5 MG/5ML
6 ELIXIR ORAL DAILY
Refills: 0 | Status: DISCONTINUED | OUTPATIENT
Start: 2022-02-23 | End: 2022-02-24

## 2022-02-23 RX ADMIN — Medication 1 APPLICATION(S): at 06:25

## 2022-02-23 RX ADMIN — Medication 1 APPLICATION(S): at 11:59

## 2022-02-23 RX ADMIN — CHLORHEXIDINE GLUCONATE 15 MILLILITER(S): 213 SOLUTION TOPICAL at 17:44

## 2022-02-23 RX ADMIN — Medication 1 APPLICATION(S): at 17:44

## 2022-02-23 RX ADMIN — DEXMEDETOMIDINE HYDROCHLORIDE IN 0.9% SODIUM CHLORIDE 1.92 MICROGRAM(S)/KG/HR: 4 INJECTION INTRAVENOUS at 06:13

## 2022-02-23 RX ADMIN — PIPERACILLIN AND TAZOBACTAM 200 GRAM(S): 4; .5 INJECTION, POWDER, LYOPHILIZED, FOR SOLUTION INTRAVENOUS at 06:13

## 2022-02-23 RX ADMIN — DEXMEDETOMIDINE HYDROCHLORIDE IN 0.9% SODIUM CHLORIDE 1.92 MICROGRAM(S)/KG/HR: 4 INJECTION INTRAVENOUS at 21:28

## 2022-02-23 RX ADMIN — MORPHINE SULFATE 2 MILLIGRAM(S): 50 CAPSULE, EXTENDED RELEASE ORAL at 13:00

## 2022-02-23 RX ADMIN — Medication 1: at 23:19

## 2022-02-23 RX ADMIN — HEPARIN SODIUM 5000 UNIT(S): 5000 INJECTION INTRAVENOUS; SUBCUTANEOUS at 21:28

## 2022-02-23 RX ADMIN — MORPHINE SULFATE 2 MILLIGRAM(S): 50 CAPSULE, EXTENDED RELEASE ORAL at 12:05

## 2022-02-23 RX ADMIN — CHLORHEXIDINE GLUCONATE 15 MILLILITER(S): 213 SOLUTION TOPICAL at 06:16

## 2022-02-23 RX ADMIN — PANTOPRAZOLE SODIUM 40 MILLIGRAM(S): 20 TABLET, DELAYED RELEASE ORAL at 12:00

## 2022-02-23 RX ADMIN — PIPERACILLIN AND TAZOBACTAM 200 GRAM(S): 4; .5 INJECTION, POWDER, LYOPHILIZED, FOR SOLUTION INTRAVENOUS at 00:32

## 2022-02-23 RX ADMIN — Medication 60 MILLIGRAM(S): at 06:17

## 2022-02-23 RX ADMIN — HEPARIN SODIUM 5000 UNIT(S): 5000 INJECTION INTRAVENOUS; SUBCUTANEOUS at 06:15

## 2022-02-23 RX ADMIN — PIPERACILLIN AND TAZOBACTAM 200 GRAM(S): 4; .5 INJECTION, POWDER, LYOPHILIZED, FOR SOLUTION INTRAVENOUS at 23:18

## 2022-02-23 RX ADMIN — CHLORHEXIDINE GLUCONATE 1 APPLICATION(S): 213 SOLUTION TOPICAL at 06:14

## 2022-02-23 RX ADMIN — PIPERACILLIN AND TAZOBACTAM 200 GRAM(S): 4; .5 INJECTION, POWDER, LYOPHILIZED, FOR SOLUTION INTRAVENOUS at 17:45

## 2022-02-23 RX ADMIN — Medication 40 MILLIGRAM(S): at 11:59

## 2022-02-23 RX ADMIN — PIPERACILLIN AND TAZOBACTAM 200 GRAM(S): 4; .5 INJECTION, POWDER, LYOPHILIZED, FOR SOLUTION INTRAVENOUS at 12:01

## 2022-02-23 NOTE — BRIEF OPERATIVE NOTE - OPERATION/FINDINGS
Tracheostomy and placement of 6CN75H trach tube.  Percutaneous endoscopic gastrojejunostomy 18Fr feeding tube placement.  Tube hubbed at 3cm at skin.  G-port connected to gravity

## 2022-02-23 NOTE — BRIEF OPERATIVE NOTE - NSICDXBRIEFPROCEDURE_GEN_ALL_CORE_FT
PROCEDURES:  Open tracheostomy 23-Feb-2022 19:49:39  Mason Hart  EGD, with PEG 23-Feb-2022 19:49:48  Mason Hart

## 2022-02-23 NOTE — PROGRESS NOTE ADULT - SUBJECTIVE AND OBJECTIVE BOX
ARI MA  88y, Female    All available historical data reviewed    OVERNIGHT EVENTS:  no fevers  fio2 30%  off pressors  non responsive  no BM    ROS:  unable to obtain history secondary to patient's mental status and/or sedation     VITALS:  T(F): 97.7, Max: 99.6 (02-23-22 @ 04:00)  HR: 80  BP: 127/60  RR: 27Vital Signs Last 24 Hrs  T(C): 36.5 (23 Feb 2022 08:00), Max: 37.6 (23 Feb 2022 04:00)  T(F): 97.7 (23 Feb 2022 08:00), Max: 99.6 (23 Feb 2022 04:00)  HR: 80 (23 Feb 2022 09:00) (54 - 91)  BP: 127/60 (23 Feb 2022 09:00) (94/44 - 220/100)  BP(mean): 86 (23 Feb 2022 09:00) (64 - 143)  RR: 27 (23 Feb 2022 09:00) (18 - 36)  SpO2: 97% (23 Feb 2022 09:00) (90% - 100%)    TESTS & MEASUREMENTS:                        8.1    16.28 )-----------( 326      ( 23 Feb 2022 04:35 )             24.3     02-23    133<L>  |  97<L>  |  19  ----------------------------<  109<H>  4.0   |  26  |  <0.5<L>    Ca    7.0<L>      23 Feb 2022 04:35  Mg     2.1     02-23    TPro  4.7<L>  /  Alb  1.9<L>  /  TBili  1.3<H>  /  DBili  x   /  AST  31  /  ALT  83<H>  /  AlkPhos  130<H>  02-23    LIVER FUNCTIONS - ( 23 Feb 2022 04:35 )  Alb: 1.9 g/dL / Pro: 4.7 g/dL / ALK PHOS: 130 U/L / ALT: 83 U/L / AST: 31 U/L / GGT: x                   RADIOLOGY & ADDITIONAL TESTS:  Personal review of radiological diagnostics performed  Echo and EKG results noted when applicable.     MEDICATIONS:  chlorhexidine 0.12% Liquid 15 milliLiter(s) Oral Mucosa every 12 hours  chlorhexidine 4% Liquid 1 Application(s) Topical <User Schedule>  collagenase Ointment 1 Application(s) Topical two times a day  dexMEDEtomidine Infusion 0.2 MICROgram(s)/kG/Hr IV Continuous <Continuous>  dextrose 40% Gel 15 Gram(s) Oral once  dextrose 5%. 1000 milliLiter(s) IV Continuous <Continuous>  dextrose 5%. 1000 milliLiter(s) IV Continuous <Continuous>  dextrose 50% Injectable 25 Gram(s) IV Push once  dextrose 50% Injectable 12.5 Gram(s) IV Push once  dextrose 50% Injectable 25 Gram(s) IV Push once  fentaNYL   Infusion. 0.587 MICROgram(s)/kG/Hr IV Continuous <Continuous>  glucagon  Injectable 1 milliGRAM(s) IntraMuscular once  heparin   Injectable 5000 Unit(s) SubCutaneous every 8 hours  influenza  Vaccine (HIGH DOSE) 0.7 milliLiter(s) IntraMuscular once  insulin lispro (ADMELOG) corrective regimen sliding scale   SubCutaneous every 6 hours  lactulose Retention Enema 200 Gram(s) Rectal two times a day PRN  lactulose Syrup 20 Gram(s) Oral two times a day  morphine  - Injectable 2 milliGRAM(s) IV Push three times a day PRN  norepinephrine Infusion 0.05 MICROgram(s)/kG/Min IV Continuous <Continuous>  pantoprazole  Injectable 40 milliGRAM(s) IV Push daily  piperacillin/tazobactam IVPB.. 2.25 Gram(s) IV Intermittent every 6 hours  polyethylene glycol 3350 17 Gram(s) Oral every 12 hours  predniSONE   Tablet 40 milliGRAM(s) Oral daily  vitamin A &amp; D Ointment 1 Application(s) Topical daily      ANTIBIOTICS:  piperacillin/tazobactam IVPB.. 2.25 Gram(s) IV Intermittent every 6 hours

## 2022-02-23 NOTE — PROGRESS NOTE ADULT - ASSESSMENT
89 y/o F w/ PMH/o HTN (as per son at bedside) presenting to the hospital BIBA after experiencing respiratory distress. According to the patients family, the patient was being fed when she began choking and had difficulty breathing. Pt was bag masked by EMS and transported to the hospital. According to the family, the patient began to experience diffuse skin reaction across her entire body as well as ulcerations in her mouth for the past month. The family denied noticing fever or chills.    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 According to the patients family, the patient was being fed when she began choking and had difficulty breathing. Pt was bag masked by EMS and transported to the hospital. According to the family, the patient began to experience diffuse skin reaction across her entire body as well as ulcerations in her mouth for the past month. The family denied noticing fever or chills.    # Acute hypoxemic respiratory failure 2/2 aspiration pna  - Intubated on arrival  -failed SAT 2/22  -CT surgery consulted for trach/PEG placement, preopped for trach/PEG  - DTA 2/9 negative  - blood cultures negative  - ABGs reviewed by pulm team and in rounds.         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

## 2022-02-23 NOTE — PROVIDER CONTACT NOTE (OTHER) - DATE AND TIME:
18-Feb-2022 01:40
18-Feb-2022 19:41
18-Feb-2022 22:45
23-Feb-2022 20:35
18-Feb-2022 20:15
19-Feb-2022 00:05

## 2022-02-23 NOTE — PROVIDER CONTACT NOTE (OTHER) - NAME OF MD/NP/PA/DO NOTIFIED:
MD Ilia Scott [phone]
MD Ilia Scott [phone]
Quentin Mendoza [phone]
MD Ilia [phone]
MD Merced Maldonado [phone]
MD Mcmahan [phone]

## 2022-02-23 NOTE — PROVIDER CONTACT NOTE (OTHER) - REASON
pt hypertensive after dressing changes & appears in pain
Patient hypertensive
Pt hypertensive
Pt bradycardiac
Pt vomited tube feeds via endotracheal tube
Pt hypertensive

## 2022-02-23 NOTE — PROGRESS NOTE ADULT - SUBJECTIVE AND OBJECTIVE BOX
ARI MA  88y Female   [id number]    Hospital Day: 15  Post Operative Day:  Procedure:  Patient is a 88y old  Female who presents with a chief complaint of hypoxia (19 Feb 2022 09:16)    PAST MEDICAL & SURGICAL HISTORY:      Events of the Last 24h:  Vital Signs Last 24 Hrs  T(C): 36.2 (22 Feb 2022 20:00), Max: 37.3 (22 Feb 2022 04:00)  T(F): 97.1 (22 Feb 2022 20:00), Max: 99.2 (22 Feb 2022 04:00)  HR: 89 (23 Feb 2022 02:00) (54 - 90)  BP: 168/67 (23 Feb 2022 02:00) (88/46 - 220/100)  BP(mean): 97 (23 Feb 2022 02:00) (63 - 143)  RR: 31 (23 Feb 2022 02:00) (18 - 35)  SpO2: 98% (23 Feb 2022 02:00) (90% - 100%)    Mode: AC/ CMV (Assist Control/ Continuous Mandatory Ventilation), RR (machine): 12, TV (machine): 300, FiO2: 30, PEEP: 5, ITime: 1, MAP: 9, PIP: 20    Diet, NPO after Midnight:      NPO Start Date: 22-Feb-2022,   NPO Start Time: 23:59  Except Medications (02-22-22 @ 11:25)  Diet, NPO with Tube Feed:   Tube Feeding Modality: Orogastric  Glucerna 1.2 Reynold  Total Volume for 24 Hours (mL): 840  Continuous  Until Goal Tube Feed Rate (mL per Hour): 35  Tube Feed Duration (in Hours): 24  Tube Feed Start Time: 10:00  Bolus   Total Volume per Flush (mL): 50   Frequency: Every 4 Hours  Free Water Flush Instructions:  3 Beneprotein at once at noon daily  Beneprotein ([facility] Only)     Qty per Day:  3 (02-12-22 @ 10:00)      I&O's Summary    21 Feb 2022 07:01  -  22 Feb 2022 07:00  --------------------------------------------------------  IN: 2169.2 mL / OUT: 1745 mL / NET: 424.2 mL    22 Feb 2022 07:01  -  23 Feb 2022 02:50  --------------------------------------------------------  IN: 1338.9 mL / OUT: 1065 mL / NET: 273.9 mL     I&O's Detail    21 Feb 2022 07:01  -  22 Feb 2022 07:00  --------------------------------------------------------  IN:    Dexmedetomidine: 102 mL    FentaNYL: 5.7 mL    Free Water: 1250 mL    Glucerna 1.5: 700 mL    Norepinephrine: 111.5 mL  Total IN: 2169.2 mL    OUT:    Indwelling Catheter - Urethral (mL): 1745 mL  Total OUT: 1745 mL    Total NET: 424.2 mL      22 Feb 2022 07:01  -  23 Feb 2022 02:50  --------------------------------------------------------  IN:    Dexmedetomidine: 56.9 mL    Free Water: 750 mL    Glucerna 1.5: 525 mL    Norepinephrine: 7 mL  Total IN: 1338.9 mL    OUT:    Indwelling Catheter - Urethral (mL): 1065 mL  Total OUT: 1065 mL    Total NET: 273.9 mL          MEDICATIONS  (STANDING):  chlorhexidine 0.12% Liquid 15 milliLiter(s) Oral Mucosa every 12 hours  chlorhexidine 4% Liquid 1 Application(s) Topical <User Schedule>  collagenase Ointment 1 Application(s) Topical two times a day  dexMEDEtomidine Infusion 0.2 MICROgram(s)/kG/Hr (1.92 mL/Hr) IV Continuous <Continuous>  dextrose 40% Gel 15 Gram(s) Oral once  dextrose 5%. 1000 milliLiter(s) (100 mL/Hr) IV Continuous <Continuous>  dextrose 5%. 1000 milliLiter(s) (50 mL/Hr) IV Continuous <Continuous>  dextrose 50% Injectable 25 Gram(s) IV Push once  dextrose 50% Injectable 12.5 Gram(s) IV Push once  dextrose 50% Injectable 25 Gram(s) IV Push once  fentaNYL   Infusion. 0.587 MICROgram(s)/kG/Hr (2.25 mL/Hr) IV Continuous <Continuous>  glucagon  Injectable 1 milliGRAM(s) IntraMuscular once  heparin   Injectable 5000 Unit(s) SubCutaneous every 8 hours  influenza  Vaccine (HIGH DOSE) 0.7 milliLiter(s) IntraMuscular once  insulin lispro (ADMELOG) corrective regimen sliding scale   SubCutaneous every 6 hours  lactulose Syrup 20 Gram(s) Oral two times a day  norepinephrine Infusion 0.05 MICROgram(s)/kG/Min (1.8 mL/Hr) IV Continuous <Continuous>  pantoprazole  Injectable 40 milliGRAM(s) IV Push daily  piperacillin/tazobactam IVPB.. 2.25 Gram(s) IV Intermittent every 6 hours  polyethylene glycol 3350 17 Gram(s) Oral every 12 hours  predniSONE   Tablet 60 milliGRAM(s) Oral daily  vitamin A &amp; D Ointment 1 Application(s) Topical daily    MEDICATIONS  (PRN):  lactulose Retention Enema 200 Gram(s) Rectal two times a day PRN constipation  morphine  - Injectable 2 milliGRAM(s) IV Push three times a day PRN Severe Pain (7 - 10)      PHYSICAL EXAM:    GENERAL: NAD    HEENT: NCAT    CHEST/LUNGS: CTAB    HEART: RRR,  No murmurs, rubs, or gallops    ABDOMEN: SNTND +BS    EXTREMITIES:  FROM, No clubbing, cyanosis, or edema, palpable pulse    NEURO: No focal neurological deficits    SKIN: No rashes or lesions    INCISION/WOUNDS:                          7.5    18.45 )-----------( 298      ( 22 Feb 2022 04:20 )             22.4        CBC Full  -  ( 22 Feb 2022 04:20 )  WBC Count : 18.45 K/uL  RBC Count : 2.46 M/uL  Hemoglobin : 7.5 g/dL  Hematocrit : 22.4 %  Platelet Count - Automated : 298 K/uL  Mean Cell Volume : 91.1 fL  Mean Cell Hemoglobin : 30.5 pg  Mean Cell Hemoglobin Concentration : 33.5 g/dL  Auto Neutrophil # : 17.08 K/uL  Auto Lymphocyte # : 0.67 K/uL  Auto Monocyte # : 0.51 K/uL  Auto Eosinophil # : 0.00 K/uL  Auto Basophil # : 0.02 K/uL  Auto Neutrophil % : 92.6 %  Auto Lymphocyte % : 3.6 %  Auto Monocyte % : 2.8 %  Auto Eosinophil % : 0.0 %  Auto Basophil % : 0.1 %               132   |  96    |  19                 Ca: 6.9    BMP:   ----------------------------< 161    Mg: x     (02-22-22 @ 19:30)             4.6    |  25    | <0.5               Ph: x        LFT:     TPro: 4.2 / Alb: 1.7 / TBili: 0.9 / DBili: x / AST: 33 / ALT: 85 / AlkPhos: 101   (02-22-22 @ 04:20)    LIVER FUNCTIONS - ( 22 Feb 2022 04:20 )  Alb: 1.7 g/dL / Pro: 4.2 g/dL / ALK PHOS: 101 U/L / ALT: 85 U/L / AST: 33 U/L / GGT: x           PT/INR - ( 22 Feb 2022 19:30 )   PT: 14.10 sec;   INR: 1.23 ratio         PTT - ( 22 Feb 2022 19:30 )  PTT:30.0 sec

## 2022-02-23 NOTE — PROVIDER CONTACT NOTE (OTHER) - RECOMMENDATIONS
MD assessment, increase fentanyl drip
one time dose of dilaudid 1mg IVP for breakthrough pain
MD assessment, management of BP with medication
tube feeds were suctioned out of endotracheal tubing. Tube feeds were held and MD Mcmahan was called to the bedside
MD assessment, medication for BP management
MD assessment, decrease rate of precedex and continue to monitor patient

## 2022-02-23 NOTE — PROGRESS NOTE ADULT - SUBJECTIVE AND OBJECTIVE BOX
Patient is a 88y old  Female who presents with a chief complaint of hypoxia (19 Feb 2022 09:16)        Over Night Events:  Remains on MV.  Sedated.  Off pressors.          ROS:     All ROS are negative except HPI         PHYSICAL EXAM    ICU Vital Signs Last 24 Hrs  T(C): 37.6 (23 Feb 2022 04:00), Max: 37.6 (23 Feb 2022 04:00)  T(F): 99.6 (23 Feb 2022 04:00), Max: 99.6 (23 Feb 2022 04:00)  HR: 82 (23 Feb 2022 06:00) (54 - 91)  BP: 137/63 (23 Feb 2022 06:00) (88/46 - 220/100)  BP(mean): 90 (23 Feb 2022 06:00) (63 - 143)  ABP: --  ABP(mean): --  RR: 28 (23 Feb 2022 06:00) (18 - 36)  SpO2: 97% (23 Feb 2022 06:00) (90% - 100%)      CONSTITUTIONAL:  Ill appearing in  NAD    ENT:   Airway patent,   Mouth with normal mucosa.   No thrush    EYES:   Pupils equal,   Round and reactive to light.    CARDIAC:   Normal rate,   Regular rhythm.    edema      Vascular:  Normal systolic impulse  No Carotid bruits    RESPIRATORY:   No wheezing  Bilateral BS  Normal chest expansion  Not tachypneic,  No use of accessory muscles    GASTROINTESTINAL:  Abdomen soft,   Non-tender,   No guarding,   + BS    MUSCULOSKELETAL:   Range of motion is not limited,  No clubbing, cyanosis    NEUROLOGICAL:   Sedated     SKIN:   Skin normal color for race,   No evidence of rash.    PSYCHIATRIC:   Sedated   No apparent risk to self or others.    HEMATOLOGICAL:  No cervical  lymphadenopathy.  no inguinal lymphadenopathy      02-22-22 @ 07:01  -  02-23-22 @ 07:00  --------------------------------------------------------  IN:    Dexmedetomidine: 68 mL    Free Water: 750 mL    Glucerna 1.5: 525 mL    Norepinephrine: 7 mL  Total IN: 1350 mL    OUT:    Indwelling Catheter - Urethral (mL): 1130 mL  Total OUT: 1130 mL    Total NET: 220 mL          LABS:                            8.1    16.28 )-----------( 326      ( 23 Feb 2022 04:35 )             24.3                                               02-23    133<L>  |  97<L>  |  19  ----------------------------<  109<H>  4.0   |  26  |  <0.5<L>    Ca    7.0<L>      23 Feb 2022 04:35  Mg     2.1     02-23    TPro  4.7<L>  /  Alb  1.9<L>  /  TBili  1.3<H>  /  DBili  x   /  AST  31  /  ALT  83<H>  /  AlkPhos  130<H>  02-23      PT/INR - ( 22 Feb 2022 19:30 )   PT: 14.10 sec;   INR: 1.23 ratio         PTT - ( 22 Feb 2022 19:30 )  PTT:30.0 sec                                                                                     LIVER FUNCTIONS - ( 23 Feb 2022 04:35 )  Alb: 1.9 g/dL / Pro: 4.7 g/dL / ALK PHOS: 130 U/L / ALT: 83 U/L / AST: 31 U/L / GGT: x                                                                                               Mode: AC/ CMV (Assist Control/ Continuous Mandatory Ventilation)  RR (machine): 12  TV (machine): 300  FiO2: 30  PEEP: 5  ITime: 1  MAP: 9  PIP: 20                                      ABG - ( 23 Feb 2022 03:40 )  pH, Arterial: 7.57  pH, Blood: x     /  pCO2: 30    /  pO2: 97    / HCO3: 28    / Base Excess: 5.4   /  SaO2: 98.4                MEDICATIONS  (STANDING):  chlorhexidine 0.12% Liquid 15 milliLiter(s) Oral Mucosa every 12 hours  chlorhexidine 4% Liquid 1 Application(s) Topical <User Schedule>  collagenase Ointment 1 Application(s) Topical two times a day  dexMEDEtomidine Infusion 0.2 MICROgram(s)/kG/Hr (1.92 mL/Hr) IV Continuous <Continuous>  dextrose 40% Gel 15 Gram(s) Oral once  dextrose 5%. 1000 milliLiter(s) (100 mL/Hr) IV Continuous <Continuous>  dextrose 5%. 1000 milliLiter(s) (50 mL/Hr) IV Continuous <Continuous>  dextrose 50% Injectable 25 Gram(s) IV Push once  dextrose 50% Injectable 12.5 Gram(s) IV Push once  dextrose 50% Injectable 25 Gram(s) IV Push once  fentaNYL   Infusion. 0.587 MICROgram(s)/kG/Hr (2.25 mL/Hr) IV Continuous <Continuous>  glucagon  Injectable 1 milliGRAM(s) IntraMuscular once  heparin   Injectable 5000 Unit(s) SubCutaneous every 8 hours  influenza  Vaccine (HIGH DOSE) 0.7 milliLiter(s) IntraMuscular once  insulin lispro (ADMELOG) corrective regimen sliding scale   SubCutaneous every 6 hours  lactulose Syrup 20 Gram(s) Oral two times a day  norepinephrine Infusion 0.05 MICROgram(s)/kG/Min (1.8 mL/Hr) IV Continuous <Continuous>  pantoprazole  Injectable 40 milliGRAM(s) IV Push daily  piperacillin/tazobactam IVPB.. 2.25 Gram(s) IV Intermittent every 6 hours  polyethylene glycol 3350 17 Gram(s) Oral every 12 hours  predniSONE   Tablet 60 milliGRAM(s) Oral daily  vitamin A &amp; D Ointment 1 Application(s) Topical daily    MEDICATIONS  (PRN):  lactulose Retention Enema 200 Gram(s) Rectal two times a day PRN constipation  morphine  - Injectable 2 milliGRAM(s) IV Push three times a day PRN Severe Pain (7 - 10)      New X-rays reviewed:                                                                                  ECHO    CXR interpreted by me:

## 2022-02-23 NOTE — PROGRESS NOTE ADULT - ASSESSMENT
IMPRESSION:  Acute Hypoxic Respiratory Failure   Possible aspiration  COVID 19 infection   JILL resolved   HO bullous pemphigoid     PLAN:    CNS: SAT.  Adequate sedation     HEENT: Oral care    PULMONARY:  HOB at 45 degrees.  No vent changes.  Tracheostomy       CARDIOVASCULAR:  Avoid overload.  Keep even balance     GI: GI prophylaxis.  Bowel regimen.  OG Feeding.     RENAL:  FU Lytes.  Correct as needed.      INFECTIOUS DISEASE:  ABX per ID.  Repeat Procal noted.  Follow up cultures.    HEMATOLOGICAL:  DVT prophylaxis. LE duplex and CTA negative     ENDOCRINE:  Follow up FS. Insulin protocol if needed.  Steroids per burn and Derm      MUSCULOSKELETAL;  Bed rest.      Prognosis poor

## 2022-02-23 NOTE — PROGRESS NOTE ADULT - ASSESSMENT
· Assessment	  89 y/o F w/ PMH/o HTN (as per son at bedside) presenting to the hospital BIBA after experiencing respiratory distress. According to the patients family, the patient was being fed when she began choking and had difficulty breathing. Pt was bag masked by EMS and transported to the hospital. According to the family, the patient began to experience diffuse bullae across her entire body as well as ulcerations in her mouth for the past month. The family denied noticing fever or chills.  In the ED, patient was tachypneic and hypoxic,  intubated for airway protection. Pt was found to hypothermic, started on warming blanket. Pt started on IV levophed. S/p IV vanc/ cefepime.    IMPRESSION;  Diffuse bullae : s/p Bx 2/9 > bullous pemphigoid  MSOF  JILL > resolved  Aspiration with chemical pneumonitis   2/9 Sputum > P aeruginosa > colonizer ?  2/8 BCx NG  2/9 UCx NG  2/18 CT : no PE    COVID-19 NG 1/9  COVID-19 positive 1/15  Ddimers 3908  Ferritin 521  Procal 1.14    CXR opacities right  Presently on treatment for possible bacterial PNA right  Pt was in the early viral replicative phase based on the timeline/onset of symptoms.       RECOMMENDATIONS;  Zosyn 2.250 gm iv q6h  Dexamethasone 6 mg iv q24h for 10 days only  Monitor for side effects: hyperglycemia, neurological ( agitation/confusion), adrenal suppression, bacterial and fungal infections   Anticoagulation as per team.   Off loading to prevent pressure sores and preventive measures to avoid aspiration   If any questions , please call [phone] or send a message on [employer] teams  Please update ID in real time with any pertinent new laboratory /microbiological/radiographically findings or a change in the clinical characteristics   Prognosis is extremely poor given her advanced age and poor nutritional status

## 2022-02-23 NOTE — PROVIDER CONTACT NOTE (OTHER) - BACKGROUND
covid positive, respiratory distress
covid positive, respiratory failure.
respiratory failure, was covid positive
covid positive, resp failure
covid positive, respiratory distress
covid positive, resp distress

## 2022-02-23 NOTE — PROVIDER CONTACT NOTE (OTHER) - ASSESSMENT
Patient sedated on precedex. Bradycardiac HR 51-53, /49. Pt returned from OR at 2000
Pt intubated, sedated on fentanyl drip. Pt /91 hr 80. Pt becomes agitated/pain when moving her or performing dressing changes.
Pt intubated, sedated with fentanyl. Pt hypertensive 182/84 during dressing changes. Pt pre-medicated with morphine 2mg IVP for pain however patient still appears to be in pain.
/86 hr 67, pt appears to be sleep
Patient intubated, sedated on fentanyl drip. Pt /93 hr 79. Pt becomes agitated/anxious when performing interventions.
Pt intubated/sedated. Pt on glucerna tube feeds via orogastric tube. Tube feeds were noted to be in endotracheal tubing. Pt overbreathing the vent, O2 sat % while on vent. Tube feed residual at this time was noted to be 60ml

## 2022-02-23 NOTE — PROVIDER CONTACT NOTE (OTHER) - SITUATION
Pt hypertensive after administration of dilaudid 1mg IVP
Pt vomited tube feeds via endotracheal tube
Patient bradycardiac
Patient hypertensive /93 () HR 79
Pt hypertensive /91 () HR 80
pt hypertensive after dressing changes & appears in pain

## 2022-02-23 NOTE — PROGRESS NOTE ADULT - SUBJECTIVE AND OBJECTIVE BOX
----------Daily Progress Note----------    HISTORY OF PRESENT ILLNESS:  Patient is a 88y old Female who presents with a chief complaint of hypoxia (19 Feb 2022 09:16)    Currently admitted to medicine with the primary diagnosis of Acute respiratory failure with hypoxia       Today is hospital day 14d.     INTERVAL HOSPITAL COURSE / OVERNIGHT EVENTS:    Pt intubated and sedated, no events overnight. Pt planned for trach/PEG either today or tomorrow    Review of Systems: Otherwise unremarkable     <<<<<PAST MEDICAL & SURGICAL HISTORY>>>>>    ALLERGIES  No Known Allergies      Home Medications:        MEDICATIONS  STANDING MEDICATIONS  chlorhexidine 0.12% Liquid 15 milliLiter(s) Oral Mucosa every 12 hours  chlorhexidine 4% Liquid 1 Application(s) Topical <User Schedule>  collagenase Ointment 1 Application(s) Topical two times a day  dexMEDEtomidine Infusion 0.2 MICROgram(s)/kG/Hr IV Continuous <Continuous>  dextrose 40% Gel 15 Gram(s) Oral once  dextrose 5%. 1000 milliLiter(s) IV Continuous <Continuous>  dextrose 5%. 1000 milliLiter(s) IV Continuous <Continuous>  dextrose 50% Injectable 25 Gram(s) IV Push once  dextrose 50% Injectable 12.5 Gram(s) IV Push once  dextrose 50% Injectable 25 Gram(s) IV Push once  fentaNYL   Infusion. 0.587 MICROgram(s)/kG/Hr IV Continuous <Continuous>  glucagon  Injectable 1 milliGRAM(s) IntraMuscular once  heparin   Injectable 5000 Unit(s) SubCutaneous every 8 hours  influenza  Vaccine (HIGH DOSE) 0.7 milliLiter(s) IntraMuscular once  insulin lispro (ADMELOG) corrective regimen sliding scale   SubCutaneous every 6 hours  lactulose Syrup 20 Gram(s) Oral two times a day  norepinephrine Infusion 0.05 MICROgram(s)/kG/Min IV Continuous <Continuous>  pantoprazole  Injectable 40 milliGRAM(s) IV Push daily  piperacillin/tazobactam IVPB.. 2.25 Gram(s) IV Intermittent every 6 hours  polyethylene glycol 3350 17 Gram(s) Oral every 12 hours  predniSONE   Tablet 40 milliGRAM(s) Oral daily  vitamin A &amp; D Ointment 1 Application(s) Topical daily    PRN MEDICATIONS  lactulose Retention Enema 200 Gram(s) Rectal two times a day PRN  morphine  - Injectable 2 milliGRAM(s) IV Push three times a day PRN    VITALS:  T(F): 97.7  HR: 81  BP: 132/63  RR: 30  SpO2: 98%    <<<<<LABS>>>>>                        8.1    16.28 )-----------( 326      ( 23 Feb 2022 04:35 )             24.3     02-23    133<L>  |  97<L>  |  19  ----------------------------<  109<H>  4.0   |  26  |  <0.5<L>    Ca    7.0<L>      23 Feb 2022 04:35  Mg     2.1     02-23    TPro  4.7<L>  /  Alb  1.9<L>  /  TBili  1.3<H>  /  DBili  x   /  AST  31  /  ALT  83<H>  /  AlkPhos  130<H>  02-23    PT/INR - ( 22 Feb 2022 19:30 )   PT: 14.10 sec;   INR: 1.23 ratio         PTT - ( 22 Feb 2022 19:30 )  PTT:30.0 sec    ABG - ( 23 Feb 2022 03:40 )  pH, Arterial: 7.57  pH, Blood: x     /  pCO2: 30    /  pO2: 97    / HCO3: 28    / Base Excess: 5.4   /  SaO2: 98.4                [id number]        <<<<<RADIOLOGY>>>>>    < from: Xray Chest 1 View- PORTABLE-Routine (Xray Chest 1 View- PORTABLE-Routine in AM.) (02.22.22 @ 06:36) >    PROCEDURE DATE:  02/22/2022          INTERPRETATION:  Clinical History / Reason for exam: Intubation    Comparison : Chest radiograph 2/21/2022.    Technique/Positioning: Frontal chest radiograph.    Findings:    Support devices: Endotracheal tube in satisfactory position. Feeding tube   tip overlying the stomach. Stable right-sided central venous catheter.    Cardiac/mediastinum/hilum: Unchanged    Lung parenchyma/Pleura: Patient chin obscures evaluation of the left lung   apex. Patchy right-sided opacities similar to prior. No pneumothorax.    Skeleton/soft tissues: Unchanged    Impression:    Patchy right-sided opacity similar to prior    < end of copied text >      <<<<<PHYSICAL EXAM>>>>>  GENERAL: ill-appearing; intubated and sedated  PULMONARY: Clear to auscultation bilaterally. Intubated  CARDIOVASCULAR: Regular rate and rhythm, S1-S2, no murmurs  GASTROINTESTINAL: Soft, non-tender, non-distended, no guarding.  SKIN/EXTREMITIES: bullae on hands b/l; b/l UE/LE wrapped  NEUROLOGIC: intubated and sedated        ----------------------------------------------------------------------------------------------------------------------------------------------------------------------------------------------- ----------Daily Progress Note----------    HISTORY OF PRESENT ILLNESS:  Patient is a 88y old Female who presents with a chief complaint of hypoxia (19 Feb 2022 09:16)    Currently admitted to medicine with the primary diagnosis of Acute respiratory failure with hypoxia       Today is hospital day 14d.     INTERVAL HOSPITAL COURSE / OVERNIGHT EVENTS:    Pt intubated and sedated, no events overnight. Pt planned for trach/PEG either today or tomorrow    Review of Systems: Otherwise unremarkable     Attending Note: Pt seen and examined at bedside. Pt vented    Mode: AC/ CMV (Assist Control/ Continuous Mandatory Ventilation)  RR (machine): 12  TV (machine): 300  FiO2: 30  PEEP: 5  ITime: 1  MAP: 9  PIP: 20    ( 23 Feb 2022 03:40 ) pH: 7.57  /  pCO2: 30    /  pO2: 97    / HCO3: 28    / Base Excess: 5.4   /  SaO2: 98.4          , ( 17 Feb 2022 08:56 ) pH: 7.45  /  pCO2: 38    /  pO2: 148   / HCO3: 26    / Base Excess: 2.3   /  SaO2: 100.0         , ( 17 Feb 2022 05:18 ) pH: 7.48  /  pCO2: 35    /  pO2: 165   / HCO3: 26    / Base Excess: 2.6   /  SaO2: 99.5              <<<<<PAST MEDICAL & SURGICAL HISTORY>>>>>    ALLERGIES  No Known Allergies      Home Medications:        MEDICATIONS  STANDING MEDICATIONS  chlorhexidine 0.12% Liquid 15 milliLiter(s) Oral Mucosa every 12 hours  chlorhexidine 4% Liquid 1 Application(s) Topical <User Schedule>  collagenase Ointment 1 Application(s) Topical two times a day  dexMEDEtomidine Infusion 0.2 MICROgram(s)/kG/Hr IV Continuous <Continuous>  dextrose 40% Gel 15 Gram(s) Oral once  dextrose 5%. 1000 milliLiter(s) IV Continuous <Continuous>  dextrose 5%. 1000 milliLiter(s) IV Continuous <Continuous>  dextrose 50% Injectable 25 Gram(s) IV Push once  dextrose 50% Injectable 12.5 Gram(s) IV Push once  dextrose 50% Injectable 25 Gram(s) IV Push once  fentaNYL   Infusion. 0.587 MICROgram(s)/kG/Hr IV Continuous <Continuous>  glucagon  Injectable 1 milliGRAM(s) IntraMuscular once  heparin   Injectable 5000 Unit(s) SubCutaneous every 8 hours  influenza  Vaccine (HIGH DOSE) 0.7 milliLiter(s) IntraMuscular once  insulin lispro (ADMELOG) corrective regimen sliding scale   SubCutaneous every 6 hours  lactulose Syrup 20 Gram(s) Oral two times a day  norepinephrine Infusion 0.05 MICROgram(s)/kG/Min IV Continuous <Continuous>  pantoprazole  Injectable 40 milliGRAM(s) IV Push daily  piperacillin/tazobactam IVPB.. 2.25 Gram(s) IV Intermittent every 6 hours  polyethylene glycol 3350 17 Gram(s) Oral every 12 hours  predniSONE   Tablet 40 milliGRAM(s) Oral daily  vitamin A &amp; D Ointment 1 Application(s) Topical daily    PRN MEDICATIONS  lactulose Retention Enema 200 Gram(s) Rectal two times a day PRN  morphine  - Injectable 2 milliGRAM(s) IV Push three times a day PRN    VITALS:  T(F): 97.7  HR: 81  BP: 132/63  RR: 30  SpO2: 98%    <<<<<LABS>>>>>                        8.1    16.28 )-----------( 326      ( 23 Feb 2022 04:35 )             24.3     02-23    133<L>  |  97<L>  |  19  ----------------------------<  109<H>  4.0   |  26  |  <0.5<L>    Ca    7.0<L>      23 Feb 2022 04:35  Mg     2.1     02-23    TPro  4.7<L>  /  Alb  1.9<L>  /  TBili  1.3<H>  /  DBili  x   /  AST  31  /  ALT  83<H>  /  AlkPhos  130<H>  02-23    PT/INR - ( 22 Feb 2022 19:30 )   PT: 14.10 sec;   INR: 1.23 ratio         PTT - ( 22 Feb 2022 19:30 )  PTT:30.0 sec    ABG - ( 23 Feb 2022 03:40 )  pH, Arterial: 7.57  pH, Blood: x     /  pCO2: 30    /  pO2: 97    / HCO3: 28    / Base Excess: 5.4   /  SaO2: 98.4                [id number]        <<<<<RADIOLOGY>>>>>    < from: Xray Chest 1 View- PORTABLE-Routine (Xray Chest 1 View- PORTABLE-Routine in AM.) (02.22.22 @ 06:36) >    PROCEDURE DATE:  02/22/2022          INTERPRETATION:  Clinical History / Reason for exam: Intubation    Comparison : Chest radiograph 2/21/2022.    Technique/Positioning: Frontal chest radiograph.    Findings:    Support devices: Endotracheal tube in satisfactory position. Feeding tube   tip overlying the stomach. Stable right-sided central venous catheter.    Cardiac/mediastinum/hilum: Unchanged    Lung parenchyma/Pleura: Patient chin obscures evaluation of the left lung   apex. Patchy right-sided opacities similar to prior. No pneumothorax.    Skeleton/soft tissues: Unchanged    Impression:    Patchy right-sided opacity similar to prior    < end of copied text >      <<<<<PHYSICAL EXAM>>>>>  GENERAL: ill-appearing; intubated and sedated  PULMONARY: Clear to auscultation bilaterally. Intubated  CARDIOVASCULAR: Regular rate and rhythm, S1-S2, no murmurs  GASTROINTESTINAL: Soft, non-tender, non-distended, no guarding.  SKIN/EXTREMITIES: bullae on hands b/l; b/l UE/LE wrapped  NEUROLOGIC: intubated and sedated        -----------------------------------------------------------------------------------------------------------------------------------------------------------------------------------------------

## 2022-02-23 NOTE — PROVIDER CONTACT NOTE (OTHER) - ACTION/TREATMENT ORDERED:
MD Morillo placed order for labetalol 10mg IVP
MD Mcmahan at bedside states hold feeds, place OGT to low wall suction and continue to monitor patient at this time.
MD Morillo states continue to monitor BP at this time, if BP >200, notify MD
MD Merced Maldonado states recheck BP in 20minutes and call with results. If patient still hypertensive, will order medication
MD Nelson states decrease precedex rate and continue to monitor patient at this time since s/p OR
MD Morillo placed order for dilaudid 1mg IVP for pain management, will reassess BP
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
## 2022-02-24 LAB
ALBUMIN SERPL ELPH-MCNC: 1.7 G/DL — LOW (ref 3.5–5.2)
ALP SERPL-CCNC: 132 U/L — HIGH (ref 30–115)
ALT FLD-CCNC: 71 U/L — HIGH (ref 0–41)
ANION GAP SERPL CALC-SCNC: 8 MMOL/L — SIGNIFICANT CHANGE UP (ref 7–14)
AST SERPL-CCNC: 28 U/L — SIGNIFICANT CHANGE UP (ref 0–41)
BASE EXCESS BLDA CALC-SCNC: 4.9 MMOL/L — HIGH (ref -2–3)
BILIRUB SERPL-MCNC: 0.9 MG/DL — SIGNIFICANT CHANGE UP (ref 0.2–1.2)
BUN SERPL-MCNC: 15 MG/DL — SIGNIFICANT CHANGE UP (ref 10–20)
CALCIUM SERPL-MCNC: 6.9 MG/DL — LOW (ref 8.5–10.1)
CHLORIDE SERPL-SCNC: 101 MMOL/L — SIGNIFICANT CHANGE UP (ref 98–110)
CO2 SERPL-SCNC: 27 MMOL/L — SIGNIFICANT CHANGE UP (ref 17–32)
CREAT SERPL-MCNC: <0.5 MG/DL — LOW (ref 0.7–1.5)
D DIMER BLD IA.RAPID-MCNC: 881 NG/ML DDU — HIGH (ref 0–230)
GLUCOSE BLDC GLUCOMTR-MCNC: 108 MG/DL — HIGH (ref 70–99)
GLUCOSE BLDC GLUCOMTR-MCNC: 124 MG/DL — HIGH (ref 70–99)
GLUCOSE BLDC GLUCOMTR-MCNC: 149 MG/DL — HIGH (ref 70–99)
GLUCOSE SERPL-MCNC: 105 MG/DL — HIGH (ref 70–99)
GRAM STN FLD: SIGNIFICANT CHANGE UP
HCO3 BLDA-SCNC: 28 MMOL/L — SIGNIFICANT CHANGE UP (ref 21–28)
HCT VFR BLD CALC: 23.5 % — LOW (ref 37–47)
HGB BLD-MCNC: 7.6 G/DL — LOW (ref 12–16)
HOROWITZ INDEX BLDA+IHG-RTO: 30 — SIGNIFICANT CHANGE UP
MAGNESIUM SERPL-MCNC: 1.7 MG/DL — LOW (ref 1.8–2.4)
MCHC RBC-ENTMCNC: 29.7 PG — SIGNIFICANT CHANGE UP (ref 27–31)
MCHC RBC-ENTMCNC: 32.3 G/DL — SIGNIFICANT CHANGE UP (ref 32–37)
MCV RBC AUTO: 91.8 FL — SIGNIFICANT CHANGE UP (ref 81–99)
NRBC # BLD: 0 /100 WBCS — SIGNIFICANT CHANGE UP (ref 0–0)
PCO2 BLDA: 35 MMHG — SIGNIFICANT CHANGE UP (ref 25–48)
PH BLDA: 7.51 — HIGH (ref 7.35–7.45)
PLATELET # BLD AUTO: 296 K/UL — SIGNIFICANT CHANGE UP (ref 130–400)
PO2 BLDA: 121 MMHG — HIGH (ref 83–108)
POTASSIUM SERPL-MCNC: 3.3 MMOL/L — LOW (ref 3.5–5)
POTASSIUM SERPL-SCNC: 3.3 MMOL/L — LOW (ref 3.5–5)
PROT SERPL-MCNC: 4.6 G/DL — LOW (ref 6–8)
RBC # BLD: 2.56 M/UL — LOW (ref 4.2–5.4)
RBC # FLD: 18.5 % — HIGH (ref 11.5–14.5)
SAO2 % BLDA: 100 % — HIGH (ref 94–98)
SODIUM SERPL-SCNC: 136 MMOL/L — SIGNIFICANT CHANGE UP (ref 135–146)
SPECIMEN SOURCE: SIGNIFICANT CHANGE UP
WBC # BLD: 19.13 K/UL — HIGH (ref 4.8–10.8)
WBC # FLD AUTO: 19.13 K/UL — HIGH (ref 4.8–10.8)

## 2022-02-24 PROCEDURE — 99231 SBSQ HOSP IP/OBS SF/LOW 25: CPT

## 2022-02-24 PROCEDURE — 99232 SBSQ HOSP IP/OBS MODERATE 35: CPT | Mod: 25

## 2022-02-24 PROCEDURE — 71045 X-RAY EXAM CHEST 1 VIEW: CPT | Mod: 26

## 2022-02-24 PROCEDURE — 11045 DBRDMT SUBQ TISS EACH ADDL: CPT

## 2022-02-24 PROCEDURE — 99232 SBSQ HOSP IP/OBS MODERATE 35: CPT

## 2022-02-24 PROCEDURE — 99233 SBSQ HOSP IP/OBS HIGH 50: CPT

## 2022-02-24 PROCEDURE — 93970 EXTREMITY STUDY: CPT | Mod: 26

## 2022-02-24 PROCEDURE — 11042 DBRDMT SUBQ TIS 1ST 20SQCM/<: CPT

## 2022-02-24 RX ORDER — COLLAGENASE CLOSTRIDIUM HIST. 250 UNIT/G
1 OINTMENT (GRAM) TOPICAL
Refills: 0 | Status: DISCONTINUED | OUTPATIENT
Start: 2022-02-24 | End: 2022-03-01

## 2022-02-24 RX ORDER — POTASSIUM CHLORIDE 20 MEQ
20 PACKET (EA) ORAL
Refills: 0 | Status: COMPLETED | OUTPATIENT
Start: 2022-02-24 | End: 2022-02-24

## 2022-02-24 RX ORDER — MAGNESIUM SULFATE 500 MG/ML
1 VIAL (ML) INJECTION ONCE
Refills: 0 | Status: COMPLETED | OUTPATIENT
Start: 2022-02-24 | End: 2022-02-24

## 2022-02-24 RX ORDER — FENTANYL CITRATE 50 UG/ML
12.5 INJECTION INTRAVENOUS EVERY 8 HOURS
Refills: 0 | Status: DISCONTINUED | OUTPATIENT
Start: 2022-02-24 | End: 2022-03-01

## 2022-02-24 RX ADMIN — Medication 40 MILLIGRAM(S): at 22:02

## 2022-02-24 RX ADMIN — PIPERACILLIN AND TAZOBACTAM 200 GRAM(S): 4; .5 INJECTION, POWDER, LYOPHILIZED, FOR SOLUTION INTRAVENOUS at 06:19

## 2022-02-24 RX ADMIN — Medication 1 APPLICATION(S): at 13:22

## 2022-02-24 RX ADMIN — ENOXAPARIN SODIUM 40 MILLIGRAM(S): 100 INJECTION SUBCUTANEOUS at 11:56

## 2022-02-24 RX ADMIN — Medication 50 MILLIEQUIVALENT(S): at 13:30

## 2022-02-24 RX ADMIN — CHLORHEXIDINE GLUCONATE 15 MILLILITER(S): 213 SOLUTION TOPICAL at 06:20

## 2022-02-24 RX ADMIN — Medication 1 APPLICATION(S): at 17:10

## 2022-02-24 RX ADMIN — Medication 50 MILLIEQUIVALENT(S): at 08:46

## 2022-02-24 RX ADMIN — POLYETHYLENE GLYCOL 3350 17 GRAM(S): 17 POWDER, FOR SOLUTION ORAL at 17:09

## 2022-02-24 RX ADMIN — Medication 100 GRAM(S): at 13:13

## 2022-02-24 RX ADMIN — FENTANYL CITRATE 12.5 MICROGRAM(S): 50 INJECTION INTRAVENOUS at 13:36

## 2022-02-24 RX ADMIN — CHLORHEXIDINE GLUCONATE 15 MILLILITER(S): 213 SOLUTION TOPICAL at 17:09

## 2022-02-24 RX ADMIN — PIPERACILLIN AND TAZOBACTAM 200 GRAM(S): 4; .5 INJECTION, POWDER, LYOPHILIZED, FOR SOLUTION INTRAVENOUS at 17:09

## 2022-02-24 RX ADMIN — MORPHINE SULFATE 2 MILLIGRAM(S): 50 CAPSULE, EXTENDED RELEASE ORAL at 09:30

## 2022-02-24 RX ADMIN — MORPHINE SULFATE 2 MILLIGRAM(S): 50 CAPSULE, EXTENDED RELEASE ORAL at 09:45

## 2022-02-24 RX ADMIN — PANTOPRAZOLE SODIUM 40 MILLIGRAM(S): 20 TABLET, DELAYED RELEASE ORAL at 11:56

## 2022-02-24 RX ADMIN — LACTULOSE 20 GRAM(S): 10 SOLUTION ORAL at 17:11

## 2022-02-24 RX ADMIN — Medication 50 MILLIEQUIVALENT(S): at 11:07

## 2022-02-24 RX ADMIN — Medication 6 MILLIGRAM(S): at 06:19

## 2022-02-24 RX ADMIN — FENTANYL CITRATE 12.5 MICROGRAM(S): 50 INJECTION INTRAVENOUS at 13:21

## 2022-02-24 RX ADMIN — Medication 1 APPLICATION(S): at 11:58

## 2022-02-24 RX ADMIN — Medication 1 APPLICATION(S): at 06:20

## 2022-02-24 RX ADMIN — CHLORHEXIDINE GLUCONATE 1 APPLICATION(S): 213 SOLUTION TOPICAL at 06:20

## 2022-02-24 RX ADMIN — PIPERACILLIN AND TAZOBACTAM 200 GRAM(S): 4; .5 INJECTION, POWDER, LYOPHILIZED, FOR SOLUTION INTRAVENOUS at 11:56

## 2022-02-24 NOTE — PROCEDURE NOTE - NSICDXPROCEDURE_GEN_ALL_CORE_FT
PROCEDURES:  Debridement of sacrum 24-Feb-2022 13:26:26  Yumiko Espinoza   PROCEDURES:  Debridement of sacrum 24-Feb-2022 13:26:26 and left buttock - excisional debridement including Yumiko Ortiz

## 2022-02-24 NOTE — PROGRESS NOTE ADULT - SUBJECTIVE AND OBJECTIVE BOX
Patient is a 88y old  Female who presents with a chief complaint of hypoxia (19 Feb 2022 09:16)        Over Night Events:  SP trach.  Off pressors.  Sedated.          ROS:     All ROS are negative except HPI         PHYSICAL EXAM    ICU Vital Signs Last 24 Hrs  T(C): 36 (24 Feb 2022 04:00), Max: 37.1 (23 Feb 2022 16:00)  T(F): 96.8 (24 Feb 2022 04:00), Max: 98.8 (23 Feb 2022 16:00)  HR: 62 (24 Feb 2022 07:00) (51 - 81)  BP: 131/61 (24 Feb 2022 07:00) (99/49 - 180/78)  BP(mean): 88 (24 Feb 2022 07:00) (70 - 112)  ABP: --  ABP(mean): --  RR: 15 (24 Feb 2022 07:00) (12 - 30)  SpO2: 99% (24 Feb 2022 07:00) (97% - 100%)      CONSTITUTIONAL:  Ill appearing. in NAD    ENT:   Airway patent,   Mouth with normal mucosa.   No thrush    EYES:   Pupils equal,   Round and reactive to light.    CARDIAC:   Normal rate,   Regular rhythm.    No edema      Vascular:  Normal systolic impulse  No Carotid bruits    RESPIRATORY:   No wheezing  Bilateral BS  Normal chest expansion  Not tachypneic,  No use of accessory muscles    GASTROINTESTINAL:  Abdomen soft,   Non-tender,   No guarding,   + BS    MUSCULOSKELETAL:   Range of motion is  limited,  No clubbing, cyanosis    NEUROLOGICAL:   Sedated     SKIN:   Skin normal color for race,     PSYCHIATRIC:   Sedated   No apparent risk to self or others.    HEMATOLOGICAL:  No cervical  lymphadenopathy.  no inguinal lymphadenopathy      02-23-22 @ 07:01  -  02-24-22 @ 07:00  --------------------------------------------------------  IN:    Dexmedetomidine: 100.7 mL    IV PiggyBack: 300 mL  Total IN: 400.7 mL    OUT:    Indwelling Catheter - Urethral (mL): 1235 mL    PEGJ (Percutaneous Endoscopic Gastrojejunostomy) Tube (mL): 40 mL  Total OUT: 1275 mL    Total NET: -874.3 mL          LABS:                            7.6    19.13 )-----------( 296      ( 24 Feb 2022 05:10 )             23.5                                               02-24    136  |  101  |  15  ----------------------------<  105<H>  3.3<L>   |  27  |  <0.5<L>    Ca    6.9<L>      24 Feb 2022 05:10  Mg     1.7     02-24    TPro  4.6<L>  /  Alb  1.7<L>  /  TBili  0.9  /  DBili  x   /  AST  28  /  ALT  71<H>  /  AlkPhos  132<H>  02-24      PT/INR - ( 22 Feb 2022 19:30 )   PT: 14.10 sec;   INR: 1.23 ratio         PTT - ( 22 Feb 2022 19:30 )  PTT:30.0 sec                                                                                     LIVER FUNCTIONS - ( 24 Feb 2022 05:10 )  Alb: 1.7 g/dL / Pro: 4.6 g/dL / ALK PHOS: 132 U/L / ALT: 71 U/L / AST: 28 U/L / GGT: x                                                                                               Mode: AC/ CMV (Assist Control/ Continuous Mandatory Ventilation)  RR (machine): 12  TV (machine): 300  FiO2: 30  PEEP: 5  ITime: 1  MAP: 9  PIP: 18                                      ABG - ( 23 Feb 2022 03:40 )  pH, Arterial: 7.57  pH, Blood: x     /  pCO2: 30    /  pO2: 97    / HCO3: 28    / Base Excess: 5.4   /  SaO2: 98.4                MEDICATIONS  (STANDING):  chlorhexidine 0.12% Liquid 15 milliLiter(s) Oral Mucosa every 12 hours  chlorhexidine 4% Liquid 1 Application(s) Topical <User Schedule>  collagenase Ointment 1 Application(s) Topical two times a day  dexAMETHasone  Injectable 6 milliGRAM(s) IV Push daily  dexMEDEtomidine Infusion 0.2 MICROgram(s)/kG/Hr (1.92 mL/Hr) IV Continuous <Continuous>  dextrose 40% Gel 15 Gram(s) Oral once  dextrose 5%. 1000 milliLiter(s) (50 mL/Hr) IV Continuous <Continuous>  dextrose 5%. 1000 milliLiter(s) (100 mL/Hr) IV Continuous <Continuous>  dextrose 50% Injectable 25 Gram(s) IV Push once  dextrose 50% Injectable 12.5 Gram(s) IV Push once  dextrose 50% Injectable 25 Gram(s) IV Push once  enoxaparin Injectable 40 milliGRAM(s) SubCutaneous daily  fentaNYL   Infusion. 0.587 MICROgram(s)/kG/Hr (2.25 mL/Hr) IV Continuous <Continuous>  glucagon  Injectable 1 milliGRAM(s) IntraMuscular once  influenza  Vaccine (HIGH DOSE) 0.7 milliLiter(s) IntraMuscular once  insulin lispro (ADMELOG) corrective regimen sliding scale   SubCutaneous every 6 hours  lactulose Syrup 20 Gram(s) Oral two times a day  norepinephrine Infusion 0.05 MICROgram(s)/kG/Min (1.8 mL/Hr) IV Continuous <Continuous>  pantoprazole  Injectable 40 milliGRAM(s) IV Push daily  piperacillin/tazobactam IVPB.. 2.25 Gram(s) IV Intermittent every 6 hours  polyethylene glycol 3350 17 Gram(s) Oral every 12 hours  vitamin A &amp; D Ointment 1 Application(s) Topical daily    MEDICATIONS  (PRN):  lactulose Retention Enema 200 Gram(s) Rectal two times a day PRN constipation  morphine  - Injectable 2 milliGRAM(s) IV Push three times a day PRN Severe Pain (7 - 10)      New X-rays reviewed:                                                                                  ECHO    CXR interpreted by me:
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
## 2022-02-24 NOTE — PROGRESS NOTE ADULT - ASSESSMENT
ICU Management   G tube to gravity   Can start tube feeds 24 hours later   Pain management   Monitor trach for any bleeding

## 2022-02-24 NOTE — PROGRESS NOTE ADULT - SUBJECTIVE AND OBJECTIVE BOX
ARI MA  88y, Female    All available historical data reviewed    OVERNIGHT EVENTS:  no fevers  s/p trach  fio2 30%  off pressors    ROS:  unable to obtain history secondary to patient's mental status and/or sedation     VITALS:  T(F): 97.1, Max: 98.8 (02-23-22 @ 16:00)  HR: 55  BP: 129/63  RR: 20Vital Signs Last 24 Hrs  T(C): 36.2 (24 Feb 2022 08:00), Max: 37.1 (23 Feb 2022 16:00)  T(F): 97.1 (24 Feb 2022 08:00), Max: 98.8 (23 Feb 2022 16:00)  HR: 55 (24 Feb 2022 09:00) (51 - 79)  BP: 129/63 (24 Feb 2022 09:00) (99/49 - 180/78)  BP(mean): 90 (24 Feb 2022 09:00) (70 - 112)  RR: 20 (24 Feb 2022 09:00) (12 - 27)  SpO2: 100% (24 Feb 2022 09:00) (97% - 100%)    TESTS & MEASUREMENTS:                        7.6    19.13 )-----------( 296      ( 24 Feb 2022 05:10 )             23.5     02-24    136  |  101  |  15  ----------------------------<  105<H>  3.3<L>   |  27  |  <0.5<L>    Ca    6.9<L>      24 Feb 2022 05:10  Mg     1.7     02-24    TPro  4.6<L>  /  Alb  1.7<L>  /  TBili  0.9  /  DBili  x   /  AST  28  /  ALT  71<H>  /  AlkPhos  132<H>  02-24    LIVER FUNCTIONS - ( 24 Feb 2022 05:10 )  Alb: 1.7 g/dL / Pro: 4.6 g/dL / ALK PHOS: 132 U/L / ALT: 71 U/L / AST: 28 U/L / GGT: x                   RADIOLOGY & ADDITIONAL TESTS:  Personal review of radiological diagnostics performed  Echo and EKG results noted when applicable.     MEDICATIONS:  chlorhexidine 0.12% Liquid 15 milliLiter(s) Oral Mucosa every 12 hours  chlorhexidine 4% Liquid 1 Application(s) Topical <User Schedule>  collagenase Ointment 1 Application(s) Topical two times a day  dexAMETHasone  Injectable 6 milliGRAM(s) IV Push daily  dexMEDEtomidine Infusion 0.2 MICROgram(s)/kG/Hr IV Continuous <Continuous>  dextrose 40% Gel 15 Gram(s) Oral once  dextrose 5%. 1000 milliLiter(s) IV Continuous <Continuous>  dextrose 5%. 1000 milliLiter(s) IV Continuous <Continuous>  dextrose 50% Injectable 25 Gram(s) IV Push once  dextrose 50% Injectable 12.5 Gram(s) IV Push once  dextrose 50% Injectable 25 Gram(s) IV Push once  enoxaparin Injectable 40 milliGRAM(s) SubCutaneous daily  fentaNYL   Infusion. 0.587 MICROgram(s)/kG/Hr IV Continuous <Continuous>  glucagon  Injectable 1 milliGRAM(s) IntraMuscular once  influenza  Vaccine (HIGH DOSE) 0.7 milliLiter(s) IntraMuscular once  insulin lispro (ADMELOG) corrective regimen sliding scale   SubCutaneous every 6 hours  lactulose Retention Enema 200 Gram(s) Rectal two times a day PRN  lactulose Syrup 20 Gram(s) Oral two times a day  morphine  - Injectable 2 milliGRAM(s) IV Push three times a day PRN  norepinephrine Infusion 0.05 MICROgram(s)/kG/Min IV Continuous <Continuous>  pantoprazole  Injectable 40 milliGRAM(s) IV Push daily  piperacillin/tazobactam IVPB.. 2.25 Gram(s) IV Intermittent every 6 hours  polyethylene glycol 3350 17 Gram(s) Oral every 12 hours  potassium chloride  20 mEq/100 mL IVPB 20 milliEquivalent(s) IV Intermittent every 2 hours  vitamin A &amp; D Ointment 1 Application(s) Topical daily      ANTIBIOTICS:  piperacillin/tazobactam IVPB.. 2.25 Gram(s) IV Intermittent every 6 hours

## 2022-02-24 NOTE — PROGRESS NOTE ADULT - SUBJECTIVE AND OBJECTIVE BOX
----------Daily Progress Note----------    HISTORY OF PRESENT ILLNESS:  Patient is a 88y old Female who presents with a chief complaint of hypoxia (19 Feb 2022 09:16)    Currently admitted to medicine with the primary diagnosis of Acute respiratory failure with hypoxia       Today is hospital day 15d.     INTERVAL HOSPITAL COURSE / OVERNIGHT EVENTS:    S/p trach/PEG. Can start feeds tonight at 8PM    Review of Systems: Otherwise unremarkable     <<<<<PAST MEDICAL & SURGICAL HISTORY>>>>>    ALLERGIES  No Known Allergies      Home Medications:        MEDICATIONS  STANDING MEDICATIONS  chlorhexidine 0.12% Liquid 15 milliLiter(s) Oral Mucosa every 12 hours  chlorhexidine 4% Liquid 1 Application(s) Topical <User Schedule>  collagenase Ointment 1 Application(s) Topical two times a day  dexAMETHasone  Injectable 6 milliGRAM(s) IV Push daily  dexMEDEtomidine Infusion 0.2 MICROgram(s)/kG/Hr IV Continuous <Continuous>  dextrose 40% Gel 15 Gram(s) Oral once  dextrose 5%. 1000 milliLiter(s) IV Continuous <Continuous>  dextrose 5%. 1000 milliLiter(s) IV Continuous <Continuous>  dextrose 50% Injectable 25 Gram(s) IV Push once  dextrose 50% Injectable 12.5 Gram(s) IV Push once  dextrose 50% Injectable 25 Gram(s) IV Push once  enoxaparin Injectable 40 milliGRAM(s) SubCutaneous daily  fentaNYL   Infusion. 0.587 MICROgram(s)/kG/Hr IV Continuous <Continuous>  glucagon  Injectable 1 milliGRAM(s) IntraMuscular once  influenza  Vaccine (HIGH DOSE) 0.7 milliLiter(s) IntraMuscular once  insulin lispro (ADMELOG) corrective regimen sliding scale   SubCutaneous every 6 hours  lactulose Syrup 20 Gram(s) Oral two times a day  norepinephrine Infusion 0.05 MICROgram(s)/kG/Min IV Continuous <Continuous>  pantoprazole  Injectable 40 milliGRAM(s) IV Push daily  piperacillin/tazobactam IVPB.. 2.25 Gram(s) IV Intermittent every 6 hours  polyethylene glycol 3350 17 Gram(s) Oral every 12 hours  potassium chloride  20 mEq/100 mL IVPB 20 milliEquivalent(s) IV Intermittent every 2 hours  vitamin A &amp; D Ointment 1 Application(s) Topical daily    PRN MEDICATIONS  lactulose Retention Enema 200 Gram(s) Rectal two times a day PRN  morphine  - Injectable 2 milliGRAM(s) IV Push three times a day PRN    VITALS:  T(F): 96.8  HR: 62  BP: 131/61  RR: 15  SpO2: 99%    <<<<<LABS>>>>>                        7.6    19.13 )-----------( 296      ( 24 Feb 2022 05:10 )             23.5     02-24    136  |  101  |  15  ----------------------------<  105<H>  3.3<L>   |  27  |  <0.5<L>    Ca    6.9<L>      24 Feb 2022 05:10  Mg     1.7     02-24    TPro  4.6<L>  /  Alb  1.7<L>  /  TBili  0.9  /  DBili  x   /  AST  28  /  ALT  71<H>  /  AlkPhos  132<H>  02-24    PT/INR - ( 22 Feb 2022 19:30 )   PT: 14.10 sec;   INR: 1.23 ratio         PTT - ( 22 Feb 2022 19:30 )  PTT:30.0 sec    ABG - ( 23 Feb 2022 03:40 )  pH, Arterial: 7.57  pH, Blood: x     /  pCO2: 30    /  pO2: 97    / HCO3: 28    / Base Excess: 5.4   /  SaO2: 98.4                [id number]        <<<<<RADIOLOGY>>>>>    < from: [facility] Duplex Lower Ext Vein Scan, Bilat (02.24.22 @ 08:35) >  INTERPRETATION:  CLINICAL INFORMATION: The patient is a 88-year-old   female with bilateral leg swelling. A venous duplex examination was   performed to evaluate the patient for deep venous thrombosis of the lower   extremities.    The common femoral, great saphenous and femoral veins were visualized   bilaterally with no evidence of deep venous thrombosis. The bilateral   popliteal and short saphenous veins were not visualized due to open   wounds.    All veins were fully compressible.There was presence of spontaneous   flow, augmentation with distal compression and phasicity.    The anterior tibial veins were  patent    The posterior tibial veins were  patent    The peroneal veins were patent.    Impression:    No evidence of deep venous thrombosis or superficial thrombophlebitis in   the visualized bilateral lower extremities.    < end of copied text >      <<<<<PHYSICAL EXAM>>>>>  GENERAL: ill-appearing; trach/PEG tube intact  PULMONARY: Clear to auscultation bilaterally. Intubated  CARDIOVASCULAR: Regular rate and rhythm, S1-S2, no murmurs  GASTROINTESTINAL: PEG tube intact  SKIN/EXTREMITIES: bullae on hands and legs b/l  NEUROLOGIC: trached        ----------------------------------------------------------------------------------------------------------------------------------------------------------------------------------------------- ----------Daily Progress Note----------    HISTORY OF PRESENT ILLNESS:  Patient is a 88y old Female who presents with a chief complaint of hypoxia (19 Feb 2022 09:16)    Currently admitted to medicine with the primary diagnosis of Acute respiratory failure with hypoxia       Today is hospital day 15d.     Attending note: Pt seen and examined at bedside. Pt trach and vent now.     Mode: PS (Pressure Support)/ Spontaneous,per MD Dela Cruz  FiO2: 30  PEEP: 5  MAP: 11  PIP: 18    ABG - ( 23 Feb 2022 03:40 )  pH: 7.57  /  pCO2: 30    /  pO2: 97    / HCO3: 28    / Base Excess: 5.4   /  SaO2: 98.4          INTERVAL HOSPITAL COURSE / OVERNIGHT EVENTS:    S/p trach/PEG. Can start feeds tonight at 8PM    Review of Systems: Otherwise unremarkable     <<<<<PAST MEDICAL & SURGICAL HISTORY>>>>>    ALLERGIES  No Known Allergies      Home Medications:        MEDICATIONS  STANDING MEDICATIONS  chlorhexidine 0.12% Liquid 15 milliLiter(s) Oral Mucosa every 12 hours  chlorhexidine 4% Liquid 1 Application(s) Topical <User Schedule>  collagenase Ointment 1 Application(s) Topical two times a day  dexAMETHasone  Injectable 6 milliGRAM(s) IV Push daily  dexMEDEtomidine Infusion 0.2 MICROgram(s)/kG/Hr IV Continuous <Continuous>  dextrose 40% Gel 15 Gram(s) Oral once  dextrose 5%. 1000 milliLiter(s) IV Continuous <Continuous>  dextrose 5%. 1000 milliLiter(s) IV Continuous <Continuous>  dextrose 50% Injectable 25 Gram(s) IV Push once  dextrose 50% Injectable 12.5 Gram(s) IV Push once  dextrose 50% Injectable 25 Gram(s) IV Push once  enoxaparin Injectable 40 milliGRAM(s) SubCutaneous daily  fentaNYL   Infusion. 0.587 MICROgram(s)/kG/Hr IV Continuous <Continuous>  glucagon  Injectable 1 milliGRAM(s) IntraMuscular once  influenza  Vaccine (HIGH DOSE) 0.7 milliLiter(s) IntraMuscular once  insulin lispro (ADMELOG) corrective regimen sliding scale   SubCutaneous every 6 hours  lactulose Syrup 20 Gram(s) Oral two times a day  norepinephrine Infusion 0.05 MICROgram(s)/kG/Min IV Continuous <Continuous>  pantoprazole  Injectable 40 milliGRAM(s) IV Push daily  piperacillin/tazobactam IVPB.. 2.25 Gram(s) IV Intermittent every 6 hours  polyethylene glycol 3350 17 Gram(s) Oral every 12 hours  potassium chloride  20 mEq/100 mL IVPB 20 milliEquivalent(s) IV Intermittent every 2 hours  vitamin A &amp; D Ointment 1 Application(s) Topical daily    PRN MEDICATIONS  lactulose Retention Enema 200 Gram(s) Rectal two times a day PRN  morphine  - Injectable 2 milliGRAM(s) IV Push three times a day PRN    VITALS:  T(F): 96.8  HR: 62  BP: 131/61  RR: 15  SpO2: 99%    <<<<<LABS>>>>>                        7.6    19.13 )-----------( 296      ( 24 Feb 2022 05:10 )             23.5     02-24    136  |  101  |  15  ----------------------------<  105<H>  3.3<L>   |  27  |  <0.5<L>    Ca    6.9<L>      24 Feb 2022 05:10  Mg     1.7     02-24    TPro  4.6<L>  /  Alb  1.7<L>  /  TBili  0.9  /  DBili  x   /  AST  28  /  ALT  71<H>  /  AlkPhos  132<H>  02-24    PT/INR - ( 22 Feb 2022 19:30 )   PT: 14.10 sec;   INR: 1.23 ratio         PTT - ( 22 Feb 2022 19:30 )  PTT:30.0 sec    ABG - ( 23 Feb 2022 03:40 )  pH, Arterial: 7.57  pH, Blood: x     /  pCO2: 30    /  pO2: 97    / HCO3: 28    / Base Excess: 5.4   /  SaO2: 98.4            [id number]        <<<<<RADIOLOGY>>>>>    < from: [facility] Duplex Lower Ext Vein Scan, Nadine (02.24.22 @ 08:35) >  INTERPRETATION:  CLINICAL INFORMATION: The patient is a 88-year-old   female with bilateral leg swelling. A venous duplex examination was   performed to evaluate the patient for deep venous thrombosis of the lower   extremities.    The common femoral, great saphenous and femoral veins were visualized   bilaterally with no evidence of deep venous thrombosis. The bilateral   popliteal and short saphenous veins were not visualized due to open   wounds.    All veins were fully compressible.There was presence of spontaneous   flow, augmentation with distal compression and phasicity.    The anterior tibial veins were  patent    The posterior tibial veins were  patent    The peroneal veins were patent.    Impression:    No evidence of deep venous thrombosis or superficial thrombophlebitis in   the visualized bilateral lower extremities.    < end of copied text >      <<<<<PHYSICAL EXAM>>>>>  GENERAL: ill-appearing; trach/PEG tube intact  PULMONARY: Clear to auscultation bilaterally. Intubated  CARDIOVASCULAR: Regular rate and rhythm, S1-S2, no murmurs  GASTROINTESTINAL: PEG tube intact  SKIN/EXTREMITIES: bullae on hands and legs b/l  NEUROLOGIC: trached        -----------------------------------------------------------------------------------------------------------------------------------------------------------------------------------------------

## 2022-02-24 NOTE — PROGRESS NOTE ADULT - ASSESSMENT
87 y/o F w/ PMH/o HTN (as per son at bedside) presenting to the hospital BIBA after experiencing respiratory distress. According to the patients family, the patient was being fed when she began choking and had difficulty breathing. Pt was bag masked by EMS and transported to the hospital. According to the family, the patient began to experience diffuse skin reaction across her entire body as well as ulcerations in her mouth for the past month. The family denied noticing fever or chills.    # Acute hypoxemic respiratory failure 2/2 aspiration pna  - Intubated on arrival  -failed SAT 2/22  -CT surgery consulted for trach/PEG placement, preopped for trach/PEG  - DTA 2/9 negative  - blood cultures negative  - ABGs reviewed by pulm team and in rounds.   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 According to the patients family, the patient was being fed when she began choking and had difficulty breathing. Pt was bag masked by EMS and transported to the hospital. According to the family, the patient began to experience diffuse skin reaction across her entire body as well as ulcerations in her mouth for the past month. The family denied noticing fever or chills.    # Acute hypoxemic respiratory failure 2/2 aspiration pna  - Intubated on arrival  -failed SAT 2/22  -CT surgery consulted for trach/PEG placement, preopped for trach/PEG  - DTA 2/9 negative  - blood cultures negative  - ABGs reviewed by pulm team and in rounds.   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     # COVID-19  - COVID + on 2/15  - on decadron 6mg x10 days per ID  - Ferritin 521, procal 0.35, ,   - ID following    DVT ppx: heparin subq  GI ppx: protonix  Dispo: acute  Right radial A line 2/9 -> dc'ed 2/12  Left fem A line 2/13 -> dc'ed 2/17  R IJ TLC 2/14   89 y/o F w/ PMH/o HTN (as per son at bedside) presenting to the hospital BIBA after experiencing respiratory distress. According to the patients family, the patient was being fed when she began choking and had difficulty breathing. Pt was bag masked by EMS and transported to the hospital. According to the family, the patient began to experience diffuse skin reaction across her entire body as well as ulcerations in her mouth for the past month. The family denied noticing fever or chills.    # Acute hypoxemic respiratory failure 2/2 aspiration pna  - Intubated on arrival  -failed SAT 2/22  -CT surgery consulted for trach/PEG placement, preopped for trach/PEG  - DTA 2/9 negative  - blood cultures negative  - ABGs reviewed by pulm team and in rounds.   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     # COVID-19  - COVID + on 2/15  - on decadron 6mg x10 days per ID  - Ferritin 521, procal 0.35, ,   - ID following    DVT ppx: heparin subq  GI ppx: protonix  Dispo: acute  Right radial A line 2/9 -> dc'ed 2/12  Left fem A line 2/13 -> dc'ed 2/17  R IJ TLC 2/14

## 2022-02-24 NOTE — PROCEDURE NOTE - NSPOSTCAREGUIDE_GEN_A_CORE
Verbal/written post procedure instructions were given to patient/caregiver/Instructed patient/caregiver to follow-up with primary care physician/Instructed patient/caregiver regarding signs and symptoms of infection/Keep the cast/splint/dressing clean and dry/Care for catheter as per unit/ICU protocols
Instructed patient/caregiver to follow-up with primary care physician/Instructed patient/caregiver regarding signs and symptoms of infection/Keep the cast/splint/dressing clean and dry
Verbal/written post procedure instructions were given to patient/caregiver
Verbal/written post procedure instructions were given to patient/caregiver/Instructed patient/caregiver to follow-up with primary care physician/Instructed patient/caregiver regarding signs and symptoms of infection/Keep the cast/splint/dressing clean and dry/Care for catheter as per unit/ICU protocols

## 2022-02-24 NOTE — CONSULT NOTE ADULT - PROVIDER SPECIALTY LIST ADULT
Burn
Cardiology
Critical Care
Infectious Disease
Dermatology
Thoracic Surgery
Burn
Nephrology
Nutrition Support

## 2022-02-24 NOTE — CONSULT NOTE ADULT - CONSULT REQUESTED DATE/TIME
22-Feb-2022 11:08
10-Feb-2022 11:36
12-Feb-2022 10:00
24-Feb-2022 13:26
09-Feb-2022 05:48
09-Feb-2022 11:25
14-Feb-2022 09:55
09-Feb-2022 09:43
09-Feb-2022 00:25

## 2022-02-24 NOTE — CONSULT NOTE ADULT - ASSESSMENT
Pt is 87 y/o Female w/ PMH/o HTN admitted for respiratory failure requiring intubation; found to have extensive partial thickness wounds to entire body.   Skin biopsy 2/9 confirmed: bullous pemphigoid.  S/p skin biopsy to right hand by burn on 2/9.    #Bullous pemphigoid  - continue wound care daily: wash all wounds with soap and water, apply A&D ointment, and cover with xeroform;  - if dressings remain clean - can perform dressing changes every other day  - medical management per primary team    #Sacral ulcer  - debridement done today at bedside, telephone consent from son  - LWC: wash soap and water bid; santyl/hydrogel/abd tape bid  - offload/reposition q2-3h      #B/L heels  - LWC: betadine soaked gauze/wrap  - offload  - heel floaters Pt is 89 y/o Female w/ PMH/o HTN admitted for respiratory failure requiring intubation; found to have extensive partial thickness wounds to entire body.   Skin biopsy 2/9 confirmed: bullous pemphigoid.  S/p skin biopsy to right hand by burn on 2/9.    #Bullous pemphigoid  - continue wound care daily: wash all wounds with soap and water, apply A&D ointment, and cover with xeroform;  - if dressings remain clean - can perform dressing changes every other day  - medical management per primary team    #Sacral  and left buttock pressure ulcer  - debridement done today at bedside, telephone consent from son  - LWC: wash soap and water bid; santyl/hydrogel/abd tape bid  - offload/reposition q2-3h      #B/L heels  - LWC: betadine soaked gauze/wrap  - offload  - heel floaters

## 2022-02-24 NOTE — PROGRESS NOTE ADULT - ASSESSMENT
· Assessment	  87 y/o F w/ PMH/o HTN (as per son at bedside) presenting to the hospital BIBA after experiencing respiratory distress. According to the patients family, the patient was being fed when she began choking and had difficulty breathing. Pt was bag masked by EMS and transported to the hospital. According to the family, the patient began to experience diffuse bullae across her entire body as well as ulcerations in her mouth for the past month. The family denied noticing fever or chills.  In the ED, patient was tachypneic and hypoxic,  intubated for airway protection. Pt was found to hypothermic, started on warming blanket. Pt started on IV levophed. S/p IV vanc/ cefepime.    IMPRESSION;  Diffuse bullae : s/p Bx 2/9 > bullous pemphigoid  MSOF  JILL > resolved  Aspiration with probable bacterial PNA  2/9 Sputum > P aeruginosa > colonizer ?  2/8 BCx NG  2/9 UCx NG  2/18 CT : no PE    COVID-19 NG 1/9  COVID-19 positive 1/15  Ddimers 3908  Ferritin 521  Procal 1.14    CXR no change  2/23 S/p trach  Pt was in the early viral replicative phase based on the timeline/onset of symptoms.     s/p Dexamethason      RECOMMENDATIONS;  Zosyn 2.250 gm iv q6h for 14 days  Anticoagulation as per team.   Off loading to prevent pressure sores and preventive measures to avoid aspiration   If any questions , please call [phone] or send a message on [facility] teams  Please update ID in real time with any pertinent new laboratory /microbiological/radiographically findings or a change in the clinical characteristics   Prognosis is extremely poor given her advanced age and poor nutritional status

## 2022-02-24 NOTE — PROCEDURE NOTE - NSINFORMCONSENT_GEN_A_CORE

## 2022-02-24 NOTE — CONSULT NOTE ADULT - SUBJECTIVE AND OBJECTIVE BOX
HPI:  89 y/o F w/ PMH/o HTN (as per son at bedside) presenting to the hospital [facility] after experiencing respiratory distress. According to the patients family, the patient was being fed when she began choking and had difficulty breathing. Pt was bag masked by EMS and transported to the hospital. According to the family, the patient began to experience diffuse skin reaction across her entire body as well as ulcerations in her mouth for the past month. The family denied noticing fever or chills.    In the ED, patient was tachypneic and hypoxic,  intubated for airway protection. Pt was found to hypothermic, started on warming blanket. Pt started on IV levophed. S/p IV vanc/ cefepime. called to evaluate (09 Feb 2022 04:51)    Burn consulted for evaluation of sacral ulcer      PAST MEDICAL & SURGICAL HISTORY:      FAMILY HISTORY:      Allergies  No Known Allergies    Intolerances          ICU Vital Signs Last 24 Hrs  T(C): 36.2 (24 Feb 2022 08:00), Max: 37.1 (23 Feb 2022 16:00)  T(F): 97.1 (24 Feb 2022 08:00), Max: 98.8 (23 Feb 2022 16:00)  HR: 65 (24 Feb 2022 13:00) (48 - 76)  BP: 114/56 (24 Feb 2022 13:00) (99/49 - 180/78)  BP(mean): 81 (24 Feb 2022 13:00) (70 - 112)  RR: 18 (24 Feb 2022 11:00) (12 - 24)  SpO2: 99% (24 Feb 2022 13:00) (98% - 100%)        LABS:                          7.6    19.13 )-----------( 296      ( 24 Feb 2022 05:10 )             23.5                                               02-24    136  |  101  |  15  ----------------------------<  105<H>  3.3<L>   |  27  |  <0.5<L>    Ca    6.9<L>      24 Feb 2022 05:10  Mg     1.7     02-24    TPro  4.6<L>  /  Alb  1.7<L>  /  TBili  0.9  /  DBili  x   /  AST  28  /  ALT  71<H>  /  AlkPhos  132<H>  02-24      PT/INR - ( 22 Feb 2022 19:30 )   PT: 14.10 sec;   INR: 1.23 ratio         PTT - ( 22 Feb 2022 19:30 )  PTT:30.0 sec                                                                                     LIVER FUNCTIONS - ( 24 Feb 2022 05:10 )  Alb: 1.7 g/dL / Pro: 4.6 g/dL / ALK PHOS: 132 U/L / ALT: 71 U/L / AST: 28 U/L / GGT: x                                                                                               Mode: AC/ CMV (Assist Control/ Continuous Mandatory Ventilation)  RR (machine): 12  TV (machine): 300  FiO2: 30  PEEP: 5  ITime: 1  MAP: 8  PIP: 16                                      ABG - ( 24 Feb 2022 12:17 )  pH, Arterial: 7.51  pH, Blood: x     /  pCO2: 35    /  pO2: 121   / HCO3: 28    / Base Excess: 4.9   /  SaO2: 100.0                 MEDICATIONS  (STANDING):  chlorhexidine 0.12% Liquid 15 milliLiter(s) Oral Mucosa every 12 hours  chlorhexidine 4% Liquid 1 Application(s) Topical <User Schedule>  collagenase Ointment 1 Application(s) Topical two times a day  collagenase Ointment 1 Application(s) Topical two times a day  dexAMETHasone  Injectable 6 milliGRAM(s) IV Push daily  dextrose 40% Gel 15 Gram(s) Oral once  dextrose 5%. 1000 milliLiter(s) (50 mL/Hr) IV Continuous <Continuous>  dextrose 5%. 1000 milliLiter(s) (100 mL/Hr) IV Continuous <Continuous>  dextrose 50% Injectable 25 Gram(s) IV Push once  dextrose 50% Injectable 12.5 Gram(s) IV Push once  dextrose 50% Injectable 25 Gram(s) IV Push once  enoxaparin Injectable 40 milliGRAM(s) SubCutaneous daily  glucagon  Injectable 1 milliGRAM(s) IntraMuscular once  influenza  Vaccine (HIGH DOSE) 0.7 milliLiter(s) IntraMuscular once  insulin lispro (ADMELOG) corrective regimen sliding scale   SubCutaneous every 6 hours  lactulose Syrup 20 Gram(s) Oral two times a day  norepinephrine Infusion 0.05 MICROgram(s)/kG/Min (1.8 mL/Hr) IV Continuous <Continuous>  pantoprazole  Injectable 40 milliGRAM(s) IV Push daily  piperacillin/tazobactam IVPB.. 2.25 Gram(s) IV Intermittent every 6 hours  polyethylene glycol 3350 17 Gram(s) Oral every 12 hours  potassium chloride  20 mEq/100 mL IVPB 20 milliEquivalent(s) IV Intermittent every 2 hours  vitamin A &amp; D Ointment 1 Application(s) Topical daily    MEDICATIONS  (PRN):  fentaNYL    Injectable 12.5 MICROGram(s) IV Push every 8 hours PRN Moderate Pain (4 - 6)  lactulose Retention Enema 200 Gram(s) Rectal two times a day PRN constipation  morphine  - Injectable 2 milliGRAM(s) IV Push three times a day PRN Severe Pain (7 - 10)      Exam:  General: seen at bedside, intubated, on vent support, sedated  Skin/Wound: Areas of partial thickness wounds that are pink, moist, healing, healthy appearing to BUE, BLE and back, abdomen and buttocks. No purulent drainage. Minimal bleeding.   Sacrum: ~ 7x13cm wound with mostly black eschar - no active bleeding, no erythema, no purulence, no odor.   Bilateral heels: black dry necrotic eschar, no fluctuance, no purulence, no malodor- stable HPI:  87 y/o F w/ PMH/o HTN (as per son at bedside) presenting to the hospital [facility] after experiencing respiratory distress. According to the patients family, the patient was being fed when she began choking and had difficulty breathing. Pt was bag masked by EMS and transported to the hospital. According to the family, the patient began to experience diffuse skin reaction across her entire body as well as ulcerations in her mouth for the past month. The family denied noticing fever or chills.    In the ED, patient was tachypneic and hypoxic,  intubated for airway protection. Pt was found to hypothermic, started on warming blanket. Pt started on IV levophed. S/p IV vanc/ cefepime. called to evaluate (09 Feb 2022 04:51)    Burn consulted for evaluation of sacral ulcer      PAST MEDICAL & SURGICAL HISTORY:      FAMILY HISTORY:      Allergies  No Known Allergies    Intolerances          ICU Vital Signs Last 24 Hrs  T(C): 36.2 (24 Feb 2022 08:00), Max: 37.1 (23 Feb 2022 16:00)  T(F): 97.1 (24 Feb 2022 08:00), Max: 98.8 (23 Feb 2022 16:00)  HR: 65 (24 Feb 2022 13:00) (48 - 76)  BP: 114/56 (24 Feb 2022 13:00) (99/49 - 180/78)  BP(mean): 81 (24 Feb 2022 13:00) (70 - 112)  RR: 18 (24 Feb 2022 11:00) (12 - 24)  SpO2: 99% (24 Feb 2022 13:00) (98% - 100%)        LABS:                          7.6    19.13 )-----------( 296      ( 24 Feb 2022 05:10 )             23.5                                               02-24    136  |  101  |  15  ----------------------------<  105<H>  3.3<L>   |  27  |  <0.5<L>    Ca    6.9<L>      24 Feb 2022 05:10  Mg     1.7     02-24    TPro  4.6<L>  /  Alb  1.7<L>  /  TBili  0.9  /  DBili  x   /  AST  28  /  ALT  71<H>  /  AlkPhos  132<H>  02-24      PT/INR - ( 22 Feb 2022 19:30 )   PT: 14.10 sec;   INR: 1.23 ratio         PTT - ( 22 Feb 2022 19:30 )  PTT:30.0 sec                                                                                     LIVER FUNCTIONS - ( 24 Feb 2022 05:10 )  Alb: 1.7 g/dL / Pro: 4.6 g/dL / ALK PHOS: 132 U/L / ALT: 71 U/L / AST: 28 U/L / GGT: x                                                                                               Mode: AC/ CMV (Assist Control/ Continuous Mandatory Ventilation)  RR (machine): 12  TV (machine): 300  FiO2: 30  PEEP: 5  ITime: 1  MAP: 8  PIP: 16                                      ABG - ( 24 Feb 2022 12:17 )  pH, Arterial: 7.51  pH, Blood: x     /  pCO2: 35    /  pO2: 121   / HCO3: 28    / Base Excess: 4.9   /  SaO2: 100.0                 MEDICATIONS  (STANDING):  chlorhexidine 0.12% Liquid 15 milliLiter(s) Oral Mucosa every 12 hours  chlorhexidine 4% Liquid 1 Application(s) Topical <User Schedule>  collagenase Ointment 1 Application(s) Topical two times a day  collagenase Ointment 1 Application(s) Topical two times a day  dexAMETHasone  Injectable 6 milliGRAM(s) IV Push daily  dextrose 40% Gel 15 Gram(s) Oral once  dextrose 5%. 1000 milliLiter(s) (50 mL/Hr) IV Continuous <Continuous>  dextrose 5%. 1000 milliLiter(s) (100 mL/Hr) IV Continuous <Continuous>  dextrose 50% Injectable 25 Gram(s) IV Push once  dextrose 50% Injectable 12.5 Gram(s) IV Push once  dextrose 50% Injectable 25 Gram(s) IV Push once  enoxaparin Injectable 40 milliGRAM(s) SubCutaneous daily  glucagon  Injectable 1 milliGRAM(s) IntraMuscular once  influenza  Vaccine (HIGH DOSE) 0.7 milliLiter(s) IntraMuscular once  insulin lispro (ADMELOG) corrective regimen sliding scale   SubCutaneous every 6 hours  lactulose Syrup 20 Gram(s) Oral two times a day  norepinephrine Infusion 0.05 MICROgram(s)/kG/Min (1.8 mL/Hr) IV Continuous <Continuous>  pantoprazole  Injectable 40 milliGRAM(s) IV Push daily  piperacillin/tazobactam IVPB.. 2.25 Gram(s) IV Intermittent every 6 hours  polyethylene glycol 3350 17 Gram(s) Oral every 12 hours  potassium chloride  20 mEq/100 mL IVPB 20 milliEquivalent(s) IV Intermittent every 2 hours  vitamin A &amp; D Ointment 1 Application(s) Topical daily    MEDICATIONS  (PRN):  fentaNYL    Injectable 12.5 MICROGram(s) IV Push every 8 hours PRN Moderate Pain (4 - 6)  lactulose Retention Enema 200 Gram(s) Rectal two times a day PRN constipation  morphine  - Injectable 2 milliGRAM(s) IV Push three times a day PRN Severe Pain (7 - 10)      Exam:  General: seen at bedside, intubated, on vent support, sedated  Skin/Wound: Areas of partial thickness wounds that are pink, moist, healing, healthy appearing to BUE, BLE and back, abdomen and buttocks. No purulent drainage. Minimal bleeding.   Sacrum: ~ 7x13cm wound with mostly black eschar - no active bleeding, no erythema, no purulence, no odor.   Bilateral heels: black dry necrotic eschar right ; reddish discoloration left , no fluctuance, no purulence, no malodor- stable

## 2022-02-24 NOTE — PROCEDURE NOTE - NSSITEPREP_SKIN_A_CORE
alcohol/chlorhexidine/povidone iodine (if allergic to chlorhexidine)/povidone-iodine ( under 2 weeks of age or 1500 grams)/Adherence to aseptic technique: hand hygiene prior to donning barriers (gown, gloves), don cap and mask, sterile drape over patient
chlorhexidine

## 2022-02-24 NOTE — PROCEDURE NOTE - NSINDICATIONS_GEN_A_CORE
arterial puncture to obtain ABG's/critical patient/monitoring purposes
devitalized or nonviable tissue at the level of:
arterial puncture to obtain ABG's/critical patient/monitoring purposes
critical illness/emergency venous access

## 2022-02-24 NOTE — CONSULT NOTE ADULT - CONSULT REASON
Hypernatremia, JILL
Diffuse skin rash
Trach and PEG
enteral support
sacral ulcer
bullae
diffuse bullae
torsades
respiratory failure

## 2022-02-24 NOTE — PROGRESS NOTE ADULT - SUBJECTIVE AND OBJECTIVE BOX
ARI MA  88y Female   [id number]    Hospital Day: 16  Post Operative Day:  Procedure:s/p trach and peg   Patient is a 88y old  Female who presents with a chief complaint of hypoxia (2022 09:16)    PAST MEDICAL & SURGICAL HISTORY:      Events of the Last 24h:  Vital Signs Last 24 Hrs  T(C): 36.1 (2022 00:00), Max: 37.6 (2022 04:00)  T(F): 97 (2022 00:00), Max: 99.6 (2022 04:00)  HR: 62 (2022 00:00) (51 - 91)  BP: 105/61 (2022 00:00) (99/49 - 180/78)  BP(mean): 75 (2022 00:00) (70 - 112)  RR: 12 (2022 00:00) (12 - 36)  SpO2: 100% (2022 00:00) (97% - 100%)    Mode: AC/ CMV (Assist Control/ Continuous Mandatory Ventilation), RR (machine): 12, TV (machine): 300, FiO2: 30, PEEP: 5, ITime: 1, MAP: 9, PIP: 18    Diet, NPO after Midnight:      NPO Start Date: 2022,   NPO Start Time: 23:59  Except Medications (22 @ 11:25)  Diet, NPO with Tube Feed:   Tube Feeding Modality: Orogastric  Glucerna 1.2 Reynold  Total Volume for 24 Hours (mL): 840  Continuous  Until Goal Tube Feed Rate (mL per Hour): 35  Tube Feed Duration (in Hours): 24  Tube Feed Start Time: 10:00  Bolus   Total Volume per Flush (mL): 50   Frequency: Every 4 Hours  Free Water Flush Instructions:  3 Beneprotein at once at noon daily  Beneprotein ([facility] Only)     Qty per Day:  3 (22 @ 10:00)      I&O's Summary    2022 07:01  -  2022 07:00  --------------------------------------------------------  IN: 1350 mL / OUT: 1205 mL / NET: 145 mL    2022 07:  -  2022 00:51  --------------------------------------------------------  IN: 273.7 mL / OUT: 985 mL / NET: -711.3 mL     I&O's Detail    2022 07:  -  2022 07:00  --------------------------------------------------------  IN:    Dexmedetomidine: 68 mL    Free Water: 750 mL    Glucerna 1.5: 525 mL    Norepinephrine: 7 mL  Total IN: 1350 mL    OUT:    Indwelling Catheter - Urethral (mL): 1205 mL  Total OUT: 1205 mL    Total NET: 145 mL      2022 07:  -  2022 00:51  --------------------------------------------------------  IN:    Dexmedetomidine: 73.7 mL    IV PiggyBack: 200 mL  Total IN: 273.7 mL    OUT:    Indwelling Catheter - Urethral (mL): 985 mL  Total OUT: 985 mL    Total NET: -711.3 mL          MEDICATIONS  (STANDING):  chlorhexidine 0.12% Liquid 15 milliLiter(s) Oral Mucosa every 12 hours  chlorhexidine 4% Liquid 1 Application(s) Topical <User Schedule>  collagenase Ointment 1 Application(s) Topical two times a day  dexAMETHasone  Injectable 6 milliGRAM(s) IV Push daily  dexMEDEtomidine Infusion 0.2 MICROgram(s)/kG/Hr (1.92 mL/Hr) IV Continuous <Continuous>  dextrose 40% Gel 15 Gram(s) Oral once  dextrose 5%. 1000 milliLiter(s) (50 mL/Hr) IV Continuous <Continuous>  dextrose 5%. 1000 milliLiter(s) (100 mL/Hr) IV Continuous <Continuous>  dextrose 50% Injectable 25 Gram(s) IV Push once  dextrose 50% Injectable 12.5 Gram(s) IV Push once  dextrose 50% Injectable 25 Gram(s) IV Push once  enoxaparin Injectable 40 milliGRAM(s) SubCutaneous daily  fentaNYL   Infusion. 0.587 MICROgram(s)/kG/Hr (2.25 mL/Hr) IV Continuous <Continuous>  glucagon  Injectable 1 milliGRAM(s) IntraMuscular once  influenza  Vaccine (HIGH DOSE) 0.7 milliLiter(s) IntraMuscular once  insulin lispro (ADMELOG) corrective regimen sliding scale   SubCutaneous every 6 hours  lactulose Syrup 20 Gram(s) Oral two times a day  norepinephrine Infusion 0.05 MICROgram(s)/kG/Min (1.8 mL/Hr) IV Continuous <Continuous>  pantoprazole  Injectable 40 milliGRAM(s) IV Push daily  piperacillin/tazobactam IVPB.. 2.25 Gram(s) IV Intermittent every 6 hours  polyethylene glycol 3350 17 Gram(s) Oral every 12 hours  vitamin A &amp; D Ointment 1 Application(s) Topical daily    MEDICATIONS  (PRN):  lactulose Retention Enema 200 Gram(s) Rectal two times a day PRN constipation  morphine  - Injectable 2 milliGRAM(s) IV Push three times a day PRN Severe Pain (7 - 10)      PHYSICAL EXAM:    GENERAL: NAD    HEENT: NCAT, trach no bleeding no hematoma     CHEST/LUNGS: CTAB    HEART: RRR,  No murmurs, rubs, or gallops    ABDOMEN: SNTND +BS, g tube in place to gravity     EXTREMITIES:  FROM, No clubbing, cyanosis, or edema, palpable pulse    NEURO: No focal neurological deficits    SKIN: No rashes or lesions    INCISION/WOUNDS:                          8.1    16.28 )-----------( 326      ( 2022 04:35 )             24.3        CBC Full  -  ( 2022 04:35 )  WBC Count : 16.28 K/uL  RBC Count : 2.69 M/uL  Hemoglobin : 8.1 g/dL  Hematocrit : 24.3 %  Platelet Count - Automated : 326 K/uL  Mean Cell Volume : 90.3 fL  Mean Cell Hemoglobin : 30.1 pg  Mean Cell Hemoglobin Concentration : 33.3 g/dL  Auto Neutrophil # : 14.85 K/uL  Auto Lymphocyte # : 0.48 K/uL  Auto Monocyte # : 0.60 K/uL  Auto Eosinophil # : 0.01 K/uL  Auto Basophil # : 0.02 K/uL  Auto Neutrophil % : 91.2 %  Auto Lymphocyte % : 2.9 %  Auto Monocyte % : 3.7 %  Auto Eosinophil % : 0.1 %  Auto Basophil % : 0.1 %               133   |  97    |  19                 Ca: 7.0    BMP:   ----------------------------< 109    M.1   (22 @ 04:35)             4.0    |  26    | <0.5               Ph: x        LFT:     TPro: 4.7 / Alb: 1.9 / TBili: 1.3 / DBili: x / AST: 31 / ALT: 83 / AlkPhos: 130   (22 @ 04:35)    LIVER FUNCTIONS - ( 2022 04:35 )  Alb: 1.9 g/dL / Pro: 4.7 g/dL / ALK PHOS: 130 U/L / ALT: 83 U/L / AST: 31 U/L / GGT: x           PT/INR - ( 2022 19:30 )   PT: 14.10 sec;   INR: 1.23 ratio         PTT - ( 2022 19:30 )  PTT:30.0 sec

## 2022-02-24 NOTE — PROCEDURE NOTE - NSPROCNAME_GEN_A_CORE
Central Line Insertion
Arterial Puncture/Cannulation
General
Arterial Puncture/Cannulation
Debridement

## 2022-02-25 LAB
ALBUMIN SERPL ELPH-MCNC: 1.9 G/DL — LOW (ref 3.5–5.2)
ALP SERPL-CCNC: 313 U/L — HIGH (ref 30–115)
ALT FLD-CCNC: 80 U/L — HIGH (ref 0–41)
ANION GAP SERPL CALC-SCNC: 10 MMOL/L — SIGNIFICANT CHANGE UP (ref 7–14)
AST SERPL-CCNC: 43 U/L — HIGH (ref 0–41)
BILIRUB SERPL-MCNC: 1.1 MG/DL — SIGNIFICANT CHANGE UP (ref 0.2–1.2)
BUN SERPL-MCNC: 16 MG/DL — SIGNIFICANT CHANGE UP (ref 10–20)
CALCIUM SERPL-MCNC: 7.2 MG/DL — LOW (ref 8.5–10.1)
CHLORIDE SERPL-SCNC: 97 MMOL/L — LOW (ref 98–110)
CO2 SERPL-SCNC: 24 MMOL/L — SIGNIFICANT CHANGE UP (ref 17–32)
CREAT SERPL-MCNC: <0.5 MG/DL — LOW (ref 0.7–1.5)
GLUCOSE BLDC GLUCOMTR-MCNC: 117 MG/DL — HIGH (ref 70–99)
GLUCOSE BLDC GLUCOMTR-MCNC: 140 MG/DL — HIGH (ref 70–99)
GLUCOSE BLDC GLUCOMTR-MCNC: 145 MG/DL — HIGH (ref 70–99)
GLUCOSE BLDC GLUCOMTR-MCNC: 194 MG/DL — HIGH (ref 70–99)
GLUCOSE BLDC GLUCOMTR-MCNC: 204 MG/DL — HIGH (ref 70–99)
GLUCOSE SERPL-MCNC: 200 MG/DL — HIGH (ref 70–99)
HCT VFR BLD CALC: 24.2 % — LOW (ref 37–47)
HGB BLD-MCNC: 7.5 G/DL — LOW (ref 12–16)
MAGNESIUM SERPL-MCNC: 2 MG/DL — SIGNIFICANT CHANGE UP (ref 1.8–2.4)
MCHC RBC-ENTMCNC: 29.2 PG — SIGNIFICANT CHANGE UP (ref 27–31)
MCHC RBC-ENTMCNC: 31 G/DL — LOW (ref 32–37)
MCV RBC AUTO: 94.2 FL — SIGNIFICANT CHANGE UP (ref 81–99)
NRBC # BLD: 0 /100 WBCS — SIGNIFICANT CHANGE UP (ref 0–0)
PLATELET # BLD AUTO: 283 K/UL — SIGNIFICANT CHANGE UP (ref 130–400)
POTASSIUM SERPL-MCNC: 4.4 MMOL/L — SIGNIFICANT CHANGE UP (ref 3.5–5)
POTASSIUM SERPL-SCNC: 4.4 MMOL/L — SIGNIFICANT CHANGE UP (ref 3.5–5)
PROCALCITONIN SERPL-MCNC: 0.53 NG/ML — HIGH (ref 0.02–0.1)
PROT SERPL-MCNC: 4.6 G/DL — LOW (ref 6–8)
RBC # BLD: 2.57 M/UL — LOW (ref 4.2–5.4)
RBC # FLD: 18.6 % — HIGH (ref 11.5–14.5)
SODIUM SERPL-SCNC: 131 MMOL/L — LOW (ref 135–146)
WBC # BLD: 14.34 K/UL — HIGH (ref 4.8–10.8)
WBC # FLD AUTO: 14.34 K/UL — HIGH (ref 4.8–10.8)

## 2022-02-25 PROCEDURE — 99231 SBSQ HOSP IP/OBS SF/LOW 25: CPT

## 2022-02-25 PROCEDURE — 99233 SBSQ HOSP IP/OBS HIGH 50: CPT

## 2022-02-25 PROCEDURE — 71045 X-RAY EXAM CHEST 1 VIEW: CPT | Mod: 26

## 2022-02-25 RX ORDER — HYDRALAZINE HCL 50 MG
10 TABLET ORAL ONCE
Refills: 0 | Status: COMPLETED | OUTPATIENT
Start: 2022-02-25 | End: 2022-02-25

## 2022-02-25 RX ADMIN — PIPERACILLIN AND TAZOBACTAM 200 GRAM(S): 4; .5 INJECTION, POWDER, LYOPHILIZED, FOR SOLUTION INTRAVENOUS at 00:35

## 2022-02-25 RX ADMIN — Medication 1 APPLICATION(S): at 17:27

## 2022-02-25 RX ADMIN — Medication 2: at 08:08

## 2022-02-25 RX ADMIN — PIPERACILLIN AND TAZOBACTAM 200 GRAM(S): 4; .5 INJECTION, POWDER, LYOPHILIZED, FOR SOLUTION INTRAVENOUS at 11:46

## 2022-02-25 RX ADMIN — MORPHINE SULFATE 2 MILLIGRAM(S): 50 CAPSULE, EXTENDED RELEASE ORAL at 09:30

## 2022-02-25 RX ADMIN — CHLORHEXIDINE GLUCONATE 1 APPLICATION(S): 213 SOLUTION TOPICAL at 06:29

## 2022-02-25 RX ADMIN — Medication 10 MILLIGRAM(S): at 13:06

## 2022-02-25 RX ADMIN — MORPHINE SULFATE 2 MILLIGRAM(S): 50 CAPSULE, EXTENDED RELEASE ORAL at 08:42

## 2022-02-25 RX ADMIN — PANTOPRAZOLE SODIUM 40 MILLIGRAM(S): 20 TABLET, DELAYED RELEASE ORAL at 11:47

## 2022-02-25 RX ADMIN — FENTANYL CITRATE 12.5 MICROGRAM(S): 50 INJECTION INTRAVENOUS at 23:12

## 2022-02-25 RX ADMIN — LACTULOSE 20 GRAM(S): 10 SOLUTION ORAL at 17:25

## 2022-02-25 RX ADMIN — Medication 1 APPLICATION(S): at 06:30

## 2022-02-25 RX ADMIN — Medication 1: at 11:44

## 2022-02-25 RX ADMIN — PIPERACILLIN AND TAZOBACTAM 200 GRAM(S): 4; .5 INJECTION, POWDER, LYOPHILIZED, FOR SOLUTION INTRAVENOUS at 23:11

## 2022-02-25 RX ADMIN — Medication 1 APPLICATION(S): at 11:47

## 2022-02-25 RX ADMIN — FENTANYL CITRATE 12.5 MICROGRAM(S): 50 INJECTION INTRAVENOUS at 22:14

## 2022-02-25 RX ADMIN — CHLORHEXIDINE GLUCONATE 15 MILLILITER(S): 213 SOLUTION TOPICAL at 17:25

## 2022-02-25 RX ADMIN — CHLORHEXIDINE GLUCONATE 15 MILLILITER(S): 213 SOLUTION TOPICAL at 06:28

## 2022-02-25 RX ADMIN — PIPERACILLIN AND TAZOBACTAM 200 GRAM(S): 4; .5 INJECTION, POWDER, LYOPHILIZED, FOR SOLUTION INTRAVENOUS at 17:25

## 2022-02-25 RX ADMIN — POLYETHYLENE GLYCOL 3350 17 GRAM(S): 17 POWDER, FOR SOLUTION ORAL at 17:26

## 2022-02-25 RX ADMIN — Medication 40 MILLIGRAM(S): at 09:47

## 2022-02-25 RX ADMIN — ENOXAPARIN SODIUM 40 MILLIGRAM(S): 100 INJECTION SUBCUTANEOUS at 11:46

## 2022-02-25 RX ADMIN — PIPERACILLIN AND TAZOBACTAM 200 GRAM(S): 4; .5 INJECTION, POWDER, LYOPHILIZED, FOR SOLUTION INTRAVENOUS at 06:28

## 2022-02-25 NOTE — PROGRESS NOTE ADULT - ASSESSMENT
ASSESSMENT:  88F PMHX of HTN s/p tracheostomy and GJ tube placement.    PLAN:  - Management per [facility]  - Pain Management  - Strict Ins and Out  - Continue Current Diet Orders, patient tolerating tube feeds, Thoracic Surgery to sign off  - K>4, MG>2, Phos>3    ACCESS/ DEVICES:  [x] Peripheral IV  [x] GJ tube  [x] Gallegos    GREEN TEAM SPECTRA: [id number]

## 2022-02-25 NOTE — PROGRESS NOTE ADULT - SUBJECTIVE AND OBJECTIVE BOX
GENERAL SURGERY PROGRESS NOTE    Patient: ARI MA , 88y ([birth year])Female   MRN: [id number]  [facility]  Visit: 02-09-22 Inpatient  Date: 02-25-22 @ 01:55    Hospital Day #: 17  Post-Op Day #: 2    Procedure/Dx/Injuries : 88F PMHX of HTN s/p tracheostomy and GJ tube placement.    Events of past 24 hours: Patient seen and evaluated at bedside. On Ventilator 30/5, no pressors, tolerating tube feeds    PAST MEDICAL & SURGICAL HISTORY:    Vitals:   T(F): 98.6 (02-25-22 @ 00:00), Max: 98.6 (02-25-22 @ 00:00)  HR: 72 (02-25-22 @ 01:00)  BP: 137/62 (02-25-22 @ 01:00)  RR: 22 (02-24-22 @ 20:12)  SpO2: 100% (02-25-22 @ 01:00)  Mode: AC/ CMV (Assist Control/ Continuous Mandatory Ventilation), RR (machine): 12, TV (machine): 300, FiO2: 30, PEEP: 5, ITime: 1, MAP: 9, PIP: 17    Diet, NPO with Tube Feed:   Tube Feeding Modality: Gastrostomy  replete  Total Volume for 24 Hours (mL): 840  Continuous  Starting Tube Feed Rate mL per Hour: 20  Increase Tube Feed Rate by (mL): 5     Every 4 hours  Until Goal Tube Feed Rate (mL per Hour): 35  Tube Feed Duration (in Hours): 24  Tube Feed Start Time: 16:00  Free Water Flush   Total Volume per Flush (mL): 100   Frequency: Every 4 Hours    Start Time: 16:00  Diet, NPO after Midnight:      NPO Start Date: 22-Feb-2022,   NPO Start Time: 23:59  Except Medications      Fluids:     I & O's:    02-23-22 @ 07:01  -  02-24-22 @ 07:00  --------------------------------------------------------  IN:    Dexmedetomidine: 100.7 mL    IV PiggyBack: 300 mL  Total IN: 400.7 mL    OUT:    Indwelling Catheter - Urethral (mL): 1235 mL    PEGJ (Percutaneous Endoscopic Gastrojejunostomy) Tube (mL): 40 mL  Total OUT: 1275 mL    Total NET: -874.3 mL    Bowel Movement: : [X] YES [] NO  Flatus: : [X] YES [] NO    PHYSICAL EXAM:  General: Ventilated  HEENT: EOMI, Trachea ML, Neck supple  Cardiac: RRR  Respiratory: Ventilated  Abdomen: Soft, non-distended, GJ tube feeds  Vascular: Pulses 2+ throughout, extremities well perfused    MEDICATIONS  (STANDING):  chlorhexidine 0.12% Liquid 15 milliLiter(s) Oral Mucosa every 12 hours  chlorhexidine 4% Liquid 1 Application(s) Topical <User Schedule>  collagenase Ointment 1 Application(s) Topical two times a day  collagenase Ointment 1 Application(s) Topical two times a day  dextrose 40% Gel 15 Gram(s) Oral once  dextrose 5%. 1000 milliLiter(s) (50 mL/Hr) IV Continuous <Continuous>  dextrose 5%. 1000 milliLiter(s) (100 mL/Hr) IV Continuous <Continuous>  dextrose 50% Injectable 25 Gram(s) IV Push once  dextrose 50% Injectable 12.5 Gram(s) IV Push once  dextrose 50% Injectable 25 Gram(s) IV Push once  enoxaparin Injectable 40 milliGRAM(s) SubCutaneous daily  glucagon  Injectable 1 milliGRAM(s) IntraMuscular once  influenza  Vaccine (HIGH DOSE) 0.7 milliLiter(s) IntraMuscular once  insulin lispro (ADMELOG) corrective regimen sliding scale   SubCutaneous every 6 hours  lactulose Syrup 20 Gram(s) Oral two times a day  norepinephrine Infusion 0.05 MICROgram(s)/kG/Min (1.8 mL/Hr) IV Continuous <Continuous>  pantoprazole  Injectable 40 milliGRAM(s) IV Push daily  piperacillin/tazobactam IVPB.. 2.25 Gram(s) IV Intermittent every 6 hours  polyethylene glycol 3350 17 Gram(s) Oral every 12 hours  predniSONE   Tablet 40 milliGRAM(s) Oral daily  vitamin A &amp; D Ointment 1 Application(s) Topical daily    MEDICATIONS  (PRN):  fentaNYL    Injectable 12.5 MICROGram(s) IV Push every 8 hours PRN Moderate Pain (4 - 6)  lactulose Retention Enema 200 Gram(s) Rectal two times a day PRN constipation  morphine  - Injectable 2 milliGRAM(s) IV Push three times a day PRN Severe Pain (7 - 10)      DVT PROPHYLAXIS: enoxaparin Injectable 40 milliGRAM(s) SubCutaneous daily    GI PROPHYLAXIS: pantoprazole  Injectable 40 milliGRAM(s) IV Push daily    ANTICOAGULATION:   ANTIBIOTICS:  piperacillin/tazobactam IVPB.. 2.25 Gram(s)            LAB/STUDIES:  Labs:  CAPILLARY BLOOD GLUCOSE      POCT Blood Glucose.: 145 mg/dL (25 Feb 2022 00:41)  POCT Blood Glucose.: 149 mg/dL (24 Feb 2022 17:06)  POCT Blood Glucose.: 124 mg/dL (24 Feb 2022 12:25)  POCT Blood Glucose.: 108 mg/dL (24 Feb 2022 05:15)                          7.6    19.13 )-----------( 296      ( 24 Feb 2022 05:10 )             23.5         02-24    136  |  101  |  15  ----------------------------<  105<H>  3.3<L>   |  27  |  <0.5<L>      Calcium, Total Serum: 6.9 mg/dL (02-24-22 @ 05:10)      LFTs:             4.6  | 0.9  | 28       ------------------[132     ( 24 Feb 2022 05:10 )  1.7  | x    | 71          Lipase:x      Amylase:x         Blood Gas Arterial, Lactate: 1.40 mmol/L (02-24-22 @ 12:17)  Blood Gas Arterial, Lactate: 1.50 mmol/L (02-23-22 @ 03:40)  Blood Gas Venous - Lactate: 3.10 mmol/L (02-22-22 @ 03:35)    ABG - ( 24 Feb 2022 12:17 )  pH: 7.51  /  pCO2: 35    /  pO2: 121   / HCO3: 28    / Base Excess: 4.9   /  SaO2: 100.0           ABG - ( 23 Feb 2022 03:40 )  pH: 7.57  /  pCO2: 30    /  pO2: 97    / HCO3: 28    / Base Excess: 5.4   /  SaO2: 98.4              Coags:                Culture - Sputum (collected 24 Feb 2022 09:40)  Source: .Sputum Sputum  Gram Stain (24 Feb 2022 23:44):    Few polymorphonuclear leukocytes per low power field    No Squamous epithelial cells per low power field    Few Gram Negative Rods per oil power field    Culture - Blood (collected 23 Feb 2022 04:30)  Source: .Blood Blood-Peripheral  Preliminary Report (24 Feb 2022 23:01):    No growth to date.    IMAGING:  < from: [facility] Duplex Lower Ext Vein Scan, Bilat (02.24.22 @ 08:35) >    Impression:    No evidence of deep venous thrombosis or superficial thrombophlebitis in   the visualized bilateral lower extremities.    < end of copied text >    ACCESS/ DEVICES:  [x] Peripheral IV  [x] GJ tube  [x] Gallegos

## 2022-02-25 NOTE — PROGRESS NOTE ADULT - TIME BILLING
. #Progress Note Handoff  Pending (specify):  derm, burn, dispo  Family discussion: house staff updated pt family  Disposition: dw CM will anticpate 24- 48 hrs for dispo  Pt is being co-managed by Medicine and Critical care/Pulmonary team. Decisions on ventilator settings/changes, drips and ICU level of care aspects of the case has been discussed and approved  with/by  ICU/Pulmonary attending.
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
## 2022-02-25 NOTE — PROGRESS NOTE ADULT - SUBJECTIVE AND OBJECTIVE BOX
----------Daily Progress Note----------    HISTORY OF PRESENT ILLNESS:  Patient is a 88y old Female who presents with a chief complaint of hypoxia (19 Feb 2022 09:16)    Currently admitted to medicine with the primary diagnosis of Acute respiratory failure with hypoxia       Today is hospital day 16d.     INTERVAL HOSPITAL COURSE / OVERNIGHT EVENTS:    Pt trached/PEG. Tube feeds started yesterday, does not appear to be uncomfortable in pain.     Review of Systems: Otherwise unremarkable     <<<<<PAST MEDICAL & SURGICAL HISTORY>>>>>    ALLERGIES  No Known Allergies      Home Medications:        MEDICATIONS  STANDING MEDICATIONS  chlorhexidine 0.12% Liquid 15 milliLiter(s) Oral Mucosa every 12 hours  chlorhexidine 4% Liquid 1 Application(s) Topical <User Schedule>  collagenase Ointment 1 Application(s) Topical two times a day  collagenase Ointment 1 Application(s) Topical two times a day  dextrose 40% Gel 15 Gram(s) Oral once  dextrose 5%. 1000 milliLiter(s) IV Continuous <Continuous>  dextrose 5%. 1000 milliLiter(s) IV Continuous <Continuous>  dextrose 50% Injectable 25 Gram(s) IV Push once  dextrose 50% Injectable 12.5 Gram(s) IV Push once  dextrose 50% Injectable 25 Gram(s) IV Push once  enoxaparin Injectable 40 milliGRAM(s) SubCutaneous daily  glucagon  Injectable 1 milliGRAM(s) IntraMuscular once  influenza  Vaccine (HIGH DOSE) 0.7 milliLiter(s) IntraMuscular once  insulin lispro (ADMELOG) corrective regimen sliding scale   SubCutaneous every 6 hours  lactulose Syrup 20 Gram(s) Oral two times a day  norepinephrine Infusion 0.05 MICROgram(s)/kG/Min IV Continuous <Continuous>  pantoprazole  Injectable 40 milliGRAM(s) IV Push daily  piperacillin/tazobactam IVPB.. 2.25 Gram(s) IV Intermittent every 6 hours  polyethylene glycol 3350 17 Gram(s) Oral every 12 hours  predniSONE   Tablet 40 milliGRAM(s) Oral daily  vitamin A &amp; D Ointment 1 Application(s) Topical daily    PRN MEDICATIONS  fentaNYL    Injectable 12.5 MICROGram(s) IV Push every 8 hours PRN  lactulose Retention Enema 200 Gram(s) Rectal two times a day PRN  morphine  - Injectable 2 milliGRAM(s) IV Push three times a day PRN    VITALS:  T(F): 97.8  HR: 80  BP: 167/99  RR: 22  SpO2: 99%    <<<<<LABS>>>>>                        7.5    14.34 )-----------( 283      ( 25 Feb 2022 05:50 )             24.2     02-25    131<L>  |  97<L>  |  16  ----------------------------<  200<H>  4.4   |  24  |  <0.5<L>    Ca    7.2<L>      25 Feb 2022 05:50  Mg     2.0     02-25    TPro  4.6<L>  /  Alb  1.9<L>  /  TBili  1.1  /  DBili  x   /  AST  43<H>  /  ALT  80<H>  /  AlkPhos  313<H>  02-25        ABG - ( 24 Feb 2022 12:17 )  pH, Arterial: 7.51  pH, Blood: x     /  pCO2: 35    /  pO2: 121   / HCO3: 28    / Base Excess: 4.9   /  SaO2: 100.0                 Culture - Sputum (collected 24 Feb 2022 09:40)  Source: .Sputum Sputum  Gram Stain (24 Feb 2022 23:44):    Few polymorphonuclear leukocytes per low power field    No Squamous epithelial cells per low power field    Few Gram Negative Rods per oil power field    Culture - Blood (collected 23 Feb 2022 04:30)  Source: .Blood Blood-Peripheral  Preliminary Report (24 Feb 2022 23:01):    No growth to date.    [id number]        <<<<<RADIOLOGY>>>>>    < from: [facility] Duplex Lower Ext Vein Scan, Nadine (02.24.22 @ 08:35) >  PROCEDURE DATE:  02/24/2022          INTERPRETATION:  CLINICAL INFORMATION: The patient is a 88-year-old   female with bilateral leg swelling. A venous duplex examination was   performed to evaluate the patient for deep venous thrombosis of the lower   extremities.    The common femoral, great saphenous and femoral veins were visualized   bilaterally with no evidence of deep venous thrombosis. The bilateral   poplitealand short saphenous veins were not visualized due to open   wounds.    All veins were fully compressible.  There was presence of spontaneous   flow, augmentation with distal compression and phasicity.    The anterior tibial veins were  patent    The posterior tibial veins were  patent    The peroneal veins were patent.    Impression:    No evidence of deep venous thrombosis or superficial thrombophlebitis in   the visualized bilateral lower extremities.    < end of copied text >      <<<<<PHYSICAL EXAM>>>>>  GENERAL: ill-appearing; trach/PEG tube intact  PULMONARY: Clear to auscultation bilaterally. Intubated  CARDIOVASCULAR: Regular rate and rhythm, S1-S2, no murmurs  GASTROINTESTINAL: PEG tube intact  SKIN/EXTREMITIES: bullae on hands and legs b/l  NEUROLOGIC: trached      ----------------------------------------------------------------------------------------------------------------------------------------------------------------------------------------------- ----------Daily Progress Note----------    HISTORY OF PRESENT ILLNESS:  Patient is a 88y old Female who presents with a chief complaint of hypoxia (19 Feb 2022 09:16)    Currently admitted to medicine with the primary diagnosis of Acute respiratory failure with hypoxia       Today is hospital day 16d.     INTERVAL HOSPITAL COURSE / OVERNIGHT EVENTS:    Pt trached/PEG. Tube feeds started yesterday, does not appear to be uncomfortable in pain.     Review of Systems: Otherwise unremarkable     Attending note: Pt seen and examined at bedside. trach peg vent.     <<<<<PAST MEDICAL & SURGICAL HISTORY>>>>>    ALLERGIES  No Known Allergies      Home Medications:        MEDICATIONS  STANDING MEDICATIONS  chlorhexidine 0.12% Liquid 15 milliLiter(s) Oral Mucosa every 12 hours  chlorhexidine 4% Liquid 1 Application(s) Topical <User Schedule>  collagenase Ointment 1 Application(s) Topical two times a day  collagenase Ointment 1 Application(s) Topical two times a day  dextrose 40% Gel 15 Gram(s) Oral once  dextrose 5%. 1000 milliLiter(s) IV Continuous <Continuous>  dextrose 5%. 1000 milliLiter(s) IV Continuous <Continuous>  dextrose 50% Injectable 25 Gram(s) IV Push once  dextrose 50% Injectable 12.5 Gram(s) IV Push once  dextrose 50% Injectable 25 Gram(s) IV Push once  enoxaparin Injectable 40 milliGRAM(s) SubCutaneous daily  glucagon  Injectable 1 milliGRAM(s) IntraMuscular once  influenza  Vaccine (HIGH DOSE) 0.7 milliLiter(s) IntraMuscular once  insulin lispro (ADMELOG) corrective regimen sliding scale   SubCutaneous every 6 hours  lactulose Syrup 20 Gram(s) Oral two times a day  norepinephrine Infusion 0.05 MICROgram(s)/kG/Min IV Continuous <Continuous>  pantoprazole  Injectable 40 milliGRAM(s) IV Push daily  piperacillin/tazobactam IVPB.. 2.25 Gram(s) IV Intermittent every 6 hours  polyethylene glycol 3350 17 Gram(s) Oral every 12 hours  predniSONE   Tablet 40 milliGRAM(s) Oral daily  vitamin A &amp; D Ointment 1 Application(s) Topical daily    PRN MEDICATIONS  fentaNYL    Injectable 12.5 MICROGram(s) IV Push every 8 hours PRN  lactulose Retention Enema 200 Gram(s) Rectal two times a day PRN  morphine  - Injectable 2 milliGRAM(s) IV Push three times a day PRN    VITALS:  T(F): 97.8  HR: 80  BP: 167/99  RR: 22  SpO2: 99%    <<<<<LABS>>>>>                        7.5    14.34 )-----------( 283      ( 25 Feb 2022 05:50 )             24.2     02-25    131<L>  |  97<L>  |  16  ----------------------------<  200<H>  4.4   |  24  |  <0.5<L>    Ca    7.2<L>      25 Feb 2022 05:50  Mg     2.0     02-25    TPro  4.6<L>  /  Alb  1.9<L>  /  TBili  1.1  /  DBili  x   /  AST  43<H>  /  ALT  80<H>  /  AlkPhos  313<H>  02-25        ABG - ( 24 Feb 2022 12:17 )  pH, Arterial: 7.51  pH, Blood: x     /  pCO2: 35    /  pO2: 121   / HCO3: 28    / Base Excess: 4.9   /  SaO2: 100.0                 Culture - Sputum (collected 24 Feb 2022 09:40)  Source: .Sputum Sputum  Gram Stain (24 Feb 2022 23:44):    Few polymorphonuclear leukocytes per low power field    No Squamous epithelial cells per low power field    Few Gram Negative Rods per oil power field    Culture - Blood (collected 23 Feb 2022 04:30)  Source: .Blood Blood-Peripheral  Preliminary Report (24 Feb 2022 23:01):    No growth to date.    [id number]        <<<<<RADIOLOGY>>>>>    < from: [facility] Duplex Lower Ext Vein Scan, Biljoshua (02.24.22 @ 08:35) >  PROCEDURE DATE:  02/24/2022          INTERPRETATION:  CLINICAL INFORMATION: The patient is a 88-year-old   female with bilateral leg swelling. A venous duplex examination was   performed to evaluate the patient for deep venous thrombosis of the lower   extremities.    The common femoral, great saphenous and femoral veins were visualized   bilaterally with no evidence of deep venous thrombosis. The bilateral   poplitealand short saphenous veins were not visualized due to open   wounds.    All veins were fully compressible.  There was presence of spontaneous   flow, augmentation with distal compression and phasicity.    The anterior tibial veins were  patent    The posterior tibial veins were  patent    The peroneal veins were patent.    Impression:    No evidence of deep venous thrombosis or superficial thrombophlebitis in   the visualized bilateral lower extremities.    < end of copied text >          <<<<<PHYSICAL EXAM>>>>>  GENERAL: ill-appearing; trach/PEG tube intact  PULMONARY: Clear to auscultation bilaterally. Intubated  CARDIOVASCULAR: Regular rate and rhythm, S1-S2, no murmurs  GASTROINTESTINAL: PEG tube intact  SKIN/EXTREMITIES: bullae on hands and legs b/l  NEUROLOGIC: trached      -----------------------------------------------------------------------------------------------------------------------------------------------------------------------------------------------

## 2022-02-25 NOTE — PROGRESS NOTE ADULT - SUBJECTIVE AND OBJECTIVE BOX
ARI MA  88y, Female    All available historical data reviewed    OVERNIGHT EVENTS:  no fevers  fio2 30%  does not follow commands  off pressor    ROS:  unable to obtain history secondary to patient's mental status and/or sedation     VITALS:  T(F): 97.8, Max: 98.6 (02-25-22 @ 00:00)  HR: 80  BP: 167/99  RR: 22Vital Signs Last 24 Hrs  T(C): 36.6 (25 Feb 2022 06:00), Max: 37 (25 Feb 2022 00:00)  T(F): 97.8 (25 Feb 2022 06:00), Max: 98.6 (25 Feb 2022 00:00)  HR: 80 (25 Feb 2022 07:00) (48 - 80)  BP: 167/99 (25 Feb 2022 07:00) (94/51 - 177/76)  BP(mean): 125 (25 Feb 2022 07:00) (65 - 125)  RR: 22 (24 Feb 2022 20:12) (16 - 22)  SpO2: 99% (25 Feb 2022 07:00) (90% - 100%)    TESTS & MEASUREMENTS:                        7.5    14.34 )-----------( 283      ( 25 Feb 2022 05:50 )             24.2     02-25    131<L>  |  97<L>  |  16  ----------------------------<  200<H>  4.4   |  24  |  <0.5<L>    Ca    7.2<L>      25 Feb 2022 05:50  Mg     2.0     02-25    TPro  4.6<L>  /  Alb  1.9<L>  /  TBili  1.1  /  DBili  x   /  AST  43<H>  /  ALT  80<H>  /  AlkPhos  313<H>  02-25    LIVER FUNCTIONS - ( 25 Feb 2022 05:50 )  Alb: 1.9 g/dL / Pro: 4.6 g/dL / ALK PHOS: 313 U/L / ALT: 80 U/L / AST: 43 U/L / GGT: x             Culture - Sputum (collected 02-24-22 @ 09:40)  Source: .Sputum Sputum  Gram Stain (02-24-22 @ 23:44):    Few polymorphonuclear leukocytes per low power field    No Squamous epithelial cells per low power field    Few Gram Negative Rods per oil power field    Culture - Blood (collected 02-23-22 @ 04:30)  Source: .Blood Blood-Peripheral  Preliminary Report (02-24-22 @ 23:01):    No growth to date.            RADIOLOGY & ADDITIONAL TESTS:  Personal review of radiological diagnostics performed  Echo and EKG results noted when applicable.     MEDICATIONS:  chlorhexidine 0.12% Liquid 15 milliLiter(s) Oral Mucosa every 12 hours  chlorhexidine 4% Liquid 1 Application(s) Topical <User Schedule>  collagenase Ointment 1 Application(s) Topical two times a day  collagenase Ointment 1 Application(s) Topical two times a day  dextrose 40% Gel 15 Gram(s) Oral once  dextrose 5%. 1000 milliLiter(s) IV Continuous <Continuous>  dextrose 5%. 1000 milliLiter(s) IV Continuous <Continuous>  dextrose 50% Injectable 25 Gram(s) IV Push once  dextrose 50% Injectable 12.5 Gram(s) IV Push once  dextrose 50% Injectable 25 Gram(s) IV Push once  enoxaparin Injectable 40 milliGRAM(s) SubCutaneous daily  fentaNYL    Injectable 12.5 MICROGram(s) IV Push every 8 hours PRN  glucagon  Injectable 1 milliGRAM(s) IntraMuscular once  influenza  Vaccine (HIGH DOSE) 0.7 milliLiter(s) IntraMuscular once  insulin lispro (ADMELOG) corrective regimen sliding scale   SubCutaneous every 6 hours  lactulose Retention Enema 200 Gram(s) Rectal two times a day PRN  lactulose Syrup 20 Gram(s) Oral two times a day  morphine  - Injectable 2 milliGRAM(s) IV Push three times a day PRN  norepinephrine Infusion 0.05 MICROgram(s)/kG/Min IV Continuous <Continuous>  pantoprazole  Injectable 40 milliGRAM(s) IV Push daily  piperacillin/tazobactam IVPB.. 2.25 Gram(s) IV Intermittent every 6 hours  polyethylene glycol 3350 17 Gram(s) Oral every 12 hours  predniSONE   Tablet 40 milliGRAM(s) Oral daily  vitamin A &amp; D Ointment 1 Application(s) Topical daily      ANTIBIOTICS:  piperacillin/tazobactam IVPB.. 2.25 Gram(s) IV Intermittent every 6 hours

## 2022-02-25 NOTE — PROGRESS NOTE ADULT - ASSESSMENT
· Assessment	  89 y/o F w/ PMH/o HTN (as per son at bedside) presenting to the hospital BIBA after experiencing respiratory distress. According to the patients family, the patient was being fed when she began choking and had difficulty breathing. Pt was bag masked by EMS and transported to the hospital. According to the family, the patient began to experience diffuse bullae across her entire body as well as ulcerations in her mouth for the past month. The family denied noticing fever or chills.  In the ED, patient was tachypneic and hypoxic,  intubated for airway protection. Pt was found to hypothermic, started on warming blanket. Pt started on IV levophed. S/p IV vanc/ cefepime.    IMPRESSION;  Diffuse bullae : s/p Bx 2/9 > bullous pemphigoid  MSOF  JILL > resolved  Aspiration with probable bacterial PNA RLL  2/24 Sputum GNR  2/23 BCX NG  2/9 Sputum > P aeruginosa > colonizer ?  2/8 BCx NG  2/9 UCx NG  2/18 CT : no PE    COVID-19 NG 1/9  COVID-19 positive 1/15  Ddimers 3908  Ferritin 521  Procal 1.14    2/23 S/p trach  Pt was in the early viral replicative phase based on the timeline/onset of symptoms.     s/p Dexamethason      RECOMMENDATIONS;  Zosyn 2.250 gm iv q6h for 14 days  Anticoagulation as per team.   Off loading to prevent pressure sores and preventive measures to avoid aspiration   If any questions , please call [phone] or send a message on [facility] teams  Please update ID in real time with any pertinent new laboratory /microbiological/radiographically findings or a change in the clinical characteristics   Prognosis is extremely poor given her advanced age and poor nutritional status

## 2022-02-26 LAB
-  AMIKACIN: SIGNIFICANT CHANGE UP
-  AZTREONAM: SIGNIFICANT CHANGE UP
-  CEFEPIME: SIGNIFICANT CHANGE UP
-  CEFTAZIDIME: SIGNIFICANT CHANGE UP
-  CIPROFLOXACIN: SIGNIFICANT CHANGE UP
-  GENTAMICIN: SIGNIFICANT CHANGE UP
-  IMIPENEM: SIGNIFICANT CHANGE UP
-  LEVOFLOXACIN: SIGNIFICANT CHANGE UP
-  MEROPENEM: SIGNIFICANT CHANGE UP
-  PIPERACILLIN/TAZOBACTAM: SIGNIFICANT CHANGE UP
-  TOBRAMYCIN: SIGNIFICANT CHANGE UP
ALBUMIN SERPL ELPH-MCNC: 2.1 G/DL — LOW (ref 3.5–5.2)
ALP SERPL-CCNC: 296 U/L — HIGH (ref 30–115)
ALT FLD-CCNC: 80 U/L — HIGH (ref 0–41)
ANION GAP SERPL CALC-SCNC: 9 MMOL/L — SIGNIFICANT CHANGE UP (ref 7–14)
AST SERPL-CCNC: 39 U/L — SIGNIFICANT CHANGE UP (ref 0–41)
BILIRUB SERPL-MCNC: 1 MG/DL — SIGNIFICANT CHANGE UP (ref 0.2–1.2)
BUN SERPL-MCNC: 18 MG/DL — SIGNIFICANT CHANGE UP (ref 10–20)
CALCIUM SERPL-MCNC: 7.2 MG/DL — LOW (ref 8.5–10.1)
CHLORIDE SERPL-SCNC: 98 MMOL/L — SIGNIFICANT CHANGE UP (ref 98–110)
CO2 SERPL-SCNC: 25 MMOL/L — SIGNIFICANT CHANGE UP (ref 17–32)
CREAT SERPL-MCNC: <0.5 MG/DL — LOW (ref 0.7–1.5)
CULTURE RESULTS: SIGNIFICANT CHANGE UP
GLUCOSE BLDC GLUCOMTR-MCNC: 131 MG/DL — HIGH (ref 70–99)
GLUCOSE BLDC GLUCOMTR-MCNC: 165 MG/DL — HIGH (ref 70–99)
GLUCOSE BLDC GLUCOMTR-MCNC: 168 MG/DL — HIGH (ref 70–99)
GLUCOSE BLDC GLUCOMTR-MCNC: 191 MG/DL — HIGH (ref 70–99)
GLUCOSE SERPL-MCNC: 143 MG/DL — HIGH (ref 70–99)
HCT VFR BLD CALC: 23.8 % — LOW (ref 37–47)
HGB BLD-MCNC: 7.6 G/DL — LOW (ref 12–16)
MAGNESIUM SERPL-MCNC: 1.9 MG/DL — SIGNIFICANT CHANGE UP (ref 1.8–2.4)
MCHC RBC-ENTMCNC: 29.6 PG — SIGNIFICANT CHANGE UP (ref 27–31)
MCHC RBC-ENTMCNC: 31.9 G/DL — LOW (ref 32–37)
MCV RBC AUTO: 92.6 FL — SIGNIFICANT CHANGE UP (ref 81–99)
METHOD TYPE: SIGNIFICANT CHANGE UP
NRBC # BLD: 0 /100 WBCS — SIGNIFICANT CHANGE UP (ref 0–0)
ORGANISM # SPEC MICROSCOPIC CNT: SIGNIFICANT CHANGE UP
ORGANISM # SPEC MICROSCOPIC CNT: SIGNIFICANT CHANGE UP
PLATELET # BLD AUTO: 261 K/UL — SIGNIFICANT CHANGE UP (ref 130–400)
POTASSIUM SERPL-MCNC: 3.8 MMOL/L — SIGNIFICANT CHANGE UP (ref 3.5–5)
POTASSIUM SERPL-SCNC: 3.8 MMOL/L — SIGNIFICANT CHANGE UP (ref 3.5–5)
PROT SERPL-MCNC: 4.8 G/DL — LOW (ref 6–8)
RBC # BLD: 2.57 M/UL — LOW (ref 4.2–5.4)
RBC # FLD: 18.4 % — HIGH (ref 11.5–14.5)
SODIUM SERPL-SCNC: 132 MMOL/L — LOW (ref 135–146)
SPECIMEN SOURCE: SIGNIFICANT CHANGE UP
WBC # BLD: 9.98 K/UL — SIGNIFICANT CHANGE UP (ref 4.8–10.8)
WBC # FLD AUTO: 9.98 K/UL — SIGNIFICANT CHANGE UP (ref 4.8–10.8)

## 2022-02-26 PROCEDURE — 99231 SBSQ HOSP IP/OBS SF/LOW 25: CPT

## 2022-02-26 PROCEDURE — 99233 SBSQ HOSP IP/OBS HIGH 50: CPT

## 2022-02-26 PROCEDURE — 99232 SBSQ HOSP IP/OBS MODERATE 35: CPT

## 2022-02-26 RX ORDER — AMLODIPINE BESYLATE 2.5 MG/1
5 TABLET ORAL ONCE
Refills: 0 | Status: COMPLETED | OUTPATIENT
Start: 2022-02-26 | End: 2022-02-26

## 2022-02-26 RX ORDER — HYDROCHLOROTHIAZIDE 25 MG
12.5 TABLET ORAL DAILY
Refills: 0 | Status: DISCONTINUED | OUTPATIENT
Start: 2022-02-26 | End: 2022-03-01

## 2022-02-26 RX ORDER — MIDAZOLAM HYDROCHLORIDE 1 MG/ML
2 INJECTION, SOLUTION INTRAMUSCULAR; INTRAVENOUS ONCE
Refills: 0 | Status: DISCONTINUED | OUTPATIENT
Start: 2022-02-26 | End: 2022-02-26

## 2022-02-26 RX ORDER — LABETALOL HCL 100 MG
10 TABLET ORAL ONCE
Refills: 0 | Status: COMPLETED | OUTPATIENT
Start: 2022-02-26 | End: 2022-02-26

## 2022-02-26 RX ADMIN — Medication 10 MILLIGRAM(S): at 05:11

## 2022-02-26 RX ADMIN — Medication 1: at 06:37

## 2022-02-26 RX ADMIN — LACTULOSE 20 GRAM(S): 10 SOLUTION ORAL at 05:05

## 2022-02-26 RX ADMIN — CHLORHEXIDINE GLUCONATE 1 APPLICATION(S): 213 SOLUTION TOPICAL at 05:07

## 2022-02-26 RX ADMIN — CHLORHEXIDINE GLUCONATE 15 MILLILITER(S): 213 SOLUTION TOPICAL at 05:07

## 2022-02-26 RX ADMIN — Medication 1 APPLICATION(S): at 05:07

## 2022-02-26 RX ADMIN — Medication 1: at 17:14

## 2022-02-26 RX ADMIN — Medication 1 APPLICATION(S): at 12:51

## 2022-02-26 RX ADMIN — MIDAZOLAM HYDROCHLORIDE 2 MILLIGRAM(S): 1 INJECTION, SOLUTION INTRAMUSCULAR; INTRAVENOUS at 11:38

## 2022-02-26 RX ADMIN — Medication 40 MILLIGRAM(S): at 05:06

## 2022-02-26 RX ADMIN — PANTOPRAZOLE SODIUM 40 MILLIGRAM(S): 20 TABLET, DELAYED RELEASE ORAL at 12:50

## 2022-02-26 RX ADMIN — Medication 12.5 MILLIGRAM(S): at 11:08

## 2022-02-26 RX ADMIN — PIPERACILLIN AND TAZOBACTAM 200 GRAM(S): 4; .5 INJECTION, POWDER, LYOPHILIZED, FOR SOLUTION INTRAVENOUS at 17:13

## 2022-02-26 RX ADMIN — MORPHINE SULFATE 2 MILLIGRAM(S): 50 CAPSULE, EXTENDED RELEASE ORAL at 03:17

## 2022-02-26 RX ADMIN — PIPERACILLIN AND TAZOBACTAM 200 GRAM(S): 4; .5 INJECTION, POWDER, LYOPHILIZED, FOR SOLUTION INTRAVENOUS at 23:15

## 2022-02-26 RX ADMIN — MORPHINE SULFATE 2 MILLIGRAM(S): 50 CAPSULE, EXTENDED RELEASE ORAL at 04:15

## 2022-02-26 RX ADMIN — Medication 1: at 13:16

## 2022-02-26 RX ADMIN — CHLORHEXIDINE GLUCONATE 15 MILLILITER(S): 213 SOLUTION TOPICAL at 17:27

## 2022-02-26 RX ADMIN — MORPHINE SULFATE 2 MILLIGRAM(S): 50 CAPSULE, EXTENDED RELEASE ORAL at 11:23

## 2022-02-26 RX ADMIN — PIPERACILLIN AND TAZOBACTAM 200 GRAM(S): 4; .5 INJECTION, POWDER, LYOPHILIZED, FOR SOLUTION INTRAVENOUS at 12:55

## 2022-02-26 RX ADMIN — PIPERACILLIN AND TAZOBACTAM 200 GRAM(S): 4; .5 INJECTION, POWDER, LYOPHILIZED, FOR SOLUTION INTRAVENOUS at 05:18

## 2022-02-26 RX ADMIN — Medication 1 APPLICATION(S): at 17:15

## 2022-02-26 RX ADMIN — ENOXAPARIN SODIUM 40 MILLIGRAM(S): 100 INJECTION SUBCUTANEOUS at 12:49

## 2022-02-26 RX ADMIN — MORPHINE SULFATE 2 MILLIGRAM(S): 50 CAPSULE, EXTENDED RELEASE ORAL at 12:00

## 2022-02-26 RX ADMIN — LACTULOSE 20 GRAM(S): 10 SOLUTION ORAL at 17:27

## 2022-02-26 RX ADMIN — AMLODIPINE BESYLATE 5 MILLIGRAM(S): 2.5 TABLET ORAL at 03:32

## 2022-02-26 RX ADMIN — POLYETHYLENE GLYCOL 3350 17 GRAM(S): 17 POWDER, FOR SOLUTION ORAL at 17:16

## 2022-02-26 RX ADMIN — Medication 1 APPLICATION(S): at 17:14

## 2022-02-26 RX ADMIN — POLYETHYLENE GLYCOL 3350 17 GRAM(S): 17 POWDER, FOR SOLUTION ORAL at 05:06
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
## 2022-02-26 NOTE — PROGRESS NOTE ADULT - SUBJECTIVE AND OBJECTIVE BOX
Pt seen and examined at bedside. Vented and sedated.  No overnight events    Mode: AC/ CMV (Assist Control/ Continuous Mandatory Ventilation)  RR (machine): 12  TV (machine): 300  FiO2: 30  PEEP: 5  ITime: 1  MAP: 8  PIP: 25    ABG - ( 24 Feb 2022 12:17 )  pH: 7.51  /  pCO2: 35    /  pO2: 121   / HCO3: 28    / Base Excess: 4.9   /  SaO2: 100.0         Lines/Tubes:   Drips/sedation:   Pressors:   Antibiotics:     PAST MEDICAL & SURGICAL HISTORY:      VITAL SIGNS (Last 24 hrs):  T(C): 36.2 (02-26-22 @ 08:00), Max: 36.4 (02-25-22 @ 12:00)  HR: 64 (02-26-22 @ 10:34) (64 - 81)  BP: 159/75 (02-26-22 @ 09:00) (150/61 - 193/90)  RR: --  SpO2: 100% (02-26-22 @ 10:34) (95% - 100%)  Wt(kg): --  Daily     Daily     I&O's Summary    25 Feb 2022 07:01  -  26 Feb 2022 07:00  --------------------------------------------------------  IN: 1470 mL / OUT: 995 mL / NET: 475 mL    26 Feb 2022 07:01  -  26 Feb 2022 11:32  --------------------------------------------------------  IN: 100 mL / OUT: 0 mL / NET: 100 mL        PHYSICAL EXAM:  GENERAL: NAD, Vent trach peg  HEAD:  Atraumatic, Normocephalic  EYES: EOMI, PERRLA, conjunctiva and sclera clear  NECK: Supple, No JVD  CHEST/LUNG: Clear to auscultation bilaterally; No wheeze  HEART: Regular rate and rhythm; No murmurs, rubs, or gallops  ABDOMEN: Soft, Nontender, Nondistended; Bowel sounds present, peg  EXTREMITIES:  2+ Peripheral Pulses, No clubbing, cyanosis, or edema, legs wrapped due to wounds  PSYCH: trach peg  NEUROLOGY: non-focal  SKIN: No rashes or lesions    Labs Reviewed  Spoke to patient in regards to abnormal labs.    CBC Full  -  ( 26 Feb 2022 04:30 )  WBC Count : 9.98 K/uL  Hemoglobin : 7.6 g/dL  Hematocrit : 23.8 %  Platelet Count - Automated : 261 K/uL  Mean Cell Volume : 92.6 fL  Mean Cell Hemoglobin : 29.6 pg  Mean Cell Hemoglobin Concentration : 31.9 g/dL  Auto Neutrophil # : x  Auto Lymphocyte # : x  Auto Monocyte # : x  Auto Eosinophil # : x  Auto Basophil # : x  Auto Neutrophil % : x  Auto Lymphocyte % : x  Auto Monocyte % : x  Auto Eosinophil % : x  Auto Basophil % : x    BMP:    02-26 @ 04:30    Blood Urea Nitrogen - 18  Calcium - 7.2  Carbond Dioxide - 25  Chloride - 98  Creatinine - <0.5  Glucose - 143  Potassium - 3.8  Sodium - 132      Hemoglobin A1c -   PT/INR - ( 22 Feb 2022 19:30 )   PT: 14.10 sec;   INR: 1.23 ratio         PTT - ( 22 Feb 2022 19:30 )  PTT:30.0 sec  Urine Culture:  02-24 @ 09:40 Urine culture: --    Culture Results:   Numerous Pseudomonas aeruginosa  Normal Respiratory Candida absent  Method Type: --  Organism: --  Organism Identification: --  Specimen Source: .Sputum Sputum  02-23 @ 04:30 Urine culture: --    Culture Results:   No growth to date.  Method Type: --  Organism: --  Organism Identification: --  Specimen Source: .Blood Blood-Peripheral        COVID Labs  CRP:  116 (02-16-22)    Procalcitonin, Serum: 0.53 ng/mL (02-24-22 @ 18:40)  Procalcitonin, Serum: 0.35 ng/mL (02-20-22 @ 05:30)  Procalcitonin, Serum: 1.14 ng/mL (02-16-22 @ 07:40)    D-Dimer:  881 ng/mL DDU (02-24-22 @ 18:40)  2123 ng/mL DDU (02-20-22 @ 05:30)    Ferritin, Serum: 521 ng/mL (02-17-22 @ 04:10)          Imaging reviewed independently and reviewed read  < from: Xray Chest 1 View- PORTABLE-Routine (Xray Chest 1 View- PORTABLE-Routine in AM.) (02.25.22 @ 05:42) >  IMPRESSION:    No interval change since one day earlier.    < end of copied text >    < from: [facility] Duplex Lower Ext Vein Scan, Bilat (02.24.22 @ 08:35) >  Impression:    No evidence of deep venous thrombosis or superficial thrombophlebitis in   the visualized bilateral lower extremities.    < end of copied text >    MEDICATIONS  (STANDING):  chlorhexidine 0.12% Liquid 15 milliLiter(s) Oral Mucosa every 12 hours  chlorhexidine 4% Liquid 1 Application(s) Topical <User Schedule>  collagenase Ointment 1 Application(s) Topical two times a day  collagenase Ointment 1 Application(s) Topical two times a day  dextrose 40% Gel 15 Gram(s) Oral once  dextrose 5%. 1000 milliLiter(s) (50 mL/Hr) IV Continuous <Continuous>  dextrose 5%. 1000 milliLiter(s) (100 mL/Hr) IV Continuous <Continuous>  dextrose 50% Injectable 25 Gram(s) IV Push once  dextrose 50% Injectable 12.5 Gram(s) IV Push once  dextrose 50% Injectable 25 Gram(s) IV Push once  enoxaparin Injectable 40 milliGRAM(s) SubCutaneous daily  glucagon  Injectable 1 milliGRAM(s) IntraMuscular once  hydrochlorothiazide 12.5 milliGRAM(s) Oral daily  influenza  Vaccine (HIGH DOSE) 0.7 milliLiter(s) IntraMuscular once  insulin lispro (ADMELOG) corrective regimen sliding scale   SubCutaneous every 6 hours  lactulose Syrup 20 Gram(s) Oral two times a day  pantoprazole  Injectable 40 milliGRAM(s) IV Push daily  piperacillin/tazobactam IVPB.. 2.25 Gram(s) IV Intermittent every 6 hours  polyethylene glycol 3350 17 Gram(s) Oral every 12 hours  predniSONE   Tablet 40 milliGRAM(s) Oral daily  vitamin A &amp; D Ointment 1 Application(s) Topical daily    MEDICATIONS  (PRN):  fentaNYL    Injectable 12.5 MICROGram(s) IV Push every 8 hours PRN Moderate Pain (4 - 6)  lactulose Retention Enema 200 Gram(s) Rectal two times a day PRN constipation  morphine  - Injectable 2 milliGRAM(s) IV Push three times a day PRN Severe Pain (7 - 10)

## 2022-02-26 NOTE — PROGRESS NOTE ADULT - SUBJECTIVE AND OBJECTIVE BOX
AM rounds   Pt remains on vent in ICU     EXAM :   Sacrum -  wound with central gray yellow unhealthy tissue   Skin - pink wounds torso and extremities - evidence of healing -  greenish discoloration on dressings and linen.      A/P   Stage 4 pressure ulcer of sacrum   Bullous pemphigoid - open wounds - drainage suggestive of Pseudomonas colonization  Continue wound care - Santyl and hydrogel to sacrum; offloading   Recommend cx of skin wounds and antimicrobial coverage if green drainage persists        AM rounds   Pt remains on vent in ICU     EXAM :   Sacrum -  wound with central gray yellow unhealthy tissue   Skin - pink wounds torso and extremities - evidence of healing -  greenish discoloration on dressings and linen.      A/P   Stage 4 pressure ulcer of sacrum   Bullous pemphigoid - open wounds - drainage suggestive of Pseudomonas colonization  Continue wound care - Santyl and hydrogel to sacrum; offloading   Recommend wound cx and  increase to daily dressing changes with SSD   Pt currently on Zosyn

## 2022-02-26 NOTE — PROGRESS NOTE ADULT - SUBJECTIVE AND OBJECTIVE BOX
Patient is a 88y old  Female who presents with a chief complaint of hypoxia (19 Feb 2022 09:16)        Over Night Events:  Remains on MV.  Off pressors.          ROS:     All ROS are negative except HPI         PHYSICAL EXAM    ICU Vital Signs Last 24 Hrs  T(C): 36.4 (26 Feb 2022 05:00), Max: 36.4 (25 Feb 2022 12:00)  T(F): 97.5 (26 Feb 2022 05:00), Max: 97.6 (25 Feb 2022 20:00)  HR: 66 (26 Feb 2022 06:00) (63 - 81)  BP: 166/79 (26 Feb 2022 06:00) (150/61 - 193/90)  BP(mean): 114 (26 Feb 2022 06:00) (98 - 141)  ABP: --  ABP(mean): --  RR: --  SpO2: 100% (26 Feb 2022 06:00) (95% - 100%)      CONSTITUTIONAL:  Ill appearing in NAD    ENT:   Airway patent,   Mouth with normal mucosa.   No thrush    EYES:   Pupils equal,   Round and reactive to light.    CARDIAC:   Normal rate,   Regular rhythm.    edema      Vascular:  Normal systolic impulse  No Carotid bruits    RESPIRATORY:   No wheezing  Bilateral BS  Normal chest expansion  Not tachypneic,  No use of accessory muscles    GASTROINTESTINAL:  Abdomen soft,   Non-tender,   No guarding,   + BS    MUSCULOSKELETAL:   Range of motion is limited,  No clubbing, cyanosis    NEUROLOGICAL:   Opens eyes.  Does not follow commands .    SKIN:   Skin normal color for race,     PSYCHIATRIC:   Normal mood and affect.   No apparent risk to self or others.    HEMATOLOGICAL:  No cervical  lymphadenopathy.  no inguinal lymphadenopathy      02-25-22 @ 07:01  -  02-26-22 @ 07:00  --------------------------------------------------------  IN:    Free Water: 600 mL    Glucerna 1.5: 840 mL  Total IN: 1440 mL    OUT:    Indwelling Catheter - Urethral (mL): 995 mL  Total OUT: 995 mL    Total NET: 445 mL          LABS:                            7.6    9.98  )-----------( 261      ( 26 Feb 2022 04:30 )             23.8                                               02-26    132<L>  |  98  |  18  ----------------------------<  143<H>  3.8   |  25  |  <0.5<L>    Ca    7.2<L>      26 Feb 2022 04:30  Mg     1.9     02-26    TPro  4.8<L>  /  Alb  2.1<L>  /  TBili  1.0  /  DBili  x   /  AST  39  /  ALT  80<H>  /  AlkPhos  296<H>  02-26                                                                                           LIVER FUNCTIONS - ( 26 Feb 2022 04:30 )  Alb: 2.1 g/dL / Pro: 4.8 g/dL / ALK PHOS: 296 U/L / ALT: 80 U/L / AST: 39 U/L / GGT: x                                                  Culture - Sputum (collected 24 Feb 2022 09:40)  Source: .Sputum Sputum  Gram Stain (24 Feb 2022 23:44):    Few polymorphonuclear leukocytes per low power field    No Squamous epithelial cells per low power field    Few Gram Negative Rods per oil power field  Preliminary Report (25 Feb 2022 16:16):    Numerous Pseudomonas aeruginosa    Normal Respiratory Candida absent                                                   Mode: AC/ CMV (Assist Control/ Continuous Mandatory Ventilation)  RR (machine): 12  TV (machine): 300  FiO2: 30  PEEP: 5  ITime: 1  MAP: 8  PIP: 14                                      ABG - ( 24 Feb 2022 12:17 )  pH, Arterial: 7.51  pH, Blood: x     /  pCO2: 35    /  pO2: 121   / HCO3: 28    / Base Excess: 4.9   /  SaO2: 100.0               MEDICATIONS  (STANDING):  chlorhexidine 0.12% Liquid 15 milliLiter(s) Oral Mucosa every 12 hours  chlorhexidine 4% Liquid 1 Application(s) Topical <User Schedule>  collagenase Ointment 1 Application(s) Topical two times a day  collagenase Ointment 1 Application(s) Topical two times a day  dextrose 40% Gel 15 Gram(s) Oral once  dextrose 5%. 1000 milliLiter(s) (50 mL/Hr) IV Continuous <Continuous>  dextrose 5%. 1000 milliLiter(s) (100 mL/Hr) IV Continuous <Continuous>  dextrose 50% Injectable 25 Gram(s) IV Push once  dextrose 50% Injectable 12.5 Gram(s) IV Push once  dextrose 50% Injectable 25 Gram(s) IV Push once  enoxaparin Injectable 40 milliGRAM(s) SubCutaneous daily  glucagon  Injectable 1 milliGRAM(s) IntraMuscular once  influenza  Vaccine (HIGH DOSE) 0.7 milliLiter(s) IntraMuscular once  insulin lispro (ADMELOG) corrective regimen sliding scale   SubCutaneous every 6 hours  lactulose Syrup 20 Gram(s) Oral two times a day  norepinephrine Infusion 0.05 MICROgram(s)/kG/Min (1.8 mL/Hr) IV Continuous <Continuous>  pantoprazole  Injectable 40 milliGRAM(s) IV Push daily  piperacillin/tazobactam IVPB.. 2.25 Gram(s) IV Intermittent every 6 hours  polyethylene glycol 3350 17 Gram(s) Oral every 12 hours  predniSONE   Tablet 40 milliGRAM(s) Oral daily  vitamin A &amp; D Ointment 1 Application(s) Topical daily    MEDICATIONS  (PRN):  fentaNYL    Injectable 12.5 MICROGram(s) IV Push every 8 hours PRN Moderate Pain (4 - 6)  lactulose Retention Enema 200 Gram(s) Rectal two times a day PRN constipation  morphine  - Injectable 2 milliGRAM(s) IV Push three times a day PRN Severe Pain (7 - 10)      New X-rays reviewed:                                                                                  ECHO    CXR interpreted by me:  bibasilar infiltrates

## 2022-02-27 LAB
ALBUMIN SERPL ELPH-MCNC: 1.7 G/DL — LOW (ref 3.5–5.2)
ALP SERPL-CCNC: 224 U/L — HIGH (ref 30–115)
ALT FLD-CCNC: 68 U/L — HIGH (ref 0–41)
ANION GAP SERPL CALC-SCNC: 10 MMOL/L — SIGNIFICANT CHANGE UP (ref 7–14)
AST SERPL-CCNC: 31 U/L — SIGNIFICANT CHANGE UP (ref 0–41)
BILIRUB SERPL-MCNC: 0.9 MG/DL — SIGNIFICANT CHANGE UP (ref 0.2–1.2)
BUN SERPL-MCNC: 16 MG/DL — SIGNIFICANT CHANGE UP (ref 10–20)
CALCIUM SERPL-MCNC: 7.1 MG/DL — LOW (ref 8.5–10.1)
CHLORIDE SERPL-SCNC: 95 MMOL/L — LOW (ref 98–110)
CO2 SERPL-SCNC: 27 MMOL/L — SIGNIFICANT CHANGE UP (ref 17–32)
CREAT SERPL-MCNC: <0.5 MG/DL — LOW (ref 0.7–1.5)
GLUCOSE BLDC GLUCOMTR-MCNC: 128 MG/DL — HIGH (ref 70–99)
GLUCOSE BLDC GLUCOMTR-MCNC: 149 MG/DL — HIGH (ref 70–99)
GLUCOSE BLDC GLUCOMTR-MCNC: 169 MG/DL — HIGH (ref 70–99)
GLUCOSE BLDC GLUCOMTR-MCNC: 213 MG/DL — HIGH (ref 70–99)
GLUCOSE SERPL-MCNC: 126 MG/DL — HIGH (ref 70–99)
HCT VFR BLD CALC: 22.5 % — LOW (ref 37–47)
HGB BLD-MCNC: 7.1 G/DL — LOW (ref 12–16)
MCHC RBC-ENTMCNC: 29.3 PG — SIGNIFICANT CHANGE UP (ref 27–31)
MCHC RBC-ENTMCNC: 31.6 G/DL — LOW (ref 32–37)
MCV RBC AUTO: 93 FL — SIGNIFICANT CHANGE UP (ref 81–99)
NRBC # BLD: 0 /100 WBCS — SIGNIFICANT CHANGE UP (ref 0–0)
PLATELET # BLD AUTO: 210 K/UL — SIGNIFICANT CHANGE UP (ref 130–400)
POTASSIUM SERPL-MCNC: 2.9 MMOL/L — LOW (ref 3.5–5)
POTASSIUM SERPL-SCNC: 2.9 MMOL/L — LOW (ref 3.5–5)
PROT SERPL-MCNC: 4.5 G/DL — LOW (ref 6–8)
RBC # BLD: 2.42 M/UL — LOW (ref 4.2–5.4)
RBC # FLD: 17.9 % — HIGH (ref 11.5–14.5)
SARS-COV-2 RNA SPEC QL NAA+PROBE: DETECTED
SODIUM SERPL-SCNC: 132 MMOL/L — LOW (ref 135–146)
WBC # BLD: 10.99 K/UL — HIGH (ref 4.8–10.8)
WBC # FLD AUTO: 10.99 K/UL — HIGH (ref 4.8–10.8)

## 2022-02-27 PROCEDURE — 71045 X-RAY EXAM CHEST 1 VIEW: CPT | Mod: 26

## 2022-02-27 PROCEDURE — 74018 RADEX ABDOMEN 1 VIEW: CPT | Mod: 26

## 2022-02-27 PROCEDURE — 99232 SBSQ HOSP IP/OBS MODERATE 35: CPT

## 2022-02-27 PROCEDURE — 99233 SBSQ HOSP IP/OBS HIGH 50: CPT

## 2022-02-27 RX ORDER — POTASSIUM CHLORIDE 20 MEQ
20 PACKET (EA) ORAL
Refills: 0 | Status: DISCONTINUED | OUTPATIENT
Start: 2022-02-27 | End: 2022-02-27

## 2022-02-27 RX ORDER — POTASSIUM CHLORIDE 20 MEQ
40 PACKET (EA) ORAL EVERY 4 HOURS
Refills: 0 | Status: COMPLETED | OUTPATIENT
Start: 2022-02-27 | End: 2022-02-27

## 2022-02-27 RX ORDER — FENTANYL CITRATE 50 UG/ML
25 INJECTION INTRAVENOUS
Refills: 0 | Status: DISCONTINUED | OUTPATIENT
Start: 2022-02-27 | End: 2022-02-27

## 2022-02-27 RX ADMIN — Medication 1 APPLICATION(S): at 11:52

## 2022-02-27 RX ADMIN — CHLORHEXIDINE GLUCONATE 15 MILLILITER(S): 213 SOLUTION TOPICAL at 18:33

## 2022-02-27 RX ADMIN — Medication 1: at 17:46

## 2022-02-27 RX ADMIN — Medication 12.5 MILLIGRAM(S): at 12:34

## 2022-02-27 RX ADMIN — FENTANYL CITRATE 12.5 MICROGRAM(S): 50 INJECTION INTRAVENOUS at 22:35

## 2022-02-27 RX ADMIN — FENTANYL CITRATE 12.5 MICROGRAM(S): 50 INJECTION INTRAVENOUS at 21:25

## 2022-02-27 RX ADMIN — Medication 40 MILLIEQUIVALENT(S): at 15:20

## 2022-02-27 RX ADMIN — CHLORHEXIDINE GLUCONATE 15 MILLILITER(S): 213 SOLUTION TOPICAL at 05:06

## 2022-02-27 RX ADMIN — PANTOPRAZOLE SODIUM 40 MILLIGRAM(S): 20 TABLET, DELAYED RELEASE ORAL at 11:51

## 2022-02-27 RX ADMIN — Medication 1 APPLICATION(S): at 18:49

## 2022-02-27 RX ADMIN — Medication 1 APPLICATION(S): at 05:06

## 2022-02-27 RX ADMIN — Medication 40 MILLIEQUIVALENT(S): at 09:13

## 2022-02-27 RX ADMIN — PIPERACILLIN AND TAZOBACTAM 200 GRAM(S): 4; .5 INJECTION, POWDER, LYOPHILIZED, FOR SOLUTION INTRAVENOUS at 05:07

## 2022-02-27 RX ADMIN — LACTULOSE 20 GRAM(S): 10 SOLUTION ORAL at 18:34

## 2022-02-27 RX ADMIN — ENOXAPARIN SODIUM 40 MILLIGRAM(S): 100 INJECTION SUBCUTANEOUS at 11:51

## 2022-02-27 RX ADMIN — PIPERACILLIN AND TAZOBACTAM 200 GRAM(S): 4; .5 INJECTION, POWDER, LYOPHILIZED, FOR SOLUTION INTRAVENOUS at 18:35

## 2022-02-27 RX ADMIN — POLYETHYLENE GLYCOL 3350 17 GRAM(S): 17 POWDER, FOR SOLUTION ORAL at 18:50

## 2022-02-27 RX ADMIN — CHLORHEXIDINE GLUCONATE 1 APPLICATION(S): 213 SOLUTION TOPICAL at 05:06

## 2022-02-27 RX ADMIN — Medication 40 MILLIEQUIVALENT(S): at 11:50

## 2022-02-27 RX ADMIN — PIPERACILLIN AND TAZOBACTAM 200 GRAM(S): 4; .5 INJECTION, POWDER, LYOPHILIZED, FOR SOLUTION INTRAVENOUS at 11:53

## 2022-02-27 RX ADMIN — Medication 40 MILLIGRAM(S): at 12:34
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
## 2022-02-27 NOTE — PROGRESS NOTE ADULT - TIME BILLING
#Progress Note Handoff  Pending (specify):  PS today 2 hrs, follow up pulm note, 1 unit prbc, follow up K   Family discussion: house staff updated pt family  Disposition: DC planning, phi ulloa. spoke to CM  Pt is being co-managed by Medicine and Critical care/Pulmonary team. Decisions on ventilator settings/changes, drips and ICU level of care aspects of the case has been discussed and approved  with/by  ICU/Pulmonary attending.
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
## 2022-02-27 NOTE — PROGRESS NOTE ADULT - SUBJECTIVE AND OBJECTIVE BOX
Patient is a 88y old  Female who presents with a chief complaint of covid (26 Feb 2022 11:32)        Over Night Events:  Remains on MV.  Tolerated 2 hours of PS yesterday         ROS:     All ROS are negative except HPI         PHYSICAL EXAM    ICU Vital Signs Last 24 Hrs  T(C): 36.2 (27 Feb 2022 04:00), Max: 36.4 (26 Feb 2022 20:00)  T(F): 97.2 (27 Feb 2022 04:00), Max: 97.5 (26 Feb 2022 20:00)  HR: 68 (27 Feb 2022 07:00) (60 - 70)  BP: 133/61 (27 Feb 2022 07:00) (105/51 - 169/79)  BP(mean): 88 (27 Feb 2022 07:00) (74 - 113)  ABP: --  ABP(mean): --  RR: --  SpO2: 100% (27 Feb 2022 07:00) (99% - 100%)      CONSTITUTIONAL:  Ill appearing in NAD    ENT:   Airway patent,   Mouth with normal mucosa.   No thrush    EYES:   Pupils equal,   Round and reactive to light.    CARDIAC:   Normal rate,   Regular rhythm.    No edema      Vascular:  Normal systolic impulse  No Carotid bruits    RESPIRATORY:   No wheezing  Bilateral BS  Normal chest expansion  Not tachypneic,  No use of accessory muscles    GASTROINTESTINAL:  Abdomen soft,   Non-tender,   No guarding,   + BS    MUSCULOSKELETAL:   Range of motion is not limited,  No clubbing, cyanosis    NEUROLOGICAL:   Opens eyes.    SKIN:   Skin normal color for race,     PSYCHIATRIC:   No apparent risk to self or others.    HEMATOLOGICAL:  No cervical  lymphadenopathy.  no inguinal lymphadenopathy      02-26-22 @ 07:01  -  02-27-22 @ 07:00  --------------------------------------------------------  IN:    Free Water: 600 mL    Glucerna 1.5: 840 mL  Total IN: 1440 mL    OUT:    Indwelling Catheter - Urethral (mL): 1295 mL    Rectal Tube (mL): 200 mL  Total OUT: 1495 mL    Total NET: -55 mL          LABS:                            7.1    10.99 )-----------( 210      ( 27 Feb 2022 05:00 )             22.5                                               02-27             7.1    10.99 )-----------( 210      ( 02-27 @ 05:00 )             22.5                7.6    9.98  )-----------( 261      ( 02-26 @ 04:30 )             23.8                7.5    14.34 )-----------( 283      ( 02-25 @ 05:50 )             24.2           132<L>  |  95<L>  |  16  ----------------------------<  126<H>  2.9<L>   |  27  |  <0.5<L>    Ca    7.1<L>      27 Feb 2022 05:00  Mg     1.9     02-26    TPro  4.5<L>  /  Alb  1.7<L>  /  TBili  0.9  /  DBili  x   /  AST  31  /  ALT  68<H>  /  AlkPhos  224<H>  02-27                                                                                           LIVER FUNCTIONS - ( 27 Feb 2022 05:00 )  Alb: 1.7 g/dL / Pro: 4.5 g/dL / ALK PHOS: 224 U/L / ALT: 68 U/L / AST: 31 U/L / GGT: x                                                  Culture - Sputum (collected 24 Feb 2022 09:40)  Source: .Sputum Sputum  Gram Stain (24 Feb 2022 23:44):    Few polymorphonuclear leukocytes per low power field    No Squamous epithelial cells per low power field    Few Gram Negative Rods per oil power field  Final Report (26 Feb 2022 20:46):    Numerous Pseudomonas aeruginosa    Normal Respiratory Candida absent  Organism: Pseudomonas aeruginosa (26 Feb 2022 20:46)  Organism: Pseudomonas aeruginosa (26 Feb 2022 20:46)                                                   Mode: AC/ CMV (Assist Control/ Continuous Mandatory Ventilation)  RR (machine): 12  TV (machine): 300  FiO2: 30  PEEP: 5  ITime: 1  MAP: 8  PIP: 17                                          MEDICATIONS  (STANDING):  chlorhexidine 0.12% Liquid 15 milliLiter(s) Oral Mucosa every 12 hours  chlorhexidine 4% Liquid 1 Application(s) Topical <User Schedule>  collagenase Ointment 1 Application(s) Topical two times a day  collagenase Ointment 1 Application(s) Topical two times a day  dextrose 40% Gel 15 Gram(s) Oral once  dextrose 5%. 1000 milliLiter(s) (50 mL/Hr) IV Continuous <Continuous>  dextrose 5%. 1000 milliLiter(s) (100 mL/Hr) IV Continuous <Continuous>  dextrose 50% Injectable 25 Gram(s) IV Push once  dextrose 50% Injectable 12.5 Gram(s) IV Push once  dextrose 50% Injectable 25 Gram(s) IV Push once  enoxaparin Injectable 40 milliGRAM(s) SubCutaneous daily  glucagon  Injectable 1 milliGRAM(s) IntraMuscular once  hydrochlorothiazide 12.5 milliGRAM(s) Oral daily  influenza  Vaccine (HIGH DOSE) 0.7 milliLiter(s) IntraMuscular once  insulin lispro (ADMELOG) corrective regimen sliding scale   SubCutaneous every 6 hours  lactulose Syrup 20 Gram(s) Oral two times a day  pantoprazole  Injectable 40 milliGRAM(s) IV Push daily  piperacillin/tazobactam IVPB.. 2.25 Gram(s) IV Intermittent every 6 hours  polyethylene glycol 3350 17 Gram(s) Oral every 12 hours  potassium chloride   Powder 40 milliEquivalent(s) Enteral Tube every 4 hours  predniSONE   Tablet 40 milliGRAM(s) Oral daily  vitamin A &amp; D Ointment 1 Application(s) Topical daily    MEDICATIONS  (PRN):  fentaNYL    Injectable 12.5 MICROGram(s) IV Push every 8 hours PRN Moderate Pain (4 - 6)  lactulose Retention Enema 200 Gram(s) Rectal two times a day PRN constipation      New X-rays reviewed:                                                                                  ECHO    CXR interpreted by me:  Trach in place.  Right basilar infiltrates

## 2022-02-27 NOTE — PROGRESS NOTE ADULT - SUBJECTIVE AND OBJECTIVE BOX
----------Daily Progress Note----------    HISTORY OF PRESENT ILLNESS:  Patient is a 88y old Female who presents with a chief complaint of covid (26 Feb 2022 11:32)    Currently admitted to medicine with the primary diagnosis of Acute respiratory failure with hypoxia       Today is hospital day 18d.     INTERVAL HOSPITAL COURSE / OVERNIGHT EVENTS:    Pt trach'd/PEG. No overnight events    Review of Systems: Otherwise unremarkable     <<<<<PAST MEDICAL & SURGICAL HISTORY>>>>>    ALLERGIES  No Known Allergies      Home Medications:        MEDICATIONS  STANDING MEDICATIONS  chlorhexidine 0.12% Liquid 15 milliLiter(s) Oral Mucosa every 12 hours  chlorhexidine 4% Liquid 1 Application(s) Topical <User Schedule>  collagenase Ointment 1 Application(s) Topical two times a day  collagenase Ointment 1 Application(s) Topical two times a day  dextrose 40% Gel 15 Gram(s) Oral once  dextrose 5%. 1000 milliLiter(s) IV Continuous <Continuous>  dextrose 5%. 1000 milliLiter(s) IV Continuous <Continuous>  dextrose 50% Injectable 25 Gram(s) IV Push once  dextrose 50% Injectable 12.5 Gram(s) IV Push once  dextrose 50% Injectable 25 Gram(s) IV Push once  enoxaparin Injectable 40 milliGRAM(s) SubCutaneous daily  glucagon  Injectable 1 milliGRAM(s) IntraMuscular once  hydrochlorothiazide 12.5 milliGRAM(s) Oral daily  influenza  Vaccine (HIGH DOSE) 0.7 milliLiter(s) IntraMuscular once  insulin lispro (ADMELOG) corrective regimen sliding scale   SubCutaneous every 6 hours  lactulose Syrup 20 Gram(s) Oral two times a day  pantoprazole  Injectable 40 milliGRAM(s) IV Push daily  piperacillin/tazobactam IVPB.. 2.25 Gram(s) IV Intermittent every 6 hours  polyethylene glycol 3350 17 Gram(s) Oral every 12 hours  predniSONE   Tablet 40 milliGRAM(s) Oral daily  vitamin A &amp; D Ointment 1 Application(s) Topical daily    PRN MEDICATIONS  fentaNYL    Injectable 12.5 MICROGram(s) IV Push every 8 hours PRN  lactulose Retention Enema 200 Gram(s) Rectal two times a day PRN    VITALS:  T(F): 97.5  HR: 66  BP: 119/56  RR: --  SpO2: 100%    <<<<<LABS>>>>>                        7.6    9.98  )-----------( 261      ( 26 Feb 2022 04:30 )             23.8     02-26    132<L>  |  98  |  18  ----------------------------<  143<H>  3.8   |  25  |  <0.5<L>    Ca    7.2<L>      26 Feb 2022 04:30  Mg     1.9     02-26    TPro  4.8<L>  /  Alb  2.1<L>  /  TBili  1.0  /  DBili  x   /  AST  39  /  ALT  80<H>  /  AlkPhos  296<H>  02-26              Culture - Sputum (collected 24 Feb 2022 09:40)  Source: .Sputum Sputum  Gram Stain (24 Feb 2022 23:44):    Few polymorphonuclear leukocytes per low power field    No Squamous epithelial cells per low power field    Few Gram Negative Rods per oil power field  Final Report (26 Feb 2022 20:46):    Numerous Pseudomonas aeruginosa    Normal Respiratory Candida absent  Organism: Pseudomonas aeruginosa (26 Feb 2022 20:46)  Organism: Pseudomonas aeruginosa (26 Feb 2022 20:46)    [id number]        <<<<<RADIOLOGY>>>>>    < from: Xray Chest 1 View- PORTABLE-Routine (Xray Chest 1 View- PORTABLE-Routine in AM.) (02.25.22 @ 05:42) >    PROCEDURE DATE:  02/25/2022          INTERPRETATION:  CLINICAL HISTORY: trach.    COMPARISON: 2/24/2022.    TECHNIQUE: Portable frontal chest radiograph. Adequate positioning.    FINDINGS:    Support devices: Stable tracheostomy tube and right IJ line.    Cardiac/mediastinum/hilum: Stable enlarged cardiac silhouette.    Lung parenchyma/Pleura: No change in bilateral lower lung zone airspace   opacities. No pneumothorax.    Skeleton/soft tissues: Stable.      IMPRESSION:    No interval change since one day earlier.    < end of copied text >      <<<<<PHYSICAL EXAM>>>>>  GENERAL: ill-appearing; trach/PEG tube intact  PULMONARY: Clear to auscultation bilaterally. Intubated  CARDIOVASCULAR: Regular rate and rhythm, S1-S2, no murmurs  GASTROINTESTINAL: PEG tube intact  SKIN/EXTREMITIES: bullae on hands and legs b/l  NEUROLOGIC: trached      ----------------------------------------------------------------------------------------------------------------------------------------------------------------------------------------------- ----------Daily Progress Note----------    HISTORY OF PRESENT ILLNESS:  Patient is a 88y old Female who presents with a chief complaint of covid (26 Feb 2022 11:32)    Currently admitted to medicine with the primary diagnosis of Acute respiratory failure with hypoxia    Attending note: pt seen and examined at bedside. Pt vented.     Mode: CPAP with PS  FiO2: 30  PEEP: 5  PS: 5  MAP: 8  PIP: 12           Today is hospital day 18d.     INTERVAL HOSPITAL COURSE / OVERNIGHT EVENTS:    Pt trach'd/PEG. No overnight events    Review of Systems: Otherwise unremarkable     <<<<<PAST MEDICAL & SURGICAL HISTORY>>>>>    ALLERGIES  No Known Allergies      Home Medications:        MEDICATIONS  STANDING MEDICATIONS  chlorhexidine 0.12% Liquid 15 milliLiter(s) Oral Mucosa every 12 hours  chlorhexidine 4% Liquid 1 Application(s) Topical <User Schedule>  collagenase Ointment 1 Application(s) Topical two times a day  collagenase Ointment 1 Application(s) Topical two times a day  dextrose 40% Gel 15 Gram(s) Oral once  dextrose 5%. 1000 milliLiter(s) IV Continuous <Continuous>  dextrose 5%. 1000 milliLiter(s) IV Continuous <Continuous>  dextrose 50% Injectable 25 Gram(s) IV Push once  dextrose 50% Injectable 12.5 Gram(s) IV Push once  dextrose 50% Injectable 25 Gram(s) IV Push once  enoxaparin Injectable 40 milliGRAM(s) SubCutaneous daily  glucagon  Injectable 1 milliGRAM(s) IntraMuscular once  hydrochlorothiazide 12.5 milliGRAM(s) Oral daily  influenza  Vaccine (HIGH DOSE) 0.7 milliLiter(s) IntraMuscular once  insulin lispro (ADMELOG) corrective regimen sliding scale   SubCutaneous every 6 hours  lactulose Syrup 20 Gram(s) Oral two times a day  pantoprazole  Injectable 40 milliGRAM(s) IV Push daily  piperacillin/tazobactam IVPB.. 2.25 Gram(s) IV Intermittent every 6 hours  polyethylene glycol 3350 17 Gram(s) Oral every 12 hours  predniSONE   Tablet 40 milliGRAM(s) Oral daily  vitamin A &amp; D Ointment 1 Application(s) Topical daily    PRN MEDICATIONS  fentaNYL    Injectable 12.5 MICROGram(s) IV Push every 8 hours PRN  lactulose Retention Enema 200 Gram(s) Rectal two times a day PRN    VITALS:  T(F): 97.5  HR: 66  BP: 119/56  RR: --  SpO2: 100%    <<<<<LABS>>>>>                        7.6    9.98  )-----------( 261      ( 26 Feb 2022 04:30 )             23.8     02-26    132<L>  |  98  |  18  ----------------------------<  143<H>  3.8   |  25  |  <0.5<L>    Ca    7.2<L>      26 Feb 2022 04:30  Mg     1.9     02-26    TPro  4.8<L>  /  Alb  2.1<L>  /  TBili  1.0  /  DBili  x   /  AST  39  /  ALT  80<H>  /  AlkPhos  296<H>  02-26              Culture - Sputum (collected 24 Feb 2022 09:40)  Source: .Sputum Sputum  Gram Stain (24 Feb 2022 23:44):    Few polymorphonuclear leukocytes per low power field    No Squamous epithelial cells per low power field    Few Gram Negative Rods per oil power field  Final Report (26 Feb 2022 20:46):    Numerous Pseudomonas aeruginosa    Normal Respiratory Candida absent  Organism: Pseudomonas aeruginosa (26 Feb 2022 20:46)  Organism: Pseudomonas aeruginosa (26 Feb 2022 20:46)    [id number]        <<<<<RADIOLOGY>>>>>    < from: Xray Chest 1 View- PORTABLE-Routine (Xray Chest 1 View- PORTABLE-Routine in AM.) (02.25.22 @ 05:42) >    PROCEDURE DATE:  02/25/2022          INTERPRETATION:  CLINICAL HISTORY: trach.    COMPARISON: 2/24/2022.    TECHNIQUE: Portable frontal chest radiograph. Adequate positioning.    FINDINGS:    Support devices: Stable tracheostomy tube and right IJ line.    Cardiac/mediastinum/hilum: Stable enlarged cardiac silhouette.    Lung parenchyma/Pleura: No change in bilateral lower lung zone airspace   opacities. No pneumothorax.    Skeleton/soft tissues: Stable.      IMPRESSION:    No interval change since one day earlier.    < end of copied text >      <<<<<PHYSICAL EXAM>>>>>  GENERAL: ill-appearing; trach/PEG tube intact  PULMONARY: Clear to auscultation bilaterally. Intubated  CARDIOVASCULAR: Regular rate and rhythm, S1-S2, no murmurs  GASTROINTESTINAL: PEG tube intact  SKIN/EXTREMITIES: bullae on hands and legs b/l  NEUROLOGIC: trached      -----------------------------------------------------------------------------------------------------------------------------------------------------------------------------------------------

## 2022-02-28 LAB
ALBUMIN SERPL ELPH-MCNC: 2 G/DL — LOW (ref 3.5–5.2)
ALP SERPL-CCNC: 228 U/L — HIGH (ref 30–115)
ALT FLD-CCNC: 77 U/L — HIGH (ref 0–41)
ANION GAP SERPL CALC-SCNC: 10 MMOL/L — SIGNIFICANT CHANGE UP (ref 7–14)
AST SERPL-CCNC: 45 U/L — HIGH (ref 0–41)
BILIRUB SERPL-MCNC: 1.4 MG/DL — HIGH (ref 0.2–1.2)
BUN SERPL-MCNC: 14 MG/DL — SIGNIFICANT CHANGE UP (ref 10–20)
CALCIUM SERPL-MCNC: 7.3 MG/DL — LOW (ref 8.5–10.1)
CHLORIDE SERPL-SCNC: 94 MMOL/L — LOW (ref 98–110)
CO2 SERPL-SCNC: 26 MMOL/L — SIGNIFICANT CHANGE UP (ref 17–32)
CREAT SERPL-MCNC: <0.5 MG/DL — LOW (ref 0.7–1.5)
CULTURE RESULTS: SIGNIFICANT CHANGE UP
GLUCOSE BLDC GLUCOMTR-MCNC: 117 MG/DL — HIGH (ref 70–99)
GLUCOSE BLDC GLUCOMTR-MCNC: 135 MG/DL — HIGH (ref 70–99)
GLUCOSE BLDC GLUCOMTR-MCNC: 188 MG/DL — HIGH (ref 70–99)
GLUCOSE SERPL-MCNC: 138 MG/DL — HIGH (ref 70–99)
HCT VFR BLD CALC: 27.8 % — LOW (ref 37–47)
HGB BLD-MCNC: 9.3 G/DL — LOW (ref 12–16)
MAGNESIUM SERPL-MCNC: 1.7 MG/DL — LOW (ref 1.8–2.4)
MCHC RBC-ENTMCNC: 29.5 PG — SIGNIFICANT CHANGE UP (ref 27–31)
MCHC RBC-ENTMCNC: 33.5 G/DL — SIGNIFICANT CHANGE UP (ref 32–37)
MCV RBC AUTO: 88.3 FL — SIGNIFICANT CHANGE UP (ref 81–99)
NRBC # BLD: 0 /100 WBCS — SIGNIFICANT CHANGE UP (ref 0–0)
PLATELET # BLD AUTO: 195 K/UL — SIGNIFICANT CHANGE UP (ref 130–400)
POTASSIUM SERPL-MCNC: 3.4 MMOL/L — LOW (ref 3.5–5)
POTASSIUM SERPL-SCNC: 3.4 MMOL/L — LOW (ref 3.5–5)
PROT SERPL-MCNC: 4.7 G/DL — LOW (ref 6–8)
RBC # BLD: 3.15 M/UL — LOW (ref 4.2–5.4)
RBC # FLD: 17.6 % — HIGH (ref 11.5–14.5)
SODIUM SERPL-SCNC: 130 MMOL/L — LOW (ref 135–146)
SPECIMEN SOURCE: SIGNIFICANT CHANGE UP
WBC # BLD: 15.9 K/UL — HIGH (ref 4.8–10.8)
WBC # FLD AUTO: 15.9 K/UL — HIGH (ref 4.8–10.8)

## 2022-02-28 PROCEDURE — 99233 SBSQ HOSP IP/OBS HIGH 50: CPT

## 2022-02-28 RX ORDER — MEROPENEM 1 G/30ML
INJECTION INTRAVENOUS
Refills: 0 | Status: DISCONTINUED | OUTPATIENT
Start: 2022-02-28 | End: 2022-03-01

## 2022-02-28 RX ORDER — MEROPENEM 1 G/30ML
1000 INJECTION INTRAVENOUS ONCE
Refills: 0 | Status: COMPLETED | OUTPATIENT
Start: 2022-02-28 | End: 2022-02-28

## 2022-02-28 RX ORDER — POTASSIUM CHLORIDE 20 MEQ
20 PACKET (EA) ORAL EVERY 4 HOURS
Refills: 0 | Status: COMPLETED | OUTPATIENT
Start: 2022-02-28 | End: 2022-02-28

## 2022-02-28 RX ORDER — MAGNESIUM SULFATE 500 MG/ML
2 VIAL (ML) INJECTION EVERY 4 HOURS
Refills: 0 | Status: COMPLETED | OUTPATIENT
Start: 2022-02-28 | End: 2022-02-28

## 2022-02-28 RX ORDER — MEROPENEM 1 G/30ML
1000 INJECTION INTRAVENOUS EVERY 8 HOURS
Refills: 0 | Status: DISCONTINUED | OUTPATIENT
Start: 2022-02-28 | End: 2022-03-01

## 2022-02-28 RX ADMIN — POLYETHYLENE GLYCOL 3350 17 GRAM(S): 17 POWDER, FOR SOLUTION ORAL at 17:02

## 2022-02-28 RX ADMIN — FENTANYL CITRATE 12.5 MICROGRAM(S): 50 INJECTION INTRAVENOUS at 05:06

## 2022-02-28 RX ADMIN — ENOXAPARIN SODIUM 40 MILLIGRAM(S): 100 INJECTION SUBCUTANEOUS at 12:10

## 2022-02-28 RX ADMIN — MEROPENEM 100 MILLIGRAM(S): 1 INJECTION INTRAVENOUS at 21:14

## 2022-02-28 RX ADMIN — CHLORHEXIDINE GLUCONATE 15 MILLILITER(S): 213 SOLUTION TOPICAL at 17:01

## 2022-02-28 RX ADMIN — FENTANYL CITRATE 12.5 MICROGRAM(S): 50 INJECTION INTRAVENOUS at 06:40

## 2022-02-28 RX ADMIN — Medication 1 APPLICATION(S): at 17:02

## 2022-02-28 RX ADMIN — Medication 1: at 12:26

## 2022-02-28 RX ADMIN — CHLORHEXIDINE GLUCONATE 15 MILLILITER(S): 213 SOLUTION TOPICAL at 05:05

## 2022-02-28 RX ADMIN — LACTULOSE 20 GRAM(S): 10 SOLUTION ORAL at 05:05

## 2022-02-28 RX ADMIN — Medication 1 APPLICATION(S): at 05:11

## 2022-02-28 RX ADMIN — Medication 1: at 00:38

## 2022-02-28 RX ADMIN — Medication 12.5 MILLIGRAM(S): at 05:05

## 2022-02-28 RX ADMIN — PANTOPRAZOLE SODIUM 40 MILLIGRAM(S): 20 TABLET, DELAYED RELEASE ORAL at 12:10

## 2022-02-28 RX ADMIN — Medication 50 MILLIEQUIVALENT(S): at 14:48

## 2022-02-28 RX ADMIN — PIPERACILLIN AND TAZOBACTAM 200 GRAM(S): 4; .5 INJECTION, POWDER, LYOPHILIZED, FOR SOLUTION INTRAVENOUS at 05:09

## 2022-02-28 RX ADMIN — POLYETHYLENE GLYCOL 3350 17 GRAM(S): 17 POWDER, FOR SOLUTION ORAL at 05:05

## 2022-02-28 RX ADMIN — MEROPENEM 100 MILLIGRAM(S): 1 INJECTION INTRAVENOUS at 13:37

## 2022-02-28 RX ADMIN — Medication 25 GRAM(S): at 13:38

## 2022-02-28 RX ADMIN — PIPERACILLIN AND TAZOBACTAM 200 GRAM(S): 4; .5 INJECTION, POWDER, LYOPHILIZED, FOR SOLUTION INTRAVENOUS at 12:11

## 2022-02-28 RX ADMIN — Medication 1 APPLICATION(S): at 12:09

## 2022-02-28 RX ADMIN — Medication 1 APPLICATION(S): at 17:03

## 2022-02-28 RX ADMIN — LACTULOSE 20 GRAM(S): 10 SOLUTION ORAL at 17:00

## 2022-02-28 RX ADMIN — Medication 50 MILLIEQUIVALENT(S): at 12:28

## 2022-02-28 RX ADMIN — Medication 25 GRAM(S): at 10:43

## 2022-02-28 RX ADMIN — PIPERACILLIN AND TAZOBACTAM 200 GRAM(S): 4; .5 INJECTION, POWDER, LYOPHILIZED, FOR SOLUTION INTRAVENOUS at 00:39

## 2022-02-28 RX ADMIN — Medication 40 MILLIGRAM(S): at 05:04

## 2022-02-28 NOTE — PROGRESS NOTE ADULT - SUBJECTIVE AND OBJECTIVE BOX
ARI MA 88y Female  MRN#: [id number]   CODE STATUS: FULL      SUBJECTIVE  Patient is a 88y old Female who presents with a chief complaint of covid (26 Feb 2022 11:32)    Today is hospital day 19d,   INTERVAL HPI/OVERNIGHT EVENTS:    Examined pt at bedside this AM. There were no acute events o/n. Pt is on ventilator.    OBJECTIVE  PAST MEDICAL & SURGICAL HISTORY    ALLERGIES:  No Known Allergies    HOME MEDICATIONS:  Home Medications:    MEDICATIONS:  STANDING MEDICATIONS  chlorhexidine 0.12% Liquid 15 milliLiter(s) Oral Mucosa every 12 hours  chlorhexidine 4% Liquid 1 Application(s) Topical <User Schedule>  collagenase Ointment 1 Application(s) Topical two times a day  collagenase Ointment 1 Application(s) Topical two times a day  dextrose 40% Gel 15 Gram(s) Oral once  dextrose 5%. 1000 milliLiter(s) (50 mL/Hr) IV Continuous <Continuous>  dextrose 5%. 1000 milliLiter(s) (100 mL/Hr) IV Continuous <Continuous>  dextrose 50% Injectable 25 Gram(s) IV Push once  dextrose 50% Injectable 12.5 Gram(s) IV Push once  dextrose 50% Injectable 25 Gram(s) IV Push once  enoxaparin Injectable 40 milliGRAM(s) SubCutaneous daily  glucagon  Injectable 1 milliGRAM(s) IntraMuscular once  hydrochlorothiazide 12.5 milliGRAM(s) Oral daily  influenza  Vaccine (HIGH DOSE) 0.7 milliLiter(s) IntraMuscular once  insulin lispro (ADMELOG) corrective regimen sliding scale   SubCutaneous every 6 hours  lactulose Syrup 20 Gram(s) Oral two times a day  magnesium sulfate  IVPB 2 Gram(s) IV Intermittent every 4 hours  pantoprazole  Injectable 40 milliGRAM(s) IV Push daily  piperacillin/tazobactam IVPB.. 2.25 Gram(s) IV Intermittent every 6 hours  polyethylene glycol 3350 17 Gram(s) Oral every 12 hours  potassium chloride  20 mEq/100 mL IVPB 20 milliEquivalent(s) IV Intermittent every 4 hours  predniSONE   Tablet 40 milliGRAM(s) Oral daily  vitamin A &amp; D Ointment 1 Application(s) Topical daily    PRN MEDICATIONS  fentaNYL    Injectable 12.5 MICROGram(s) IV Push every 8 hours PRN  fentaNYL    Injectable 25 MICROGram(s) IV Push four times a day PRN  lactulose Retention Enema 200 Gram(s) Rectal two times a day PRN      VITAL SIGNS: Last 24 Hours  T(C): 36.7 (28 Feb 2022 04:00), Max: 37.1 (27 Feb 2022 20:16)  T(F): 98 (28 Feb 2022 04:00), Max: 98.8 (27 Feb 2022 20:16)  HR: 67 (28 Feb 2022 07:00) (59 - 70)  BP: 126/62 (28 Feb 2022 07:00) (115/57 - 168/72)  BP(mean): 89 (28 Feb 2022 07:00) (82 - 103)  RR: 14 (28 Feb 2022 07:00) (12 - 16)  SpO2: 100% (28 Feb 2022 07:00) (99% - 100%)    LABS:                        9.3    15.90 )-----------( 195      ( 28 Feb 2022 07:45 )             27.8     02-28    130<L>  |  94<L>  |  14  ----------------------------<  138<H>  3.4<L>   |  26  |  <0.5<L>    Ca    7.3<L>      28 Feb 2022 07:45  Mg     1.7     02-28    TPro  4.7<L>  /  Alb  2.0<L>  /  TBili  1.4<H>  /  DBili  x   /  AST  45<H>  /  ALT  77<H>  /  AlkPhos  228<H>  02-28        RADIOLOGY:    < from: Xray Kidney Ureter Bladder (02.27.22 @ 12:30) >  INTERPRETATION:  Clinical History / Reason for exam: Abdominal pain  Single image  Comparison 2/19/2022  There is decreased bowel distention. No free air is seen.    IMPRESSION: Decreased bowel distention    --- End of Report ---    < end of copied text >  < from: Xray Chest 1 View- PORTABLE-Routine (Xray Chest 1 View- PORTABLE-Routine in AM.) (02.27.22 @ 06:30) >  Impression:    Left upper lobe opacity. No pleural effusion or air leak    < end of copied text >      PHYSICAL EXAM:  GENERAL: Vented; Trach/PEG tube intact  PULMONARY: Clear to auscultation bilaterally; intubated  CARDIOVASCULAR: Regular rate and rhythm; No murmurs, rubs, or gallops  GASTROINTESTINAL: Soft, Nondistended; PEG tube intact  MUSCULOSKELETAL:  2+ Peripheral Pulses, No clubbing, cyanosis, or edema  NEUROLOGY: AAOx0  SKIN: bullae b/i hands and LEs   ARI MA 88y Female  MRN#: [id number]   CODE STATUS: FULL      SUBJECTIVE  Patient is a 88y old Female who presents with a chief complaint of covid (26 Feb 2022 11:32)    Today is hospital day 19d,   INTERVAL HPI/OVERNIGHT EVENTS:    Examined pt at bedside this AM. There were no acute events o/n. Pt is on ventilator.    Attending note: Pt seen and examined at bedside. Vented, sedated with fent prn    OBJECTIVE  PAST MEDICAL & SURGICAL HISTORY    ALLERGIES:  No Known Allergies    HOME MEDICATIONS:  Home Medications:    MEDICATIONS:  STANDING MEDICATIONS  chlorhexidine 0.12% Liquid 15 milliLiter(s) Oral Mucosa every 12 hours  chlorhexidine 4% Liquid 1 Application(s) Topical <User Schedule>  collagenase Ointment 1 Application(s) Topical two times a day  collagenase Ointment 1 Application(s) Topical two times a day  dextrose 40% Gel 15 Gram(s) Oral once  dextrose 5%. 1000 milliLiter(s) (50 mL/Hr) IV Continuous <Continuous>  dextrose 5%. 1000 milliLiter(s) (100 mL/Hr) IV Continuous <Continuous>  dextrose 50% Injectable 25 Gram(s) IV Push once  dextrose 50% Injectable 12.5 Gram(s) IV Push once  dextrose 50% Injectable 25 Gram(s) IV Push once  enoxaparin Injectable 40 milliGRAM(s) SubCutaneous daily  glucagon  Injectable 1 milliGRAM(s) IntraMuscular once  hydrochlorothiazide 12.5 milliGRAM(s) Oral daily  influenza  Vaccine (HIGH DOSE) 0.7 milliLiter(s) IntraMuscular once  insulin lispro (ADMELOG) corrective regimen sliding scale   SubCutaneous every 6 hours  lactulose Syrup 20 Gram(s) Oral two times a day  magnesium sulfate  IVPB 2 Gram(s) IV Intermittent every 4 hours  pantoprazole  Injectable 40 milliGRAM(s) IV Push daily  piperacillin/tazobactam IVPB.. 2.25 Gram(s) IV Intermittent every 6 hours  polyethylene glycol 3350 17 Gram(s) Oral every 12 hours  potassium chloride  20 mEq/100 mL IVPB 20 milliEquivalent(s) IV Intermittent every 4 hours  predniSONE   Tablet 40 milliGRAM(s) Oral daily  vitamin A &amp; D Ointment 1 Application(s) Topical daily    PRN MEDICATIONS  fentaNYL    Injectable 12.5 MICROGram(s) IV Push every 8 hours PRN  fentaNYL    Injectable 25 MICROGram(s) IV Push four times a day PRN  lactulose Retention Enema 200 Gram(s) Rectal two times a day PRN      VITAL SIGNS: Last 24 Hours  T(C): 36.7 (28 Feb 2022 04:00), Max: 37.1 (27 Feb 2022 20:16)  T(F): 98 (28 Feb 2022 04:00), Max: 98.8 (27 Feb 2022 20:16)  HR: 67 (28 Feb 2022 07:00) (59 - 70)  BP: 126/62 (28 Feb 2022 07:00) (115/57 - 168/72)  BP(mean): 89 (28 Feb 2022 07:00) (82 - 103)  RR: 14 (28 Feb 2022 07:00) (12 - 16)  SpO2: 100% (28 Feb 2022 07:00) (99% - 100%)    LABS:                        9.3    15.90 )-----------( 195      ( 28 Feb 2022 07:45 )             27.8     02-28    130<L>  |  94<L>  |  14  ----------------------------<  138<H>  3.4<L>   |  26  |  <0.5<L>    Ca    7.3<L>      28 Feb 2022 07:45  Mg     1.7     02-28    TPro  4.7<L>  /  Alb  2.0<L>  /  TBili  1.4<H>  /  DBili  x   /  AST  45<H>  /  ALT  77<H>  /  AlkPhos  228<H>  02-28    Culture - Sputum . (02.24.22 @ 09:40)    Gram Stain:   Few polymorphonuclear leukocytes per low power field  No Squamous epithelial cells per low power field  Few Gram Negative Rods per oil power field    -  Amikacin: S <=16    -  Aztreonam: R >16    -  Cefepime: I 16    -  Ceftazidime: R >16    -  Ciprofloxacin: S <=0.25    -  Gentamicin: S <=2    -  Imipenem: S <=1    -  Levofloxacin: S <=0.5    -  Meropenem: S <=1    -  Piperacillin/Tazobactam: R >64    -  Tobramycin: S <=2    Specimen Source: .Sputum Sputum    Culture Results:   Numerous Pseudomonas aeruginosa  Normal Respiratory Candida absent    Organism Identification: Pseudomonas aeruginosa    Organism: Pseudomonas aeruginosa    Method Type: MARISELA            RADIOLOGY:    Culture - Sputum . (02.09.22 @ 18:42)    Gram Stain:   Moderate polymorphonuclear leukocytes per low power field  No Squamous epithelial cells per low power field  No organisms seen per oil power field  Gram Stain:   Moderate polymorphonuclear leukocytes per low power field  No Squamous epithelial cells per low power field  No organisms seen per oil power field (02.09.22 @ 18:42)      -  Cefepime: S 4  Culture - Sputum . (02.09.22 @ 18:42)    Gram Stain:   Moderate polymorphonuclear leukocytes per low power field  No Squamous epithelial cells per low power field  No organisms seen per oil power field    -  Tobramycin: S <=2    -  Amikacin: S <=16    -  Aztreonam: S <=4    -  Meropenem: S <=1    -  Piperacillin/Tazobactam: S <=8    -  Imipenem: S <=1    -  Levofloxacin: S <=0.5    -  Ciprofloxacin: S <=0.25    -  Gentamicin: S <=2    -  Cefepime: S 4    -  Ceftazidime: S 4    Specimen Source: .Sputum Sputum    Culture Results:   Rare Pseudomonas aeruginosa  Normal Respiratory Candida absent    Organism Identification: Pseudomonas aeruginosa    Organism: Pseudomonas aeruginosa    Method Type: MARISELA      -  Ceftazidime: S 4    -  Amikacin: S <=16  Gram Stain:   Few polymorphonuclear leukocytes per low power field  No Squamous epithelial cells per low power field  Few Gram Negative Rods per oil power field (02.24.22 @ 09:40)      -  Aztreonam: S <=4    -  Tobramycin: S <=2    -  Meropenem: S <=1    -  Piperacillin/Tazobactam: S <=8    -  Imipenem: S <=1    -  Levofloxacin: S <=0.5    -  Ciprofloxacin: S <=0.25    -  Gentamicin: S <=2    Specimen Source: .Sputum Sputum    Culture Results:   Rare Pseudomonas aeruginosa  Normal Respiratory Candida absent    Organism Identification: Pseudomonas aeruginosa    Organism: Pseudomonas aeruginosa    Method Type: MARISELA    < from: Xray Kidney Ureter Bladder (02.27.22 @ 12:30) >  INTERPRETATION:  Clinical History / Reason for exam: Abdominal pain  Single image  Comparison 2/19/2022  There is decreased bowel distention. No free air is seen.    IMPRESSION: Decreased bowel distention    --- End of Report ---    < end of copied text >  < from: Xray Chest 1 View- PORTABLE-Routine (Xray Chest 1 View- PORTABLE-Routine in AM.) (02.27.22 @ 06:30) >  Impression:    Left upper lobe opacity. No pleural effusion or air leak    < end of copied text >      PHYSICAL EXAM:  GENERAL: Vented; Trach/PEG tube intact  PULMONARY: Clear to auscultation bilaterally; intubated  CARDIOVASCULAR: Regular rate and rhythm; No murmurs, rubs, or gallops  GASTROINTESTINAL: Soft, Nondistended; PEG tube intact  MUSCULOSKELETAL:  2+ Peripheral Pulses, No clubbing, cyanosis, or edema  NEUROLOGY: AAOx0  SKIN: bullae b/i hands and LEs
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
## 2022-02-28 NOTE — PROGRESS NOTE ADULT - TIME BILLING
#Progress Note Handoff  Pending (specify):  follow up ID for antibiotics antibiotics, DC planning   Family discussion: house staff updated pt family  Disposition: DC planning. final antibiotics regimen   Pt is being co-managed by Medicine and Critical care/Pulmonary team. Decisions on ventilator settings/changes, drips and ICU level of care aspects of the case has been discussed and approved  with/by  ICU/Pulmonary attending.

## 2022-03-01 VITALS
DIASTOLIC BLOOD PRESSURE: 63 MMHG | SYSTOLIC BLOOD PRESSURE: 141 MMHG | RESPIRATION RATE: 23 BRPM | HEART RATE: 62 BPM | OXYGEN SATURATION: 100 %

## 2022-03-01 LAB
ALBUMIN SERPL ELPH-MCNC: 2 G/DL — LOW (ref 3.5–5.2)
ALP SERPL-CCNC: 250 U/L — HIGH (ref 30–115)
ALT FLD-CCNC: 90 U/L — HIGH (ref 0–41)
ANION GAP SERPL CALC-SCNC: 11 MMOL/L — SIGNIFICANT CHANGE UP (ref 7–14)
AST SERPL-CCNC: 46 U/L — HIGH (ref 0–41)
BILIRUB SERPL-MCNC: 1.1 MG/DL — SIGNIFICANT CHANGE UP (ref 0.2–1.2)
BUN SERPL-MCNC: 17 MG/DL — SIGNIFICANT CHANGE UP (ref 10–20)
CALCIUM SERPL-MCNC: 7.4 MG/DL — LOW (ref 8.5–10.1)
CHLORIDE SERPL-SCNC: 93 MMOL/L — LOW (ref 98–110)
CO2 SERPL-SCNC: 27 MMOL/L — SIGNIFICANT CHANGE UP (ref 17–32)
CREAT SERPL-MCNC: <0.5 MG/DL — LOW (ref 0.7–1.5)
EGFR: 112 ML/MIN/1.73M2 — SIGNIFICANT CHANGE UP
GAS PNL BLDA: SIGNIFICANT CHANGE UP
GLUCOSE BLDC GLUCOMTR-MCNC: 129 MG/DL — HIGH (ref 70–99)
GLUCOSE BLDC GLUCOMTR-MCNC: 168 MG/DL — HIGH (ref 70–99)
GLUCOSE BLDC GLUCOMTR-MCNC: 180 MG/DL — HIGH (ref 70–99)
GLUCOSE SERPL-MCNC: 130 MG/DL — HIGH (ref 70–99)
HCT VFR BLD CALC: 28.3 % — LOW (ref 37–47)
HGB BLD-MCNC: 9.4 G/DL — LOW (ref 12–16)
MAGNESIUM SERPL-MCNC: 2.3 MG/DL — SIGNIFICANT CHANGE UP (ref 1.8–2.4)
MCHC RBC-ENTMCNC: 29.6 PG — SIGNIFICANT CHANGE UP (ref 27–31)
MCHC RBC-ENTMCNC: 33.2 G/DL — SIGNIFICANT CHANGE UP (ref 32–37)
MCV RBC AUTO: 89 FL — SIGNIFICANT CHANGE UP (ref 81–99)
NRBC # BLD: 0 /100 WBCS — SIGNIFICANT CHANGE UP (ref 0–0)
PLATELET # BLD AUTO: 199 K/UL — SIGNIFICANT CHANGE UP (ref 130–400)
POTASSIUM SERPL-MCNC: 3.5 MMOL/L — SIGNIFICANT CHANGE UP (ref 3.5–5)
POTASSIUM SERPL-SCNC: 3.5 MMOL/L — SIGNIFICANT CHANGE UP (ref 3.5–5)
PROT SERPL-MCNC: 4.9 G/DL — LOW (ref 6–8)
RBC # BLD: 3.18 M/UL — LOW (ref 4.2–5.4)
RBC # FLD: 17.1 % — HIGH (ref 11.5–14.5)
SODIUM SERPL-SCNC: 131 MMOL/L — LOW (ref 135–146)
WBC # BLD: 15.17 K/UL — HIGH (ref 4.8–10.8)
WBC # FLD AUTO: 15.17 K/UL — HIGH (ref 4.8–10.8)

## 2022-03-01 PROCEDURE — 99232 SBSQ HOSP IP/OBS MODERATE 35: CPT

## 2022-03-01 PROCEDURE — 99239 HOSP IP/OBS DSCHRG MGMT >30: CPT

## 2022-03-01 PROCEDURE — 71045 X-RAY EXAM CHEST 1 VIEW: CPT | Mod: 26

## 2022-03-01 RX ORDER — SALICYLIC ACID 0.5 %
1 CLEANSER (GRAM) TOPICAL
Qty: 0 | Refills: 0 | DISCHARGE
Start: 2022-03-01

## 2022-03-01 RX ORDER — POTASSIUM CHLORIDE 20 MEQ
40 PACKET (EA) ORAL ONCE
Refills: 0 | Status: COMPLETED | OUTPATIENT
Start: 2022-03-01 | End: 2022-03-01

## 2022-03-01 RX ORDER — PREGABALIN 225 MG/1
1 CAPSULE ORAL
Qty: 0 | Refills: 0 | DISCHARGE

## 2022-03-01 RX ORDER — BACITRACIN ZINC 500 UNIT/G
1 OINTMENT IN PACKET (EA) TOPICAL
Qty: 0 | Refills: 0 | DISCHARGE

## 2022-03-01 RX ORDER — POTASSIUM CHLORIDE 20 MEQ
1 PACKET (EA) ORAL
Qty: 0 | Refills: 0 | DISCHARGE

## 2022-03-01 RX ORDER — MEROPENEM 1 G/30ML
500 INJECTION INTRAVENOUS
Qty: 0 | Refills: 0 | DISCHARGE
Start: 2022-03-01 | End: 2022-03-08

## 2022-03-01 RX ORDER — COLLAGENASE CLOSTRIDIUM HIST. 250 UNIT/G
1 OINTMENT (GRAM) TOPICAL
Qty: 0 | Refills: 0 | DISCHARGE
Start: 2022-03-01

## 2022-03-01 RX ORDER — PANTOPRAZOLE SODIUM 20 MG/1
40 TABLET, DELAYED RELEASE ORAL
Qty: 0 | Refills: 0 | DISCHARGE
Start: 2022-03-01

## 2022-03-01 RX ORDER — FOLIC ACID 0.8 MG
1 TABLET ORAL
Qty: 0 | Refills: 0 | DISCHARGE

## 2022-03-01 RX ORDER — MEROPENEM 1 G/30ML
500 INJECTION INTRAVENOUS
Qty: 0 | Refills: 0 | DISCHARGE
Start: 2022-03-01

## 2022-03-01 RX ORDER — MECLIZINE HCL 12.5 MG
1 TABLET ORAL
Qty: 0 | Refills: 0 | DISCHARGE

## 2022-03-01 RX ORDER — ENOXAPARIN SODIUM 100 MG/ML
40 INJECTION SUBCUTANEOUS
Qty: 0 | Refills: 0 | DISCHARGE
Start: 2022-03-01

## 2022-03-01 RX ADMIN — Medication 1: at 06:52

## 2022-03-01 RX ADMIN — Medication 1 APPLICATION(S): at 05:10

## 2022-03-01 RX ADMIN — MEROPENEM 100 MILLIGRAM(S): 1 INJECTION INTRAVENOUS at 05:11

## 2022-03-01 RX ADMIN — Medication 40 MILLIGRAM(S): at 05:11

## 2022-03-01 RX ADMIN — Medication 1: at 12:29

## 2022-03-01 RX ADMIN — Medication 12.5 MILLIGRAM(S): at 05:10

## 2022-03-01 RX ADMIN — PANTOPRAZOLE SODIUM 40 MILLIGRAM(S): 20 TABLET, DELAYED RELEASE ORAL at 12:07

## 2022-03-01 RX ADMIN — ENOXAPARIN SODIUM 40 MILLIGRAM(S): 100 INJECTION SUBCUTANEOUS at 12:07

## 2022-03-01 RX ADMIN — CHLORHEXIDINE GLUCONATE 1 APPLICATION(S): 213 SOLUTION TOPICAL at 05:10

## 2022-03-01 RX ADMIN — LACTULOSE 20 GRAM(S): 10 SOLUTION ORAL at 05:10

## 2022-03-01 RX ADMIN — POLYETHYLENE GLYCOL 3350 17 GRAM(S): 17 POWDER, FOR SOLUTION ORAL at 05:11

## 2022-03-01 RX ADMIN — Medication 1 APPLICATION(S): at 05:11

## 2022-03-01 RX ADMIN — MEROPENEM 100 MILLIGRAM(S): 1 INJECTION INTRAVENOUS at 14:20

## 2022-03-01 RX ADMIN — CHLORHEXIDINE GLUCONATE 15 MILLILITER(S): 213 SOLUTION TOPICAL at 05:10

## 2022-03-01 NOTE — PROGRESS NOTE ADULT - ASSESSMENT
· Assessment	  89 y/o F w/ PMH/o HTN (as per son at bedside) presenting to the hospital BIBA after experiencing respiratory distress. According to the patients family, the patient was being fed when she began choking and had difficulty breathing. Pt was bag masked by EMS and transported to the hospital. According to the family, the patient began to experience diffuse bullae across her entire body as well as ulcerations in her mouth for the past month. The family denied noticing fever or chills.  In the ED, patient was tachypneic and hypoxic,  intubated for airway protection. Pt was found to hypothermic, started on warming blanket. Pt started on IV levophed. S/p IV vanc/ cefepime.    IMPRESSION;  Diffuse bullae : s/p Bx 2/9 > bullous pemphigoid  MSOF  JILL > resolved  Aspiration with probable bacterial PNA RLL  2/24 Sputum P aeruginosa  2/23 BCX NG  2/8 BCx NG  2/9 UCx NG  2/18 CT : no PE    COVID-19 NG 1/9  COVID-19 positive 1/15  Ddimers 3908  Ferritin 521  Procal 1.14    2/23 S/p trach  Pt was in the early viral replicative phase based on the timeline/onset of symptoms.     s/p Dexamethason      RECOMMENDATIONS;  D/c Zosyn  Meropenem 500 mg iv q6h   Anticoagulation as per team.   Off loading to prevent pressure sores and preventive measures to avoid aspiration   If any questions , please call [phone] or send a message on [facility] teams  Please update ID in real time with any pertinent new laboratory /microbiological/radiographically findings or a change in the clinical characteristics   Prognosis is extremely poor given her advanced age and poor nutritional status

## 2022-03-01 NOTE — DISCHARGE NOTE PROVIDER - NSDCCPCAREPLAN_GEN_ALL_CORE_FT
PRINCIPAL DISCHARGE DIAGNOSIS  Diagnosis: Acute respiratory failure with hypoxia  Assessment and Plan of Treatment: Acute respiratory failure (ARF) is a condition that happens when your lungs cannot get enough oxygen into your blood. ARF can also happen when your lungs cannot get the carbon dioxide out of your blood. A buildup of carbon dioxide in your blood can cause damage to your organs. The decrease in oxygen and the buildup of carbon dioxide can happen at the same time. Acute respiratory failure may develop in minutes, hours, or days.  You were admitted for Acute Respiratory Failure with Hypoxia secondary to aspiration. You are currently being treated with an antibiotic called Meropenem. You will be discharged on Meropenem. Please follow up with your primary care doctor in 1 - 2 weeks.      SECONDARY DISCHARGE DIAGNOSES  Diagnosis: JILL (acute kidney injury)  Assessment and Plan of Treatment: During your hospital stay you were found to have an Acute Kidney Injury. The exact cause of this injury is unknown but it is likely that you were somewhat dehydrated, causing lack of blood flow to your kidneys and causing some mild reversible kidney injury. The Blood levels have returned back to your baseline.  Follow up with your PMD regarding this issue and if there is any further assessment needed to follow up and or medication adjustments needed.  Keep Hydrated as directed by your physician to prevent future occurences of any Kidney Injury.   SEEK IMMEDIATE MEDICAL CARE IF YOU HAVE ANY OF THE FOLLOWING SYMPTOMS: chest pain, shortness of breath, abdominal pain, sweating, vomiting, blood in vomit/bowel movements/urine, dizziness/lightheadedness, weakness or numbness to face/arm/leg, trouble speaking or understanding, facial droop.    Diagnosis: Bullous pemphigoid  Assessment and Plan of Treatment:     Diagnosis: Torsades de pointes  Assessment and Plan of Treatment: You had an episode of Torsades on 2/14. Torsades de Pointes is an irregular and dangerous heart rhythm. Please follow up with cardiologist Dr. Karson Anaya in 1 week.     PRINCIPAL DISCHARGE DIAGNOSIS  Diagnosis: Acute respiratory failure with hypoxia  Assessment and Plan of Treatment: Acute respiratory failure (ARF) is a condition that happens when your lungs cannot get enough oxygen into your blood. ARF can also happen when your lungs cannot get the carbon dioxide out of your blood. A buildup of carbon dioxide in your blood can cause damage to your organs. The decrease in oxygen and the buildup of carbon dioxide can happen at the same time. Acute respiratory failure may develop in minutes, hours, or days.  You were admitted for Acute Respiratory Failure with Hypoxia secondary to aspiration. You are currently being treated with an antibiotic called Meropenem. You will be discharged on Meropenem. Please follow up with your primary care doctor in 1 - 2 weeks.      SECONDARY DISCHARGE DIAGNOSES  Diagnosis: JILL (acute kidney injury)  Assessment and Plan of Treatment: During your hospital stay you were found to have an Acute Kidney Injury. The exact cause of this injury is unknown but it is likely that you were somewhat dehydrated, causing lack of blood flow to your kidneys and causing some mild reversible kidney injury. The Blood levels have returned back to your baseline.  Follow up with your PMD regarding this issue and if there is any further assessment needed to follow up and or medication adjustments needed.  Keep Hydrated as directed by your physician to prevent future occurences of any Kidney Injury.   SEEK IMMEDIATE MEDICAL CARE IF YOU HAVE ANY OF THE FOLLOWING SYMPTOMS: chest pain, shortness of breath, abdominal pain, sweating, vomiting, blood in vomit/bowel movements/urine, dizziness/lightheadedness, weakness or numbness to face/arm/leg, trouble speaking or understanding, facial droop.    Diagnosis: Bullous pemphigoid  Assessment and Plan of Treatment: Bullous pemphigoid is a rare skin condition that causes large, fluid-filled blisters. They develop on areas of skin that often flex — such as the lower abdomen, upper thighs or armpits. Bullous pemphigoid is most common in older adults.  Bullous pemphigoid occurs when your immune system attacks a thin layer of tissue below your outer layer of skin. The reason for this abnormal immune response is unknown, although it sometimes can be triggered by taking certain medications.  Bullous pemphigoid often goes away on its own in a few months, but may take as many as five years to resolve. Treatment usually helps heal the blisters and ease any itching. It may include corticosteroid medications, such as prednisone, and other drugs that suppress the immune system. Bullous pemphigoid can be life-threatening, especially for older people who are already in poor health.  Symptoms  The signs and symptoms of bullous pemphigoid may include:  Itching skin, weeks or months before blisters form  Large blisters that don't easily rupture when touched, often along creases or folds in the skin  Skin around the blisters that is normal, reddish or darker than normal  Eczema or a hive-like rash  Small blisters or sores in the mouth or other mucous membranes (benign mucous membrane pemphigoid)  When to see a doctor  See your doctor if you develop:  Unexplained blistering  Blisters on your eyes  Signs of infection  Please follow up Dermatologist Dr. Donovan Schneider in 1-2 weeks.    Diagnosis: Torsades de pointes  Assessment and Plan of Treatment: You had an episode of Torsades on 2/14. Torsades de Pointes is an irregular and dangerous heart rhythm. Please follow up with cardiologist Dr. Karson Anaya in 1 week.     PRINCIPAL DISCHARGE DIAGNOSIS  Diagnosis: Acute respiratory failure with hypoxia  Assessment and Plan of Treatment: Acute respiratory failure (ARF) is a condition that happens when your lungs cannot get enough oxygen into your blood. ARF can also happen when your lungs cannot get the carbon dioxide out of your blood. A buildup of carbon dioxide in your blood can cause damage to your organs. The decrease in oxygen and the buildup of carbon dioxide can happen at the same time. Acute respiratory failure may develop in minutes, hours, or days.  You were admitted for Acute Respiratory Failure with Hypoxia secondary to aspiration. You are currently being treated with an antibiotic called Meropenem. You will be discharged on Meropenem. Please follow up with your primary care doctor in 1 - 2 weeks.  Meropenum 500 mg q6 hr, last dose 3/8/2022      SECONDARY DISCHARGE DIAGNOSES  Diagnosis: JLIL (acute kidney injury)  Assessment and Plan of Treatment: During your hospital stay you were found to have an Acute Kidney Injury. The exact cause of this injury is unknown but it is likely that you were somewhat dehydrated, causing lack of blood flow to your kidneys and causing some mild reversible kidney injury. The Blood levels have returned back to your baseline.  Follow up with your PMD regarding this issue and if there is any further assessment needed to follow up and or medication adjustments needed.  Keep Hydrated as directed by your physician to prevent future occurences of any Kidney Injury.   SEEK IMMEDIATE MEDICAL CARE IF YOU HAVE ANY OF THE FOLLOWING SYMPTOMS: chest pain, shortness of breath, abdominal pain, sweating, vomiting, blood in vomit/bowel movements/urine, dizziness/lightheadedness, weakness or numbness to face/arm/leg, trouble speaking or understanding, facial droop.    Diagnosis: Bullous pemphigoid  Assessment and Plan of Treatment: Bullous pemphigoid is a rare skin condition that causes large, fluid-filled blisters. They develop on areas of skin that often flex — such as the lower abdomen, upper thighs or armpits. Bullous pemphigoid is most common in older adults.  Bullous pemphigoid occurs when your immune system attacks a thin layer of tissue below your outer layer of skin. The reason for this abnormal immune response is unknown, although it sometimes can be triggered by taking certain medications.  Bullous pemphigoid often goes away on its own in a few months, but may take as many as five years to resolve. Treatment usually helps heal the blisters and ease any itching. It may include corticosteroid medications, such as prednisone, and other drugs that suppress the immune system. Bullous pemphigoid can be life-threatening, especially for older people who are already in poor health.  Symptoms  The signs and symptoms of bullous pemphigoid may include:  Itching skin, weeks or months before blisters form  Large blisters that don't easily rupture when touched, often along creases or folds in the skin  Skin around the blisters that is normal, reddish or darker than normal  Eczema or a hive-like rash  Small blisters or sores in the mouth or other mucous membranes (benign mucous membrane pemphigoid)  When to see a doctor  See your doctor if you develop:  Unexplained blistering  Blisters on your eyes  Signs of infection  Please follow up Dermatologist Dr. Donovan Schneider in 1-2 weeks.  Please jackeline steroids 30 mg x5 days starting 3/2, 20 mg x5 days then 10 mg x 5 days    Diagnosis: Torsades de pointes  Assessment and Plan of Treatment: You had an episode of Torsades on 2/14. Torsades de Pointes is an irregular and dangerous heart rhythm. Please follow up with cardiologist Dr. Karson Anaya in 1 week.

## 2022-03-01 NOTE — PROGRESS NOTE ADULT - GUM GEN PE MLT EXAM PC
Normal genitalia; no lesions; no discharge

## 2022-03-01 NOTE — PROGRESS NOTE ADULT - CARDIOVASCULAR
detailed exam
Regular rate & rhythm, normal S1, S2; no murmurs, gallops or rubs; no S3, S4
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
## 2022-03-01 NOTE — DISCHARGE NOTE PROVIDER - ATTENDING DISCHARGE PHYSICAL EXAMINATION:
Attending attestation  Attending DC note  Pt seen and examined at bedside. pt vented. Continue to wean o2. Continue antibiotics   vitals, labs, exam stable  Hospital course as above.  Plan dw pt and agreed to plan  Medically cleared for DC. Med recc completed.  ELVIRA resident. Spent 32 mins on case

## 2022-03-01 NOTE — DISCHARGE NOTE NURSING/CASE MANAGEMENT/SOCIAL WORK - PATIENT PORTAL LINK FT
You can access the FollowMyHealth Patient Portal offered by [facility] by registering at the following website: [email or web address]. By joining [facility]’s FollowMyHealth portal, you will also be able to view your health information using other applications (apps) compatible with our system.

## 2022-03-01 NOTE — PROGRESS NOTE ADULT - MS EXT PE MLT D E PC
no cyanosis
no cyanosis
no pedal edema
pedal edema
no cyanosis
pedal edema
no cyanosis/no pedal edema

## 2022-03-01 NOTE — DISCHARGE NOTE PROVIDER - CARE PROVIDER_API CALL
Srinath Schwartz (DO)  Medicine  [address] [facility], [address]st [place] [address]  Phone: [phone]  Fax: [phone]  Follow Up Time: 1 week    Karson Aanya)  Cardiovascular Disease; Internal Medicine  [address] [place] [address]  Phone: [phone]  Fax: [phone]  Follow Up Time: 1 week    Donovan Schneider)  Dermatology; Internal Medicine  [address] [place] [address]  Phone: [phone]  Fax: [phone]  Follow Up Time: 1 week   Srinath Schwartz (DO)  Medicine  [address] [facility], [address]st [place] [address]  Phone: [phone]  Fax: [phone]  Follow Up Time: 1 week    Karson Anaya)  Cardiovascular Disease; Internal Medicine  [address] [place] [address]  Phone: [phone]  Fax: [phone]  Follow Up Time: 1 week    Donovan Schneider)  Dermatology; Internal Medicine  [address] [place] [address]  Phone: [phone]  Fax: [phone]  Follow Up Time: 1 week    Joaquín Henry)  Infectious Disease; Internal Medicine  [address] [place] [address]  Phone: [phone]  Fax: [phone]  Follow Up Time: Routine

## 2022-03-01 NOTE — PROGRESS NOTE ADULT - PROVIDER SPECIALTY LIST ADULT
Burn
CCU
Pulmonology
Burn
CCU
CT Surgery
Cardiology
Infectious Disease
Internal Medicine
Nephrology
Nutrition Support
Pulmonology
Thoracic Surgery
CT Surgery
Hospitalist
Internal Medicine
Nephrology
Pulmonology
Infectious Disease
Internal Medicine
Infectious Disease

## 2022-03-01 NOTE — DISCHARGE NOTE PROVIDER - CARE PROVIDERS DIRECT ADDRESSES
[email or web address],DirectAddress_Unknown,DirectAddress_Unknown ,[email or web address],DirectAddress_Unknown,DirectAddress_Unknown,DirectAddress_Unknown
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
## 2022-03-01 NOTE — DISCHARGE NOTE PROVIDER - NSDCMRMEDTOKEN_GEN_ALL_CORE_FT
collagenase 250 units/g topical ointment: 1 application topically 2 times a day  collagenase 250 units/g topical ointment: 1 application topically 2 times a day  enoxaparin: 40 milligram(s) subcutaneous once a day  folic acid 1 mg oral tablet: 1 tab(s) orally once a day  hydroCHLOROthiazide 12.5 mg oral capsule: 1 cap(s) orally once a day  meropenem: 500 milligram(s) intravenous every 6 hours  last dose 3/8/22  pantoprazole 40 mg intravenous injection: 40 milligram(s) intravenous once a day  predniSONE 10 mg oral tablet: 3 tabs x 5 days starting 3/2, 2 tabs x 5 days starting 3/7, 1 tab x5 days starting 3/12  silver sulfADIAZINE 1% topical cream: 1 application topically 2 times a day  vitamin A and D topical ointment: 1 application topically once a day  Vitamin B-12 1000 mcg oral tablet: 1 tab(s) orally once a day
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
## 2022-03-01 NOTE — DISCHARGE NOTE PROVIDER - PROVIDER TOKENS
PROVIDER:[TOKEN:[[id number]],FOLLOWUP:[1 week]],PROVIDER:[TOKEN:[[id number]],FOLLOWUP:[1 week]],PROVIDER:[TOKEN:[[id number]],FOLLOWUP:[1 week]] PROVIDER:[TOKEN:[[id number]],FOLLOWUP:[1 week]],PROVIDER:[TOKEN:[[id number]],FOLLOWUP:[1 week]],PROVIDER:[TOKEN:[[id number]],FOLLOWUP:[1 week]],PROVIDER:[TOKEN:[[id number]],FOLLOWUP:[Routine]]

## 2022-03-01 NOTE — PROGRESS NOTE ADULT - SUBJECTIVE AND OBJECTIVE BOX
ARI MA 88y Female  MRN#: [id number]   CODE STATUS: FULL      SUBJECTIVE  Patient is a 88y old Female who presents with a chief complaint of covid (26 Feb 2022 11:32)    Today is hospital day 20d,   INTERVAL HPI/OVERNIGHT EVENTS:    Examined pt at bedside this AM. There were no acute events overnight.    OBJECTIVE  PAST MEDICAL & SURGICAL HISTORY    ALLERGIES:  No Known Allergies    HOME MEDICATIONS:  Home Medications:    MEDICATIONS:  STANDING MEDICATIONS  chlorhexidine 0.12% Liquid 15 milliLiter(s) Oral Mucosa every 12 hours  chlorhexidine 4% Liquid 1 Application(s) Topical <User Schedule>  collagenase Ointment 1 Application(s) Topical two times a day  collagenase Ointment 1 Application(s) Topical two times a day  dextrose 40% Gel 15 Gram(s) Oral once  dextrose 5%. 1000 milliLiter(s) (50 mL/Hr) IV Continuous <Continuous>  dextrose 5%. 1000 milliLiter(s) (100 mL/Hr) IV Continuous <Continuous>  dextrose 50% Injectable 25 Gram(s) IV Push once  dextrose 50% Injectable 12.5 Gram(s) IV Push once  dextrose 50% Injectable 25 Gram(s) IV Push once  enoxaparin Injectable 40 milliGRAM(s) SubCutaneous daily  glucagon  Injectable 1 milliGRAM(s) IntraMuscular once  hydrochlorothiazide 12.5 milliGRAM(s) Oral daily  influenza  Vaccine (HIGH DOSE) 0.7 milliLiter(s) IntraMuscular once  insulin lispro (ADMELOG) corrective regimen sliding scale   SubCutaneous every 6 hours  lactulose Syrup 20 Gram(s) Oral two times a day  meropenem  IVPB      meropenem  IVPB 1000 milliGRAM(s) IV Intermittent every 8 hours  pantoprazole  Injectable 40 milliGRAM(s) IV Push daily  polyethylene glycol 3350 17 Gram(s) Oral every 12 hours  predniSONE   Tablet 40 milliGRAM(s) Oral daily  silver sulfADIAZINE 1% Cream 1 Application(s) Topical two times a day  vitamin A &amp; D Ointment 1 Application(s) Topical daily    PRN MEDICATIONS  fentaNYL    Injectable 12.5 MICROGram(s) IV Push every 8 hours PRN  lactulose Retention Enema 200 Gram(s) Rectal two times a day PRN      VITAL SIGNS: Last 24 Hours  T(C): 36.6 (28 Feb 2022 16:00), Max: 36.7 (28 Feb 2022 08:00)  T(F): 97.9 (28 Feb 2022 16:00), Max: 98.1 (28 Feb 2022 08:00)  HR: 75 (01 Mar 2022 06:00) (66 - 84)  BP: 143/64 (01 Mar 2022 06:00) (119/56 - 174/74)  BP(mean): 92 (01 Mar 2022 06:00) (81 - 106)  RR: --  SpO2: 100% (01 Mar 2022 06:00) (97% - 100%)    LABS:                        9.4    15.17 )-----------( 199      ( 01 Mar 2022 04:40 )             28.3     03-01    131<L>  |  93<L>  |  17  ----------------------------<  130<H>  3.5   |  27  |  <0.5<L>    Ca    7.4<L>      01 Mar 2022 04:40  Mg     2.3     03-01    TPro  4.9<L>  /  Alb  2.0<L>  /  TBili  1.1  /  DBili  x   /  AST  46<H>  /  ALT  90<H>  /  AlkPhos  250<H>  03-01        ABG - ( 01 Mar 2022 04:53 )  pH, Arterial: 7.54  pH, Blood: x     /  pCO2: 36    /  pO2: 126   / HCO3: 31    / Base Excess: 7.8   /  SaO2: 99.6          RADIOLOGY:    < from: Xray Kidney Ureter Bladder (02.27.22 @ 12:30) >  INTERPRETATION:  Clinical History / Reason for exam: Abdominal pain  Single image  Comparison 2/19/2022  There is decreased bowel distention. No free air is seen.    IMPRESSION: Decreased bowel distention    --- End of Report ---    < end of copied text >  < from: Xray Chest 1 View- PORTABLE-Routine (Xray Chest 1 View- PORTABLE-Routine in AM.) (02.27.22 @ 06:30) >  Impression:    Left upper lobe opacity. No pleural effusion or air leak        --- End of Report ---    < end of copied text >      PHYSICAL EXAM:  GENERAL: Vented; Trach/PEG tube intact  PULMONARY: Clear to auscultation bilaterally; intubated  CARDIOVASCULAR: Regular rate and rhythm; No murmurs, rubs, or gallops  GASTROINTESTINAL: Soft, Nondistended; PEG tube intact  MUSCULOSKELETAL:  2+ Peripheral Pulses, No clubbing, cyanosis, or edema  NEUROLOGY: AAOx0  SKIN: bullae b/i hands and LEs

## 2022-03-01 NOTE — PROGRESS NOTE ADULT - EXTREMITIES
detailed exam
No cyanosis, clubbing or edema
detailed exam
No cyanosis, clubbing or edema
detailed exam
detailed exam

## 2022-03-01 NOTE — PROGRESS NOTE ADULT - SUBJECTIVE AND OBJECTIVE BOX
ARI MA  88y, Female    All available historical data reviewed    OVERNIGHT EVENTS:  no fevers'fio2 30%  no pressors    ROS:  unable to obtain history secondary to patient's mental status and/or sedation   VITALS:  T(F): 97.9, Max: 97.9 (02-28-22 @ 16:00)  HR: 67  BP: 140/65  RR: 22Vital Signs Last 24 Hrs  T(C): 36.6 (01 Mar 2022 08:00), Max: 36.6 (28 Feb 2022 16:00)  T(F): 97.9 (01 Mar 2022 08:00), Max: 97.9 (28 Feb 2022 16:00)  HR: 67 (01 Mar 2022 13:00) (66 - 79)  BP: 140/65 (01 Mar 2022 13:00) (131/60 - 174/74)  BP(mean): 93 (01 Mar 2022 13:00) (86 - 106)  RR: 22 (01 Mar 2022 13:00) (22 - 23)  SpO2: 100% (01 Mar 2022 13:00) (97% - 100%)    TESTS & MEASUREMENTS:                        9.4    15.17 )-----------( 199      ( 01 Mar 2022 04:40 )             28.3     03-01    131<L>  |  93<L>  |  17  ----------------------------<  130<H>  3.5   |  27  |  <0.5<L>    Ca    7.4<L>      01 Mar 2022 04:40  Mg     2.3     03-01    TPro  4.9<L>  /  Alb  2.0<L>  /  TBili  1.1  /  DBili  x   /  AST  46<H>  /  ALT  90<H>  /  AlkPhos  250<H>  03-01    LIVER FUNCTIONS - ( 01 Mar 2022 04:40 )  Alb: 2.0 g/dL / Pro: 4.9 g/dL / ALK PHOS: 250 U/L / ALT: 90 U/L / AST: 46 U/L / GGT: x             Culture - Sputum (collected 02-24-22 @ 09:40)  Source: .Sputum Sputum  Gram Stain (02-24-22 @ 23:44):    Few polymorphonuclear leukocytes per low power field    No Squamous epithelial cells per low power field    Few Gram Negative Rods per oil power field  Final Report (02-26-22 @ 20:46):    Numerous Pseudomonas aeruginosa    Normal Respiratory Candida absent  Organism: Pseudomonas aeruginosa (02-26-22 @ 20:46)  Organism: Pseudomonas aeruginosa (02-26-22 @ 20:46)      -  Amikacin: S <=16      -  Aztreonam: R >16      -  Cefepime: I 16      -  Ceftazidime: R >16      -  Ciprofloxacin: S <=0.25      -  Gentamicin: S <=2      -  Imipenem: S <=1      -  Levofloxacin: S <=0.5      -  Meropenem: S <=1      -  Piperacillin/Tazobactam: R >64      -  Tobramycin: S <=2      Method Type: MARISELA    Culture - Blood (collected 02-23-22 @ 04:30)  Source: .Blood Blood-Peripheral  Final Report (02-28-22 @ 23:01):    No Growth Final            RADIOLOGY & ADDITIONAL TESTS:  Personal review of radiological diagnostics performed  Echo and EKG results noted when applicable.     MEDICATIONS:  chlorhexidine 0.12% Liquid 15 milliLiter(s) Oral Mucosa every 12 hours  chlorhexidine 4% Liquid 1 Application(s) Topical <User Schedule>  collagenase Ointment 1 Application(s) Topical two times a day  collagenase Ointment 1 Application(s) Topical two times a day  dextrose 40% Gel 15 Gram(s) Oral once  dextrose 5%. 1000 milliLiter(s) IV Continuous <Continuous>  dextrose 5%. 1000 milliLiter(s) IV Continuous <Continuous>  dextrose 50% Injectable 25 Gram(s) IV Push once  dextrose 50% Injectable 12.5 Gram(s) IV Push once  dextrose 50% Injectable 25 Gram(s) IV Push once  enoxaparin Injectable 40 milliGRAM(s) SubCutaneous daily  fentaNYL    Injectable 12.5 MICROGram(s) IV Push every 8 hours PRN  glucagon  Injectable 1 milliGRAM(s) IntraMuscular once  hydrochlorothiazide 12.5 milliGRAM(s) Oral daily  influenza  Vaccine (HIGH DOSE) 0.7 milliLiter(s) IntraMuscular once  insulin lispro (ADMELOG) corrective regimen sliding scale   SubCutaneous every 6 hours  lactulose Retention Enema 200 Gram(s) Rectal two times a day PRN  lactulose Syrup 20 Gram(s) Oral two times a day  meropenem  IVPB      meropenem  IVPB 1000 milliGRAM(s) IV Intermittent every 8 hours  pantoprazole  Injectable 40 milliGRAM(s) IV Push daily  polyethylene glycol 3350 17 Gram(s) Oral every 12 hours  potassium chloride   Powder 40 milliEquivalent(s) Oral once  predniSONE   Tablet 40 milliGRAM(s) Oral daily  silver sulfADIAZINE 1% Cream 1 Application(s) Topical two times a day  vitamin A &amp; D Ointment 1 Application(s) Topical daily      ANTIBIOTICS:  meropenem  IVPB      meropenem  IVPB 1000 milliGRAM(s) IV Intermittent every 8 hours
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
## 2022-03-01 NOTE — PROGRESS NOTE ADULT - [facility]
no new murmurs

## 2022-03-04 DIAGNOSIS — J96.01 ACUTE RESPIRATORY FAILURE WITH HYPOXIA: ICD-10-CM

## 2022-03-04 DIAGNOSIS — I96 GANGRENE, NOT ELSEWHERE CLASSIFIED: ICD-10-CM

## 2022-03-04 DIAGNOSIS — J15.1 PNEUMONIA DUE TO PSEUDOMONAS: ICD-10-CM

## 2022-03-04 DIAGNOSIS — E83.39 OTHER DISORDERS OF PHOSPHORUS METABOLISM: ICD-10-CM

## 2022-03-04 DIAGNOSIS — R74.01 ELEVATION OF LEVELS OF LIVER TRANSAMINASE LEVELS: ICD-10-CM

## 2022-03-04 DIAGNOSIS — E83.51 HYPOCALCEMIA: ICD-10-CM

## 2022-03-04 DIAGNOSIS — E86.0 DEHYDRATION: ICD-10-CM

## 2022-03-04 DIAGNOSIS — L12.0 BULLOUS PEMPHIGOID: ICD-10-CM

## 2022-03-04 DIAGNOSIS — R68.0 HYPOTHERMIA, NOT ASSOCIATED WITH LOW ENVIRONMENTAL TEMPERATURE: ICD-10-CM

## 2022-03-04 DIAGNOSIS — E87.6 HYPOKALEMIA: ICD-10-CM

## 2022-03-04 DIAGNOSIS — U07.1 COVID-19: ICD-10-CM

## 2022-03-04 DIAGNOSIS — J69.0 PNEUMONITIS DUE TO INHALATION OF FOOD AND VOMIT: ICD-10-CM

## 2022-03-04 DIAGNOSIS — E87.70 FLUID OVERLOAD, UNSPECIFIED: ICD-10-CM

## 2022-03-04 DIAGNOSIS — D69.6 THROMBOCYTOPENIA, UNSPECIFIED: ICD-10-CM

## 2022-03-04 DIAGNOSIS — I47.2 VENTRICULAR TACHYCARDIA: ICD-10-CM

## 2022-03-04 DIAGNOSIS — A41.9 SEPSIS, UNSPECIFIED ORGANISM: ICD-10-CM

## 2022-03-04 DIAGNOSIS — E83.42 HYPOMAGNESEMIA: ICD-10-CM

## 2022-03-04 DIAGNOSIS — N17.0 ACUTE KIDNEY FAILURE WITH TUBULAR NECROSIS: ICD-10-CM

## 2022-03-04 DIAGNOSIS — L89.324 PRESSURE ULCER OF LEFT BUTTOCK, STAGE 4: ICD-10-CM

## 2022-03-04 DIAGNOSIS — J12.82 PNEUMONIA DUE TO CORONAVIRUS DISEASE 2019: ICD-10-CM

## 2022-03-04 DIAGNOSIS — R65.21 SEVERE SEPSIS WITH SEPTIC SHOCK: ICD-10-CM

## 2022-03-04 DIAGNOSIS — E87.2 ACIDOSIS: ICD-10-CM

## 2022-03-04 DIAGNOSIS — E87.0 HYPEROSMOLALITY AND HYPERNATREMIA: ICD-10-CM

## 2022-03-04 DIAGNOSIS — L89.154 PRESSURE ULCER OF SACRAL REGION, STAGE 4: ICD-10-CM

## 2022-03-04 DIAGNOSIS — R64 CACHEXIA: ICD-10-CM

## 2022-03-04 DIAGNOSIS — R73.9 HYPERGLYCEMIA, UNSPECIFIED: ICD-10-CM

## 2022-03-04 LAB — GLUCOSE BLDC GLUCOMTR-MCNC: 190 MG/DL — HIGH (ref 70–99)

## 2022-12-27 NOTE — PATIENT PROFILE ADULT - FUNCTIONAL ASSESSMENT - DAILY ACTIVITY ASSESSMENT TYPE
Rx Refill Note  Requested Prescriptions     Pending Prescriptions Disp Refills   • olmesartan (Benicar) 20 MG tablet 30 tablet 6     Sig: Take 1 tablet by mouth Daily.      Last office visit with prescribing clinician: 12/19/2022   Next office visit with prescribing clinician: 1/12/2023                         Would you like a call back once the refill request has been completed: [] Yes [] No    If the office needs to give you a call back, can they leave a voicemail: [] Yes [] No    Cally Lockhart RN  12/27/22, 16:44 CST     Admission

## 2023-10-11 NOTE — CONSULT NOTE ADULT - RESPIRATORY
Clinic note faxed to Dr. Rodolfo Barragan at [facility] to [facility]  10/11/2023      detailed exam

## 2024-07-18 NOTE — PRE-OP CHECKLIST - BLOOD AVAILABLE
"----- Message from Michelle Pope sent at 2024  9:13 AM CDT -----    ----- Message -----  From: Frandy Avendano MD  Sent: 2024   4:49 PM CDT  To: [facility]    Procedure: Anorectal manometry    Diagnosis: Constipation    Procedure Timin-12 weeks    #If within 4 weeks selected, please lily as high priority#    #If greater than 12 weeks, please select "5-12 weeks" and delay sending until 3 months prior to requested date#     Location: Any Site    Additional Scheduling Information: No scheduling concerns    Prep Specifications:N/A    Is the patient taking a GLP-1 Agonist:no    Have you attached a patient to this message: yes  " yes
